# Patient Record
Sex: MALE | Race: WHITE | Employment: OTHER | URBAN - METROPOLITAN AREA
[De-identification: names, ages, dates, MRNs, and addresses within clinical notes are randomized per-mention and may not be internally consistent; named-entity substitution may affect disease eponyms.]

---

## 2017-10-26 ENCOUNTER — APPOINTMENT (OUTPATIENT)
Dept: CT IMAGING | Age: 72
DRG: 682 | End: 2017-10-26
Attending: FAMILY MEDICINE
Payer: MEDICARE

## 2017-10-26 ENCOUNTER — HOSPITAL ENCOUNTER (INPATIENT)
Age: 72
LOS: 4 days | Discharge: NURSING, REHAB OR LONG-TERM CARE | DRG: 682 | End: 2017-10-31
Attending: EMERGENCY MEDICINE | Admitting: HOSPITALIST
Payer: MEDICARE

## 2017-10-26 ENCOUNTER — APPOINTMENT (OUTPATIENT)
Dept: GENERAL RADIOLOGY | Age: 72
DRG: 682 | End: 2017-10-26
Attending: FAMILY MEDICINE
Payer: MEDICARE

## 2017-10-26 DIAGNOSIS — R55 SYNCOPE, UNSPECIFIED SYNCOPE TYPE: Primary | ICD-10-CM

## 2017-10-26 DIAGNOSIS — A41.9 SEPSIS, DUE TO UNSPECIFIED ORGANISM: ICD-10-CM

## 2017-10-26 DIAGNOSIS — N17.9 ACUTE RENAL INJURY (HCC): ICD-10-CM

## 2017-10-26 LAB
ALBUMIN SERPL-MCNC: 3 G/DL (ref 3.4–5)
ALBUMIN/GLOB SERPL: 0.8 {RATIO} (ref 0.8–1.7)
ALP SERPL-CCNC: 52 U/L (ref 45–117)
ALT SERPL-CCNC: 22 U/L (ref 16–61)
ANION GAP SERPL CALC-SCNC: 15 MMOL/L (ref 3–18)
APTT PPP: 23.4 SEC (ref 23–36.4)
AST SERPL-CCNC: 18 U/L (ref 15–37)
ATRIAL RATE: 126 BPM
BASOPHILS # BLD: 0 K/UL (ref 0–0.06)
BASOPHILS NFR BLD: 0 % (ref 0–2)
BILIRUB SERPL-MCNC: 0.4 MG/DL (ref 0.2–1)
BNP SERPL-MCNC: 173 PG/ML (ref 0–900)
BUN SERPL-MCNC: 34 MG/DL (ref 7–18)
BUN/CREAT SERPL: 15 (ref 12–20)
CALCIUM SERPL-MCNC: 8.7 MG/DL (ref 8.5–10.1)
CALCULATED P AXIS, ECG09: 77 DEGREES
CALCULATED R AXIS, ECG10: 73 DEGREES
CALCULATED T AXIS, ECG11: -92 DEGREES
CHLORIDE SERPL-SCNC: 94 MMOL/L (ref 100–108)
CK MB CFR SERPL CALC: 1.4 % (ref 0–4)
CK MB SERPL-MCNC: 1.1 NG/ML (ref 5–25)
CK SERPL-CCNC: 78 U/L (ref 39–308)
CO2 SERPL-SCNC: 23 MMOL/L (ref 21–32)
CREAT SERPL-MCNC: 2.21 MG/DL (ref 0.6–1.3)
DIAGNOSIS, 93000: NORMAL
DIFFERENTIAL METHOD BLD: ABNORMAL
EOSINOPHIL # BLD: 0 K/UL (ref 0–0.4)
EOSINOPHIL NFR BLD: 0 % (ref 0–5)
ERYTHROCYTE [DISTWIDTH] IN BLOOD BY AUTOMATED COUNT: 13.1 % (ref 11.6–14.5)
GLOBULIN SER CALC-MCNC: 3.9 G/DL (ref 2–4)
GLUCOSE SERPL-MCNC: 194 MG/DL (ref 74–99)
HCT VFR BLD AUTO: 47.8 % (ref 36–48)
HGB BLD-MCNC: 16.8 G/DL (ref 13–16)
INR PPP: 1.1 (ref 0.8–1.2)
LYMPHOCYTES # BLD: 1 K/UL (ref 0.9–3.6)
LYMPHOCYTES NFR BLD: 7 % (ref 21–52)
MAGNESIUM SERPL-MCNC: 1.7 MG/DL (ref 1.6–2.6)
MCH RBC QN AUTO: 30.8 PG (ref 24–34)
MCHC RBC AUTO-ENTMCNC: 35.1 G/DL (ref 31–37)
MCV RBC AUTO: 87.7 FL (ref 74–97)
MONOCYTES # BLD: 1.3 K/UL (ref 0.05–1.2)
MONOCYTES NFR BLD: 10 % (ref 3–10)
NEUTS SEG # BLD: 11.2 K/UL (ref 1.8–8)
NEUTS SEG NFR BLD: 83 % (ref 40–73)
P-R INTERVAL, ECG05: 128 MS
PLATELET # BLD AUTO: 302 K/UL (ref 135–420)
PMV BLD AUTO: 10.8 FL (ref 9.2–11.8)
POTASSIUM SERPL-SCNC: 4.2 MMOL/L (ref 3.5–5.5)
PROT SERPL-MCNC: 6.9 G/DL (ref 6.4–8.2)
PROTHROMBIN TIME: 13.5 SEC (ref 11.5–15.2)
Q-T INTERVAL, ECG07: 300 MS
QRS DURATION, ECG06: 72 MS
QTC CALCULATION (BEZET), ECG08: 434 MS
RBC # BLD AUTO: 5.45 M/UL (ref 4.7–5.5)
SODIUM SERPL-SCNC: 132 MMOL/L (ref 136–145)
TROPONIN I SERPL-MCNC: <0.02 NG/ML (ref 0–0.06)
VENTRICULAR RATE, ECG03: 126 BPM
WBC # BLD AUTO: 13.5 K/UL (ref 4.6–13.2)

## 2017-10-26 PROCEDURE — 71010 XR CHEST PORT: CPT

## 2017-10-26 PROCEDURE — 99285 EMERGENCY DEPT VISIT HI MDM: CPT

## 2017-10-26 PROCEDURE — 85025 COMPLETE CBC W/AUTO DIFF WBC: CPT | Performed by: FAMILY MEDICINE

## 2017-10-26 PROCEDURE — 93005 ELECTROCARDIOGRAM TRACING: CPT

## 2017-10-26 PROCEDURE — 80053 COMPREHEN METABOLIC PANEL: CPT | Performed by: FAMILY MEDICINE

## 2017-10-26 PROCEDURE — 82550 ASSAY OF CK (CPK): CPT | Performed by: FAMILY MEDICINE

## 2017-10-26 PROCEDURE — 83880 ASSAY OF NATRIURETIC PEPTIDE: CPT | Performed by: FAMILY MEDICINE

## 2017-10-26 PROCEDURE — 85610 PROTHROMBIN TIME: CPT | Performed by: FAMILY MEDICINE

## 2017-10-26 PROCEDURE — 85730 THROMBOPLASTIN TIME PARTIAL: CPT | Performed by: FAMILY MEDICINE

## 2017-10-26 PROCEDURE — 83735 ASSAY OF MAGNESIUM: CPT | Performed by: FAMILY MEDICINE

## 2017-10-26 PROCEDURE — 85379 FIBRIN DEGRADATION QUANT: CPT | Performed by: EMERGENCY MEDICINE

## 2017-10-26 PROCEDURE — 70450 CT HEAD/BRAIN W/O DYE: CPT

## 2017-10-26 RX ORDER — ONDANSETRON 2 MG/ML
4 INJECTION INTRAMUSCULAR; INTRAVENOUS
Status: COMPLETED | OUTPATIENT
Start: 2017-10-26 | End: 2017-10-27

## 2017-10-26 NOTE — Clinical Note
Status[de-identified] Inpatient [101] Type of Bed: Telemetry [19] Inpatient Hospitalization Certified Necessary for the Following Reasons: 3. Patient receiving treatment that can only be provided in an inpatient setting (further clarification in H&P documentation) Admitting Diagnosis: Syncope [[id number]] Admitting Diagnosis: Sepsis ([facility] [[id number]] Admitting Physician: Jacqueline Zheng [[id number]] Attending Physician: Jacqueline Zheng [[id number]] Estimated Length of Stay: 3-4 Midnights Discharge Plan[de-identified] Home with Office Follow-up

## 2017-10-27 ENCOUNTER — APPOINTMENT (OUTPATIENT)
Dept: NUCLEAR MEDICINE | Age: 72
DRG: 682 | End: 2017-10-27
Attending: EMERGENCY MEDICINE
Payer: MEDICARE

## 2017-10-27 ENCOUNTER — APPOINTMENT (OUTPATIENT)
Dept: ULTRASOUND IMAGING | Age: 72
DRG: 682 | End: 2017-10-27
Attending: HOSPITALIST
Payer: MEDICARE

## 2017-10-27 PROBLEM — R55 SYNCOPE: Status: ACTIVE | Noted: 2017-10-27

## 2017-10-27 PROBLEM — N17.9 ACUTE RENAL INJURY (HCC): Status: ACTIVE | Noted: 2017-10-27

## 2017-10-27 PROBLEM — E86.0 DEHYDRATION: Status: ACTIVE | Noted: 2017-10-27

## 2017-10-27 PROBLEM — A41.9 SEPSIS (HCC): Status: ACTIVE | Noted: 2017-10-27

## 2017-10-27 LAB
APPEARANCE UR: CLEAR
BACTERIA URNS QL MICRO: NEGATIVE /HPF
BILIRUB UR QL: NEGATIVE
COLOR UR: ABNORMAL
D DIMER PPP FEU-MCNC: 1.31 UG/ML(FEU)
EPITH CASTS URNS QL MICRO: NEGATIVE /LPF (ref 0–5)
GLUCOSE UR STRIP.AUTO-MCNC: NEGATIVE MG/DL
HGB UR QL STRIP: NEGATIVE
KETONES UR QL STRIP.AUTO: ABNORMAL MG/DL
LACTATE SERPL-SCNC: 2.1 MMOL/L (ref 0.4–2)
LACTATE SERPL-SCNC: 2.3 MMOL/L (ref 0.4–2)
LACTATE SERPL-SCNC: 2.6 MMOL/L (ref 0.4–2)
LACTATE SERPL-SCNC: 3.9 MMOL/L (ref 0.4–2)
LEUKOCYTE ESTERASE UR QL STRIP.AUTO: NEGATIVE
MUCOUS THREADS URNS QL MICRO: NEGATIVE /LPF
NITRITE UR QL STRIP.AUTO: NEGATIVE
PH UR STRIP: 5 [PH] (ref 5–8)
PROT UR STRIP-MCNC: ABNORMAL MG/DL
RBC #/AREA URNS HPF: NEGATIVE /HPF (ref 0–5)
SP GR UR REFRACTOMETRY: 1.02 (ref 1–1.03)
UROBILINOGEN UR QL STRIP.AUTO: 0.2 EU/DL (ref 0.2–1)
WBC URNS QL MICRO: NEGATIVE /HPF (ref 0–5)

## 2017-10-27 PROCEDURE — A9540 TC99M MAA: HCPCS

## 2017-10-27 PROCEDURE — 36415 COLL VENOUS BLD VENIPUNCTURE: CPT | Performed by: HOSPITALIST

## 2017-10-27 PROCEDURE — 96361 HYDRATE IV INFUSION ADD-ON: CPT

## 2017-10-27 PROCEDURE — 87040 BLOOD CULTURE FOR BACTERIA: CPT | Performed by: EMERGENCY MEDICINE

## 2017-10-27 PROCEDURE — 65660000000 HC RM CCU STEPDOWN

## 2017-10-27 PROCEDURE — 74011000258 HC RX REV CODE- 258: Performed by: EMERGENCY MEDICINE

## 2017-10-27 PROCEDURE — 96365 THER/PROPH/DIAG IV INF INIT: CPT

## 2017-10-27 PROCEDURE — 74011250637 HC RX REV CODE- 250/637: Performed by: HOSPITALIST

## 2017-10-27 PROCEDURE — 74011250636 HC RX REV CODE- 250/636: Performed by: EMERGENCY MEDICINE

## 2017-10-27 PROCEDURE — 83605 ASSAY OF LACTIC ACID: CPT | Performed by: EMERGENCY MEDICINE

## 2017-10-27 PROCEDURE — 74011000250 HC RX REV CODE- 250: Performed by: HOSPITALIST

## 2017-10-27 PROCEDURE — 97162 PT EVAL MOD COMPLEX 30 MIN: CPT

## 2017-10-27 PROCEDURE — 76770 US EXAM ABDO BACK WALL COMP: CPT

## 2017-10-27 PROCEDURE — 94640 AIRWAY INHALATION TREATMENT: CPT

## 2017-10-27 PROCEDURE — 83605 ASSAY OF LACTIC ACID: CPT | Performed by: HOSPITALIST

## 2017-10-27 PROCEDURE — 97165 OT EVAL LOW COMPLEX 30 MIN: CPT

## 2017-10-27 PROCEDURE — 74011250636 HC RX REV CODE- 250/636: Performed by: HOSPITALIST

## 2017-10-27 PROCEDURE — 97530 THERAPEUTIC ACTIVITIES: CPT

## 2017-10-27 PROCEDURE — 97116 GAIT TRAINING THERAPY: CPT

## 2017-10-27 PROCEDURE — 94760 N-INVAS EAR/PLS OXIMETRY 1: CPT

## 2017-10-27 PROCEDURE — 96375 TX/PRO/DX INJ NEW DRUG ADDON: CPT

## 2017-10-27 PROCEDURE — 77030019952 HC CANSTR VAC ASST KCON -B

## 2017-10-27 PROCEDURE — 81001 URINALYSIS AUTO W/SCOPE: CPT | Performed by: FAMILY MEDICINE

## 2017-10-27 RX ORDER — GUAIFENESIN 100 MG/5ML
81 LIQUID (ML) ORAL DAILY
Status: DISCONTINUED | OUTPATIENT
Start: 2017-10-28 | End: 2017-10-31 | Stop reason: HOSPADM

## 2017-10-27 RX ORDER — LORAZEPAM 2 MG/ML
0.5 INJECTION INTRAMUSCULAR
Status: COMPLETED | OUTPATIENT
Start: 2017-10-27 | End: 2017-10-27

## 2017-10-27 RX ORDER — PRAVASTATIN SODIUM 20 MG/1
40 TABLET ORAL
Status: DISCONTINUED | OUTPATIENT
Start: 2017-10-27 | End: 2017-10-31 | Stop reason: HOSPADM

## 2017-10-27 RX ORDER — SODIUM CHLORIDE 9 MG/ML
50 INJECTION, SOLUTION INTRAVENOUS CONTINUOUS
Status: DISCONTINUED | OUTPATIENT
Start: 2017-10-27 | End: 2017-10-31 | Stop reason: HOSPADM

## 2017-10-27 RX ORDER — IPRATROPIUM BROMIDE AND ALBUTEROL SULFATE 2.5; .5 MG/3ML; MG/3ML
3 SOLUTION RESPIRATORY (INHALATION)
Status: DISCONTINUED | OUTPATIENT
Start: 2017-10-27 | End: 2017-10-28

## 2017-10-27 RX ORDER — SERTRALINE HYDROCHLORIDE 50 MG/1
100 TABLET, FILM COATED ORAL DAILY
Status: DISCONTINUED | OUTPATIENT
Start: 2017-10-28 | End: 2017-10-31 | Stop reason: HOSPADM

## 2017-10-27 RX ORDER — OMEPRAZOLE 20 MG/1
20 CAPSULE, DELAYED RELEASE ORAL DAILY
Status: DISCONTINUED | OUTPATIENT
Start: 2017-10-28 | End: 2017-10-31 | Stop reason: HOSPADM

## 2017-10-27 RX ORDER — ENOXAPARIN SODIUM 100 MG/ML
1 INJECTION SUBCUTANEOUS
Status: COMPLETED | OUTPATIENT
Start: 2017-10-27 | End: 2017-10-27

## 2017-10-27 RX ORDER — HEPARIN SODIUM 5000 [USP'U]/ML
5000 INJECTION, SOLUTION INTRAVENOUS; SUBCUTANEOUS EVERY 8 HOURS
Status: DISCONTINUED | OUTPATIENT
Start: 2017-10-28 | End: 2017-10-31 | Stop reason: HOSPADM

## 2017-10-27 RX ADMIN — SODIUM CHLORIDE 1000 ML: 900 INJECTION, SOLUTION INTRAVENOUS at 00:40

## 2017-10-27 RX ADMIN — LORAZEPAM 0.5 MG: 2 INJECTION INTRAMUSCULAR; INTRAVENOUS at 00:40

## 2017-10-27 RX ADMIN — SODIUM CHLORIDE 125 ML/HR: 900 INJECTION, SOLUTION INTRAVENOUS at 18:35

## 2017-10-27 RX ADMIN — PRAVASTATIN SODIUM 40 MG: 20 TABLET ORAL at 21:17

## 2017-10-27 RX ADMIN — CEFTRIAXONE 1 G: 1 INJECTION, POWDER, FOR SOLUTION INTRAMUSCULAR; INTRAVENOUS at 00:40

## 2017-10-27 RX ADMIN — IPRATROPIUM BROMIDE AND ALBUTEROL SULFATE 3 ML: .5; 3 SOLUTION RESPIRATORY (INHALATION) at 20:18

## 2017-10-27 RX ADMIN — ONDANSETRON 4 MG: 2 INJECTION INTRAMUSCULAR; INTRAVENOUS at 00:40

## 2017-10-27 RX ADMIN — IPRATROPIUM BROMIDE AND ALBUTEROL SULFATE 3 ML: .5; 3 SOLUTION RESPIRATORY (INHALATION) at 23:43

## 2017-10-27 RX ADMIN — IPRATROPIUM BROMIDE AND ALBUTEROL SULFATE 3 ML: .5; 3 SOLUTION RESPIRATORY (INHALATION) at 15:14

## 2017-10-27 RX ADMIN — ENOXAPARIN SODIUM 80 MG: 80 INJECTION SUBCUTANEOUS at 03:02

## 2017-10-27 RX ADMIN — SODIUM CHLORIDE 1000 ML: 900 INJECTION, SOLUTION INTRAVENOUS at 01:00

## 2017-10-27 NOTE — H&P
History & Physical    Patient: Leona Thao MRN: [id number]  CSN: [id number]    YOB: 1945  Age: 81 y.o. Sex: male      DOA: 10/26/2017  Primary Care Provider:  Evert Aragon MD      Assessment/Plan     Patient Active Problem List   Diagnosis Code    Syncope R55    Acute renal injury ([facility] N17.9    Hypertension I10    Chronic obstructive pulmonary disease ([facility] J44.9    Dehydration E86.0       Admit to telemetry    Syncopal episode - likely secondary to orthostatic hypotension from dehydration. Monitor in telemetry. YA - pre renal, intravascular volume depletion. As evidence by hemoconcentration. Start no IVF,   Follow renal function  Will evaluate ARF by getting following labs   -Urine indices including urine analysis, urine sodium,urine creatinine,urine osmolality, renal usg. Unlikely sepsis - his lactic acid elevation is secondary to hypo perfusion. No evidence or source of infection. COPD - no active wheezing, duo nebs as needed. HTN - hold lisinopril and hctz. Elevated D-dimer - VQ scan ordered by ER, he was given therapeutic dose of lovenox in ER. His oxygen saturations % on room air. DVT prophylaxis            Estimated length of stay : 1-2    CC: syncope       HPI:     Leona Thao is a 81 y.o. male who has past history of COPD, HTN, stroke presents to ER with concerns of syncopal episode. Patient is poor historian. He reports that last night he got out of shower and he passed out. He was not able get up. EMS was called and patient brought to ER. Patient reports that he has been feeling weak, fatigue and not being well for about a month. Over the past few days he has been feeling shaking/chills. He denies any chest pain, SOB, N/V. He lives alone. Denies smoking or alcohol use. In ER his temp at 96.1, his BUN/Cr 34/2.21, cardiac enzymes negative.  D-Dimer at 1.31, lactic acid at 3. UA with no evidence of UTI, CXR with no acute findings. Past Medical History:   Diagnosis Date    Chronic obstructive pulmonary disease ([facility]     Hypertension     Stroke [facility])        Past Surgical History:   Procedure Laterality Date    HX ORTHOPAEDIC      left leg surgery       History reviewed. No pertinent family history. Social History     Social History    Marital status:      Spouse name: N/A    Number of children: N/A    Years of education: N/A     Social History Main Topics    Smoking status: Former Smoker    Smokeless tobacco: Never Used    Alcohol use None    Drug use: None    Sexual activity: Not Asked     Other Topics Concern    None     Social History Narrative       Prior to Admission medications    Medication Sig Start Date End Date Taking? Authorizing Provider   aspirin 81 mg chewable tablet Take 81 mg by mouth daily. Yes Malinda Corbin MD   lisinopril-hydrochlorothiazide (PRINZIDE, ZESTORETIC) 10-12.5 mg per tablet Take  by mouth daily. Yes Malinda Corbin MD   pravastatin (PRAVACHOL) 40 mg tablet Take 40 mg by mouth nightly. Yes Malinda Corbin MD   albuterol (PROVENTIL HFA, VENTOLIN HFA, PROAIR HFA) 90 mcg/actuation inhaler Take  by inhalation. Yes Malinda Corbin MD   omeprazole (PRILOSEC) 20 mg capsule Take 1 capsule by mouth daily. 10/5/14  Yes Caleen Meigs, MD   ondansetron (ZOFRAN ODT) 8 mg disintegrating tablet Take 1 tablet by mouth every eight (8) hours as needed for Nausea. 10/5/14  Yes Caleen Meigs, MD   sertraline (ZOLOFT) 100 mg tablet Take 100 mg by mouth daily. Malinda Corbin MD   tiotropium (SPIRIVA WITH HANDIHALER) 18 mcg inhalation capsule Take 1 capsule by inhalation daily. Malinda Corbin MD   hydrocodone-acetaminophen (NORCO) 7.5-325 mg per tablet Take  by mouth. Malinda Corbin MD       No Known Allergies    Review of Systems  Gen: see above   Heent: No headache, rhinorrhea, epistaxis, ear pain, hearing loss, sinus pain, neck pain/stiffness, sore throat.    Heart: No chest pain, palpitations, MCMILLAN, pnd, or orthopnea. Resp: No cough, hemoptysis, wheezing and shortness of breath. GI: No nausea, vomiting, diarrhea, constipation, melena or hematochezia. : No urinary obstruction, dysuria or hematuria. Derm: No rash, new skin lesion or pruritis. Musc/skeletal: no bone or joint complains. Vasc: No edema, cyanosis or claudication. Endo: No, no polyuria,polydipsia or polyphagia. Neuro: No unilateral weakness, numbness, tingling. No seizures. Heme: No easy bruising or bleeding. Physical Exam:     Physical Exam:  Visit Vitals    /66 (BP 1 Location: Left arm, BP Patient Position: At rest)    Pulse 98    Temp 98 °F (36.7 °C)    Resp 14    Ht 5' 7\" (1.702 m)    Wt 84.3 kg (185 lb 12.8 oz)    SpO2 99%    BMI 29.1 kg/m2      O2 Device: Room air    Temp (24hrs), Av.4 °F (36.3 °C), Min:96.1 °F (35.6 °C), Max:98.2 °F (36.8 °C)             General:  Awake, cooperative, no distress. Head:  Normocephalic, without obvious abnormality, atraumatic. Eyes:  Conjunctivae/corneas clear, sclera anicteric, PERRL, EOMs intact. Nose: Nares normal. No drainage or sinus tenderness. Throat: Lips, mucosa, and tongue normal.    Neck: Supple, symmetrical, trachea midline, no adenopathy. Lungs:   Clear to auscultation bilaterally. Heart:  Regular rate and rhythm, S1, S2 normal, no murmur, click, rub or gallop. Abdomen: Soft, non-tender. Bowel sounds normal. No masses,  No organomegaly. Extremities: Extremities normal, atraumatic, no cyanosis or edema. Capillary refill normal.   Pulses: 2+ and symmetric all extremities. Skin: Skin color pink, turgor normal. No rashes or lesions   Neurologic: CNII-XII intact. No focal motor or sensory deficit.        Labs Reviewed:    CMP:   Lab Results   Component Value Date/Time     (L) 10/26/2017 11:07 PM    K 4.2 10/26/2017 11:07 PM    CL 94 (L) 10/26/2017 11:07 PM    CO2 23 10/26/2017 11:07 PM    AGAP 15 10/26/2017 11:07 PM    GLU 194 (H) 10/26/2017 11:07 PM    BUN 34 (H) 10/26/2017 11:07 PM    CREA 2.21 (H) 10/26/2017 11:07 PM    GFRAA 36 (L) 10/26/2017 11:07 PM    GFRNA 30 (L) 10/26/2017 11:07 PM    CA 8.7 10/26/2017 11:07 PM    MG 1.7 10/26/2017 11:07 PM    ALB 3.0 (L) 10/26/2017 11:07 PM    TP 6.9 10/26/2017 11:07 PM    GLOB 3.9 10/26/2017 11:07 PM    AGRAT 0.8 10/26/2017 11:07 PM    SGOT 18 10/26/2017 11:07 PM    ALT 22 10/26/2017 11:07 PM     CBC:   Lab Results   Component Value Date/Time    WBC 13.5 (H) 10/26/2017 11:07 PM    HGB 16.8 (H) 10/26/2017 11:07 PM    HCT 47.8 10/26/2017 11:07 PM     10/26/2017 11:07 PM     All Cardiac Markers in the last 24 hours:   Lab Results   Component Value Date/Time    CPK 78 10/26/2017 11:07 PM    CKMB 1.1 10/26/2017 11:07 PM    CKND1 1.4 10/26/2017 11:07 PM    TROIQ <0.02 10/26/2017 11:07 PM         Procedures/imaging: see electronic medical records for all procedures/Xrays and details which were not copied into this note but were reviewed prior to creation of Plan        CC: Evert Aragon MD

## 2017-10-27 NOTE — PROGRESS NOTES
Readmission Risk Assessment: Low Risk and MSSP/[facility] patients    RRAT Score: 1 - 12    Initial Assessment: presents to the emergency department via EMS C/O syncopal episode lasting 15 minutes onset just PTA patient admitted to telemetry unit for syncope and sepsis    Emergency Contact: see face sheet    Pertinent Medical Hx: COPD, HTN, stroke    PCP/Specialists: Cat Palacios    [facility] Services:     DME:     Low Risk Care Transition Plan:  1. Evaluate for [facility] facility or [address] coordination of resources  2. Involve patient/caregiver in assessment, planning, education and implement of intervention. 3. CM daily patient care huddles/interdisciplinary rounds. 4. PCP/Specialist appointment within 7 - 10 days made prior to discharge. 5. Facilitate transportation and logistics for follow-up appointments. 6. Handoff to [address] Nurse Navigator or PCP practice  Chart Reviewed. Noted patient admitted to the hospital for further medical management. Care Management to follow up with patient and/or family for transition of care needs prn. Met with patient at bedside. He informed cm that he would need rehab when discharged. [facility] provided and he would like to submit to [address] [place] [address] [facility], and [facility]. CMS will submit referrals.  Patient will need 3 midnights to submit for rehab

## 2017-10-27 NOTE — PROGRESS NOTES
attempted visit with patient but he was out of room and  unavailable.  provided brochure and [address] as well and [employer] booklet for patient. Chaplains remain available to provide pastoral support to patient and family as needed and requested. Rev.  Nuvia Wynn  [phone]

## 2017-10-27 NOTE — PROGRESS NOTES
Problem: Self Care Deficits Care Plan (Adult)  Goal: *Acute Goals and Plan of Care (Insert Text)  Occupational Therapy Goals  Initiated 10/27/2017 within 7 day(s). 1.  Patient will perform lower body dressing with  modified independence while sitting on EOB  2. Patient will perform grooming with modified independence while standing at sink. 3.  Patient will perform toilet transfers with  modified independence. 4.  Patient will perform all aspects of toileting with  modified independence. 5.  Patient will participate in upper extremity therapeutic exercise/activities with  modified independence for 15 minutes. 6.  Patient will utilize energy conservation techniques during functional activities with verbal cues. Occupational Therapy EVALUATION    Patient: Kamla Avila (75 y.o. male)  Date: 10/27/2017  Primary Diagnosis: Syncope  Sepsis ([facility]  Syncope  Sepsis ([facility]  Acute renal injury [facility])  Precautions:  Fall    ASSESSMENT :  Based on the objective data described below, the patient presents with decreased ability to perform functional mobility, bed mobility, LE dressing and functional transfers. Pt limited by pain, decreased strength/endurance which is affecting  activity tolerance to complete ADLs/IADLs and functional mobility/transfers. Pt was seen with PT to maximize safety and performance in evaluation. Pt did complete bed mobility with SBA. Pt did report of light-headedness upon sitting on EOB. Pt performed sit/stand transfer with CGA and RW. Pt performed in-room and hallway moblity with RW but reported of 10/10 fatigue at end after sitting on EOB. Pt's HR was 141 upon sitting EOB and went down to 129 and remained. Once transferred back to supine, pt's HR was 79. Pt was left with all needs met. Pt would benefit from skilled OT services to work toward PLOF in being as independent as possible. Pt does live alone with no support system.  Pt is highly motivated and agreeable to participating in OT.    Patient will benefit from skilled intervention to address the above impairments. Patients rehabilitation potential is considered to be Excellent  Factors which may influence rehabilitation potential include:   [x]             None noted  []             Mental ability/status  []             Medical condition  []             Home/family situation and support systems  []             Safety awareness  []             Pain tolerance/management  []             Other:      PLAN :  Recommendations and Planned Interventions:  [x]               Self Care Training                  [x]        Therapeutic Activities  [x]               Functional Mobility Training    []        Cognitive Retraining  []               Therapeutic Exercises           []        Endurance Activities  []               Balance Training                   []        Neuromuscular Re-Education  []               Visual/Perceptual Training     [x]   Home Safety Training  [x]               Patient Education                 [x]        Family Training/Education  []               Other (comment):    Frequency/Duration: Patient will be followed by occupational therapy 3-5x/wk for 1week   to address goals. Discharge Recommendations: Skilled Nursing Facility  Further Equipment Recommendations for Discharge: TBD     SUBJECTIVE:   Patient stated I want to eat. . But I'm NPO.    OBJECTIVE DATA SUMMARY:     Past Medical History:   Diagnosis Date    Chronic obstructive pulmonary disease ([facility]     Hypertension     [facility]      Past Surgical History:   Procedure Laterality Date    HX ORTHOPAEDIC      left leg surgery     Barriers to Learning/Limitations: None  Compensate with: visual, verbal, tactile, kinesthetic cues/model  Prior Level of Function/Home Situation: Ind with ADLs/IADLs.  Driving prior  Home Situation  Home Environment: Private residence  # Steps to Enter: 0  Wheelchair Ramp: Yes  One/Two Story Residence: One story  Living Alone: Yes  Support Systems: Friends \ neighbors  Patient Expects to be Discharged to[de-identified] Private residence  Current DME Used/Available at Home: Hospital bed, Walker, rolling, Adaptive bathing aides, Shower chair, Verlyn [place], straight, Wheelchair (amb with [facility]; reports no falls)  Tub or Shower Type: Shower  Cognitive/Behavioral Status:  Neurologic State: Alert; Appropriate for age  Orientation Level: Appropriate for age;Oriented X4  Cognition: Appropriate decision making; Appropriate for age attention/concentration; Appropriate safety awareness; Follows commands  Safety/Judgement: Awareness of environment  Skin: BUE skin intact  Edema: none noted  Vision/Perceptual:    Corrective Lenses: Reading glasses  Coordination:  Coordination: Generally decreased, functional  Fine Motor Skills-Upper: Left Intact; Right Intact    Gross Motor Skills-Upper: Left Intact; Right Intact  Balance:  Sitting: Intact  Standing: Intact; With support  Strength:(B) UE   Strength: Generally decreased, functional   Tone & Sensation:(B) UE   Sensation: Intact  Range of Motion:(B) UE  AROM: Within functional limits   Functional Mobility and Transfers for ADLs:  Bed Mobility:  Rolling: Setup  Supine to Sit: Stand-by asssistance  Sit to Supine: Stand-by asssistance   Transfers:  Sit to Stand: Contact guard assistance   ADL Assessment/Intervention:   Upper Body Dressing: Minimum assistance (donning of gown like a jacket)  Lower Body Dressing: Setup (pt donned neda socks while seated on EOB)    Cognitive Retraining  Safety/Judgement: Awareness of environment   Pain:  Pain Scale 1: Numeric (0 - 10)  Pain Intensity 1: 0  Pain Location 1: Back  Activity Tolerance:   Pt tolerated tx/eval well with no signs of fatigue or increase in pain after treatment. Please refer to the flowsheet for vital signs taken during this treatment.   After treatment:   [] Patient left in no apparent distress sitting up in chair  [] Patient left sitting on EOB  [x] Patient left in no apparent distress in bed  [] Patient declined to be OOB at this time due to   [x] Call bell left within reach  [x] Nursing notified()  [] Caregiver present  [] Bed alarm activated  [] CPM placed on  knee  COMMUNICATION/EDUCATION:   [] Home safety education was provided and the patient/caregiver indicated understanding. [x] Patient/family have participated as able in goal setting and plan of care. [x] Patient/family agree to work toward stated goals and plan of care. [] Patient understands intent and goals of therapy, but is neutral about his/her participation. [] Patient is unable to participate in goal setting and plan of care.     Thank you for this referral.  Kaelyn Decker, OT  Time Calculation: 26 mins

## 2017-10-27 NOTE — PROGRESS NOTES
Problem: Mobility Impaired (Adult and Pediatric)  Goal: *Acute Goals and Plan of Care (Insert Text)  Physical Therapy Goals LT/ST  Initiated 10/27/2017 and to be accomplished within 5-7 day(s)  1. Patient will move from supine <> sit with S/mod I in prep for out of bed activity and change of position. 2.  Patient will perform sit<> stand with S/mod I with LRAD in prep for transfers/ambulation. 3.  Patient will transfer from bed <> chair with S with LRAD for time up in chair for completion of ADL activity. 4.  Patient will ambulate 150 feet with S/mod I with LRAD for improved functional mobility/safe discharge. 5.  Patient will ascend/descend 3-5 stairs with handrail(s) with minimal assistance/contact guard assist for home re-entry as needed. Outcome: Progressing Towards Goal  physical Therapy EVALUATION    Patient: Dianne Bar (85 y.o. male)  Date: 10/27/2017  Primary Diagnosis: Syncope  Sepsis ([facility]  Syncope  Sepsis ([facility]  Acute renal injury ([facility]  Precautions:Fall    ASSESSMENT :  Based on the objective data described below, the patient presents with decreased independence in functional mobility with regard to bed mobility, transfers, gait, balance and activity tolerance. Patient reports pain 3/10 back (likely due to bed positioning). Pt able to participate in GT/RW/75ft with CGA using slow davon, vc for posture correction. HR increased to 140's upon return to room, decreased to 120's with resting 2 min and to 70's upon return to supine. Pt lives alone, fell in shower which led to current admission. Pt reports not feeling well for 1 month since receiving flu booster shot. May benefit from continued PT in rehab/SNF prior to return home to independent living in order to reach maximal level of independence/safety with functional mobility.      Pt Education: pt educated in safety/technique during functional mobility tasks     Patient will benefit from skilled intervention to address the above impairments. Patients rehabilitation potential is considered to be Good  Factors which may influence rehabilitation potential include:   []         None noted  []         Mental ability/status  [x]         Medical condition  []         Home/family situation and support systems  []         Safety awareness  []         Pain tolerance/management  []         Other:      PLAN :  Recommendations and Planned Interventions:  [x]           Bed Mobility Training             []    Neuromuscular Re-Education  [x]           Transfer Training                   []    Orthotic/Prosthetic Training  [x]           Gait Training                          []    Modalities  [x]           Therapeutic Exercises          []    Edema Management/Control  [x]           Therapeutic Activities            [x]    Patient and Family Training/Education  []           Other (comment):    Frequency/Duration: Patient will be followed by physical therapy 1-2 times per day to address goals. Discharge Recommendations: [facility]  Further Equipment Recommendations for Discharge: rolling walker     SUBJECTIVE:   Patient stated My legs feel weak.     OBJECTIVE DATA SUMMARY:     Past Medical History:   Diagnosis Date    [facility] pulmonary disease ([facility]     Hypertension     Stroke [facility])      Past Surgical History:   Procedure Laterality Date    HX ORTHOPAEDIC      left leg surgery     Barriers to Learning/Limitations: None  Compensate with: N/A  Prior Level of Function/Home Situation: amb with SPC PTA independently  Home Situation  Home Environment: Private residence  # Steps to Enter: 0  Wheelchair Ramp: Yes  One/Two Story Residence: One story  Living Alone: Yes  Support Systems: Friends \ neighbors  Patient Expects to be Discharged to[de-identified] Private residence  Current DME Used/Available at Home: Hospital bed, Walker, rolling, Adaptive bathing aides, Shower chair,  [place], straight, Wheelchair (amb with [facility]; reports no falls)  Tub or Shower Type: Shower  Critical Behavior:  Neurologic State: Alert; Appropriate for age  Orientation Level: Appropriate for age;Oriented X4  Cognition: Appropriate decision making; Appropriate for age attention/concentration; Appropriate safety awareness; Follows commands  Safety/Judgement: Awareness of environment  Psychosocial  Patient Behaviors: Calm; Cooperative; Anxious (anxious toward end of session)  Strength:    Strength: Generally decreased, functional  Tone & Sensation:   Sensation: Intact  Range Of Motion:  AROM: Within functional limits  Functional Mobility:  Bed Mobility:  Rolling: Setup  Supine to Sit: Stand-by asssistance  Sit to Supine: Stand-by asssistance  Transfers:  Sit to Stand: Contact guard assistance  Stand to Sit: Contact guard assistance  Balance:   Sitting: Intact  Standing: Intact; With support  Ambulation/Gait Training:  Distance (ft): 75 Feet (ft)  Assistive Device: Gait belt;Walker, rolling  Ambulation - Level of Assistance: Contact guard assistance  Gait Description (WDL): Exceptions to WDL  Gait Abnormalities: Decreased step clearance; Other (forward trunk slightly )  Speed/Irri: Pace decreased (<100 feet/min)  Step Length: Left shortened;Right shortened  Interventions: Safety awareness training;Verbal cues  Pain:  Pain Scale 1: Numeric (0 - 10)  Pain Intensity 1: 3  Pain Location 1: Back  Activity Tolerance:   fair  Please refer to the flowsheet for vital signs taken during this treatment. After treatment:   []         Patient left in no apparent distress sitting up in chair  [x]         Patient left in no apparent distress in bed  [x]         Call bell left within reach  [x]         Nursing notified  []         Caregiver present  []         Bed alarm activated    COMMUNICATION/EDUCATION:   [x]         Fall prevention education was provided and the patient/caregiver indicated understanding. [x]         Patient/family have participated as able in goal setting and plan of care.   [x] Patient/family agree to work toward stated goals and plan of care. []         Patient understands intent and goals of therapy, but is neutral about his/her participation. []         Patient is unable to participate in goal setting and plan of care. Eval Complexity: History: HIGH Complexity :3+ comorbidities / personal factors will impact the outcome/ POC Exam:LOW Complexity : 1-2 Standardized tests and measures addressing body structure, function, activity limitation and / or participation in recreation  Presentation: LOW Complexity : Stable, uncomplicated  Clinical Decision Making:Low Complexity amb >30ft with CGA/RW Overall Complexity:LOW     G-Codes (GP)  Mobility  [id number] Current  CI= 1-19%   Goal  CI= 1-19%  The severity rating is based on the functional mobility assessment.     Thank you for this referral.  Laurent Antonio, PT   Time Calculation: 26 mins

## 2017-10-27 NOTE — ED PROVIDER NOTES
[address]  EMERGENCY DEPARTMENT HISTORY AND PHYSICAL EXAM       Date: 10/26/2017   Patient Name: Belkis Cerna   YOB: 1945  Medical Record Number: [id number]    History of Presenting Illness       Chief Complaint   Patient presents with    Syncope        History Provided By:  Patient, family, and POA    Additional History: 11:08 PM   Belkis Cerna is a 81 y.o. male with pmhx of stroke, COPD, and HTN, who presents to the emergency department via EMS C/O syncopal episode lasting 15 minutes onset just PTA. Family reports pt has been \"feeling unwell\" onset 3 days ago with associated symptoms of increased fatigue, generalized weakness, shakiness, intermittent fever/chills (96.1 F in ED), and N/V/D. Per pt's POA, pt had just walked out of the shower when syncopal episode occured. Pt was not able to pick himself off the ground and was found by family. EMS reports on arrival pt was cold to touch and shaking with low O2 sats. Pt denies CP, abdominal pain, and any other Sx or complaints. Primary Care Provider: Glory Allen MD   Specialist:      Past History     Past Medical History:   Past Medical History:   Diagnosis Date    Chronic obstructive pulmonary disease ([facility] [address]     Hypertension     Stroke [facility])         Past Surgical History:   Past Surgical History:   Procedure Laterality Date    HX ORTHOPAEDIC      left leg surgery        Family History:   History reviewed. No pertinent family history. Social History:   Social History   Substance Use Topics    Smoking status: Former Smoker    Smokeless tobacco: Never Used    Alcohol use None        Allergies:   No Known Allergies     Review of Systems   Review of Systems   Constitutional: Positive for chills, fatigue and fever. Cardiovascular: Negative for chest pain. Gastrointestinal: Positive for diarrhea, nausea and vomiting. Negative for abdominal pain.    Musculoskeletal: Positive for back pain and myalgias. Neurological: Positive for tremors, syncope and weakness. All other systems reviewed and are negative. Physical Exam  Vitals:    10/26/17 2233 10/26/17 2327   BP: 128/78    Pulse: (!) 132    Resp: 22    Temp: 96.1 °F (35.6 °C) 98.2 °F (36.8 °C)   SpO2: 100%    Weight: 84.3 kg (185 lb 12.8 oz)    Height: 5' 7\" (1.702 m)        Physical Exam   Constitutional: He is oriented to person, place, and time. He appears well-developed and well-nourished. No distress. Elderly male. NAD. Under the bear hugger. HENT:   Head: Normocephalic and atraumatic. No signs of trauma to scalp. Slight right mouth droop probably chronic for pt. Eyes: Pupils are equal, round, and reactive to light. Neck: Neck supple. Cardiovascular: Regular rhythm, S1 normal, S2 normal and normal heart sounds. Tachycardia present. Pulmonary/Chest: Breath sounds normal. No respiratory distress. He has no decreased breath sounds. He has no wheezes. He has no rhonchi. He has no rales. He exhibits no tenderness. Abdominal: Soft. He exhibits no distension and no mass. There is no tenderness. There is no guarding. Musculoskeletal: Normal range of motion. He exhibits no edema. Lumbar back: He exhibits tenderness (chronic back pain). Right upper arm: He exhibits no swelling and no edema. Left upper arm: He exhibits no swelling and no edema. Right forearm: He exhibits no swelling and no edema. Left forearm: He exhibits no swelling and no edema. Right lower leg: He exhibits no swelling and no edema. Left lower leg: He exhibits no swelling and no edema. Straight leg raise normal.   Neurological: He is alert and oriented to person, place, and time. No cranial nerve deficit. Skin: No rash noted. Psychiatric: He has a normal mood and affect. His behavior is normal. Thought content normal.   Nursing note and vitals reviewed.         Diagnostic Study Results     Labs -      Recent Results (from the past 12 hour(s))   CBC WITH AUTOMATED DIFF    Collection Time: 10/26/17 11:07 PM   Result Value Ref Range    WBC 13.5 (H) 4.6 - 13.2 K/uL    RBC 5.45 4.70 - 5.50 M/uL    HGB 16.8 (H) 13.0 - 16.0 g/dL    HCT 47.8 36.0 - 48.0 %    MCV 87.7 74.0 - 97.0 FL    MCH 30.8 24.0 - 34.0 PG    MCHC 35.1 31.0 - 37.0 g/dL    RDW 13.1 11.6 - 14.5 %    PLATELET 092 139 - 090 K/uL    MPV 10.8 9.2 - 11.8 FL    NEUTROPHILS 83 (H) 40 - 73 %    LYMPHOCYTES 7 (L) 21 - 52 %    MONOCYTES 10 3 - 10 %    EOSINOPHILS 0 0 - 5 %    BASOPHILS 0 0 - 2 %    ABS. NEUTROPHILS 11.2 (H) 1.8 - 8.0 K/UL    ABS. LYMPHOCYTES 1.0 0.9 - 3.6 K/UL    ABS. MONOCYTES 1.3 (H) 0.05 - 1.2 K/UL    ABS. EOSINOPHILS 0.0 0.0 - 0.4 K/UL    ABS. BASOPHILS 0.0 0.0 - 0.06 K/UL    DF AUTOMATED     METABOLIC PANEL, COMPREHENSIVE    Collection Time: 10/26/17 11:07 PM   Result Value Ref Range    Sodium 132 (L) 136 - 145 mmol/L    Potassium 4.2 3.5 - 5.5 mmol/L    Chloride 94 (L) 100 - 108 mmol/L    CO2 23 21 - 32 mmol/L    Anion gap 15 3.0 - 18 mmol/L    Glucose 194 (H) 74 - 99 mg/dL    BUN 34 (H) 7.0 - 18 MG/DL    Creatinine 2.21 (H) 0.6 - 1.3 MG/DL    BUN/Creatinine ratio 15 12 - 20      GFR est AA 36 (L) >60 ml/min/1.73m2    GFR est non-AA 30 (L) >60 ml/min/1.73m2    Calcium 8.7 8.5 - 10.1 MG/DL    Bilirubin, total 0.4 0.2 - 1.0 MG/DL    ALT (SGPT) 22 16 - 61 U/L    AST (SGOT) 18 15 - 37 U/L    Alk.  phosphatase 52 45 - 117 U/L    Protein, total 6.9 6.4 - 8.2 g/dL    Albumin 3.0 (L) 3.4 - 5.0 g/dL    Globulin 3.9 2.0 - 4.0 g/dL    A-G Ratio 0.8 0.8 - 1.7     MAGNESIUM    Collection Time: 10/26/17 11:07 PM   Result Value Ref Range    Magnesium 1.7 1.6 - 2.6 mg/dL   PROTHROMBIN TIME + INR    Collection Time: 10/26/17 11:07 PM   Result Value Ref Range    Prothrombin time 13.5 11.5 - 15.2 sec    INR 1.1 0.8 - 1.2     PTT    Collection Time: 10/26/17 11:07 PM   Result Value Ref Range    aPTT 23.4 23.0 - 36.4 SEC   NT-PRO BNP    Collection Time: 10/26/17 11:07 PM   Result Value Ref Range    NT pro- 0 - 900 PG/ML   CARDIAC PANEL,(CK, CKMB & TROPONIN)    Collection Time: 10/26/17 11:07 PM   Result Value Ref Range    CK 78 39 - 308 U/L    CK - MB 1.1 <3.6 ng/ml    CK-MB Index 1.4 0.0 - 4.0 %    Troponin-I, Qt. <0.02 0.00 - 0.06 NG/ML   D DIMER    Collection Time: 10/26/17 11:07 PM   Result Value Ref Range    D DIMER 1.31 (H) <0.46 ug/ml(FEU)   EKG, 12 LEAD, INITIAL    Collection Time: 10/26/17 11:10 PM   Result Value Ref Range    Ventricular Rate 126 BPM    Atrial Rate 126 BPM    P-R Interval 128 ms    QRS Duration 72 ms    Q-T Interval 300 ms    QTC Calculation (Bezet) 434 ms    Calculated P Axis 77 degrees    Calculated R Axis 73 degrees    Calculated T Axis -92 degrees    Diagnosis       Sinus tachycardia with premature ventricular complexes or fusion complexes  T wave abnormality, consider inferolateral ischemia  Abnormal ECG  When compared with ECG of 05-OCT-2014 14:30,  fusion complexes are now present  premature ventricular complexes are now present  Vent.  rate has increased BY  52 BPM  T wave inversion now evident in Inferior leads  T wave inversion now evident in Lateral leads  Confirmed by Maksim Kim MD. ([id number]) on 10/26/2017 11:35:44 PM     LACTIC ACID    Collection Time: 10/27/17 12:35 AM   Result Value Ref Range    Lactic acid 3.9 (HH) 0.4 - 2.0 MMOL/L   URINALYSIS W/ RFLX MICROSCOPIC    Collection Time: 10/27/17  2:00 AM   Result Value Ref Range    Color DARK YELLOW      Appearance CLEAR      Specific gravity 1.023 1.005 - 1.030      pH (UA) 5.0 5.0 - 8.0      Protein TRACE (A) NEG mg/dL    Glucose NEGATIVE  NEG mg/dL    Ketone TRACE (A) NEG mg/dL    Bilirubin NEGATIVE  NEG      Blood NEGATIVE  NEG      Urobilinogen 0.2 0.2 - 1.0 EU/dL    Nitrites NEGATIVE  NEG      Leukocyte Esterase NEGATIVE  NEG     URINE MICROSCOPIC ONLY    Collection Time: 10/27/17  2:00 AM   Result Value Ref Range    WBC NEGATIVE  0 - 5 /hpf    RBC NEGATIVE  0 - 5 /hpf Epithelial cells NEGATIVE  0 - 5 /lpf    Bacteria NEGATIVE  NEG /hpf    Mucus NEGATIVE  NEG /lpf       Radiologic Studies -  The following have been ordered and reviewed:   CT HEAD WO CONT   Final Result  IMPRESSION:  No acute intracranial abnormalities. As read by the radiologist.       XR CHEST PORT    (Results Pending)   NM LUNG PERFUSION W VENT    (Results Pending)     RADIOLOGY FINDINGS  Chest X-ray shows COPD features  Pending review by Radiologist  Recorded by Amos Nagy ED Scribe, as dictated by [employer], MD       Medical Decision Making   I am the first provider for this patient. I reviewed the vital signs, available nursing notes, past medical history, past surgical history, family history and social history. Vital Signs-Reviewed the patient's vital signs. Patient Vitals for the past 12 hrs:   Temp Pulse Resp BP SpO2   10/26/17 2327 98.2 °F (36.8 °C) - - - -   10/26/17 2233 96.1 °F (35.6 °C) (!) 132 22 128/78 100 %       Pulse Oximetry Analysis - Normal 100% on room air     Cardiac Monitor:   Rate: 126 bpm  Rhythm: Sinus Tachycardia with premature ventricular complexes or fusion complexes    EKG interpretation: (Preliminary)  Rhythm: Sinus Tachycardia with premature ventricular complexes or fusion complexes. Rate (approx.): 126 bpm. T wave abnormality. New inferior-lateral changes since 2014. EKG read by Eliud Elizalde MD at 23:10     Provider Notes:   INITIAL CLINICAL IMPRESSION and PLANS:  The patient presents with the primary complaint(s) of: syncope. The presentation, to include historical aspects and clinical findings are consistent with the DX of syncope. However, other possible DX's to consider as primary, associated with, or exacerbated by include:    1. Stroke  2. PNA  3. Flu  4. UTI  5. Sepsis  6. Dehydration  7.   Medication reaction     Considering the above, my initial management plan to evaluate and therapeutic interventions include the following and as noted in the orders:    1. Blood cultures, lactic acid, CBC, CMP, magnesium, PT + INR, PTT, UA, NT-PRO BNP, cardiac panel  2. CXR, CT head  3. EKG    Procedures:  Sepsis Assessment Note:   1:00 AM  Rafael Shannon MD has seen and assessed the patient as follows:    Capillary Refill:normal/brisk  Cardiopulmonary Evaluation:   Lung Sounds: clear   Cardiac Sounds: tachycardic  Peripheral Pulses: present  Skin Exam: pink and warm    Visit Vitals    /62    Pulse 98    Temp 98.2 °F (36.8 °C)    Resp 11    Ht 5' 7\" (1.702 m)    Wt 84.3 kg (185 lb 12.8 oz)    SpO2 99%    BMI 29.1 kg/m2       ED Course:  11:08 PM  Initial assessment performed. The patients presenting problems have been discussed, and they are in agreement with the care plan formulated and outlined with them. I have encouraged them to ask questions as they arise throughout their visit. 1:16 AM Discussed patient's history, exam, and available diagnostics results with Gifty Beltran MD, internal medicine, who agrees to admit pt to Telemetry for further inpatient management. VQ scan will be done tomorrow. Medications Given in the ED:  Medications   sodium chloride 0.9 % bolus infusion 1,000 mL (not administered)   enoxaparin (LOVENOX) injection 80 mg (not administered)   cefTRIAXone (ROCEPHIN) 1 g in 0.9% sodium chloride (MBP/ADV) 50 mL MBP (0 g IntraVENous IV Completed 10/27/17 0110)   ondansetron (ZOFRAN) injection 4 mg (4 mg IntraVENous Given 10/27/17 0040)   sodium chloride 0.9 % bolus infusion 1,000 mL (0 mL IntraVENous IV Completed 10/27/17 0204)   LORazepam (ATIVAN) injection 0.5 mg (0.5 mg IntraVENous Given 10/27/17 0040)       Admission Note:  1:18 AM Patient is being admitted to the hospital by Gifty Beltran MD. The results of their tests and reasons for their admission have been discussed with them and/or available family.  They convey agreement and understanding for the need to be admitted and for their admission diagnosis. Discussion:  81 y.o. male presented for syncope while at home. Pt was hypothermic and tachycardic per EMS. Was still hypothermic on ED arrival. Required bear hugger. HR was ~130 bpm, but improved with aggressive rehydration. He was worked up for possible sepsis. No obvious source found in ED. Did have breif episode of PC and SOB, but pulse ox was 100%. . EKG shows new ischemic changes since 2014, but troponin was normal. He reported brief episodes of N/V/D, but no abdomen pain. His creatinine is also elevated, most likely from dehydration. Was given 1 g of Rocephin for his sepsis and his lactic acid was 3.9. HR was ~100 after 2 L, pt significantly improved. Conditions on Admission:  Deep Vein Thrombosis is not present at the time of admission. Thrombosis is not present at the time of admission. Urinary Tract Infection is not present at the time of admission. Pneumonia is not present at the time of admission. MRSA is not present at the time of admission. Wound infection is not present at the time of admission. Pressure Ulcer is not present at the time of admission. Critical Care Time:  I have spent 60 minutes of critical care time involved in lab review, consultations with specialist, family decision-making, and documentation. During this entire length of time I was immediately available to the patient. Critical Care: The reason for providing this level of medical care for this critically ill patient was due a critical illness that impaired one or more vital organ systems such that there was a high probability of imminent or life threatening deterioration in the patients condition. This care involved high complexity decision making to assess, manipulate, and support vital system functions, to treat this degreee vital organ system failure and to prevent further life threatening deterioration of the patients condition. Diagnosis   Clinical Impression:   1.  Syncope, unspecified syncope type    2. Sepsis, due to unspecified organism ([address]    3. Acute renal injury ([address]           _______________________________   Attestations: This note is prepared by Bronwyn Quick, acting as a Scribe for Vania Altamirano MD.    Vania Altamirano MD: The scribe's documentation has been prepared under my direction and personally reviewed by me in its entirety.  I confirm that the note above accurately reflects all work, treatment, procedures, and medical decision making performed by me.    _______________________________

## 2017-10-27 NOTE — ED TRIAGE NOTES
Pt was called to Pt's house for Syncope episode. Pt fell when he got out of the shower and he was on the floor for about 15 minutes. Pt arrived cold and shaking with low oxygen saturations.

## 2017-10-27 NOTE — PROGRESS NOTES
Ad[address]. care at this time. 2002 - Hospitalist paged regarding critical lab results. 2004 - Dr. Dat Lao notified of lactic acid lab.    2112 - Pt in bed resting quietly. A&Ox4, 1L NC. Denies chest pain/ SOB. Pain rated 0/10. Shaking in right hand. Discoloration to LLE. Pt stated he has not voided all day. Questioned off-going nurse, nurse stated he has had two occurrences that she is aware of on her shift. No concerns voiced. Bed in lowest position, call bell within reach. Ron Landers - Dr. Dat Lao paged at this time regarding critical Lactic Acid. 12 - Spoke with Dr. Dat Lao at this time regarding lab results. Telephone orders with read back for 500 bolus of NS.    7791 - Hospitalist paged regarding chloraseptic spray.      6238 - Message left for nurse of attempt to contact hospitalist and need for chloraseptic spray request.

## 2017-10-27 NOTE — PROGRESS NOTES
TRANSFER - IN REPORT:    Verbal report received from Genoveva HUERTA on [employer]  being received from Emergency Dept. for routine progression of care      Report consisted of patients Situation, Background, Assessment and   Recommendations(SBAR). Information from the following report(s) SBAR, Kardex, ED Summary, Procedure Summary, Intake/Output, MAR and Recent Results was reviewed with the receiving nurse. Opportunity for questions and clarification was provided. Assessment completed upon patients arrival to unit and care assumed. 0530: Assumed patient care, he was alert and oriented to person, place, time and situation. Respiratory status was stable on room air . Vital signs were stable. MEWS score was a zero. Patient denied any nausea vomiting dizziness or anxiety. White board was updated and explained. Bed was locked and in lowest position. Call bell, water and personal belongings were within reach. Patient had no questions, comments or concerns after bedside shift report. 0700: Patient had an uneventful shift. Respiratory status, vital signs and MEWS score remained stable. Patient was resting quietly with no signs of distress noted. Bed locked and in lowest position. Call bell water and personal belongings were within reach. Patient had no questions, comments or concerns after bedside shift report. Bedside report given to DANI Joseph R.N.

## 2017-10-28 LAB
ANION GAP SERPL CALC-SCNC: 7 MMOL/L (ref 3–18)
BUN SERPL-MCNC: 26 MG/DL (ref 7–18)
BUN/CREAT SERPL: 19 (ref 12–20)
CALCIUM SERPL-MCNC: 7.3 MG/DL (ref 8.5–10.1)
CHLORIDE SERPL-SCNC: 100 MMOL/L (ref 100–108)
CO2 SERPL-SCNC: 25 MMOL/L (ref 21–32)
CREAT SERPL-MCNC: 1.39 MG/DL (ref 0.6–1.3)
ERYTHROCYTE [DISTWIDTH] IN BLOOD BY AUTOMATED COUNT: 13.5 % (ref 11.6–14.5)
GLUCOSE SERPL-MCNC: 110 MG/DL (ref 74–99)
HCT VFR BLD AUTO: 36.2 % (ref 36–48)
HGB BLD-MCNC: 12.4 G/DL (ref 13–16)
LACTATE SERPL-SCNC: 2 MMOL/L (ref 0.4–2)
LACTATE SERPL-SCNC: 2.2 MMOL/L (ref 0.4–2)
LACTATE SERPL-SCNC: 2.6 MMOL/L (ref 0.4–2)
LACTATE SERPL-SCNC: 3.2 MMOL/L (ref 0.4–2)
LACTATE SERPL-SCNC: 3.4 MMOL/L (ref 0.4–2)
MAGNESIUM SERPL-MCNC: 1.5 MG/DL (ref 1.6–2.6)
MCH RBC QN AUTO: 30.5 PG (ref 24–34)
MCHC RBC AUTO-ENTMCNC: 34.3 G/DL (ref 31–37)
MCV RBC AUTO: 89.2 FL (ref 74–97)
PHOSPHATE SERPL-MCNC: 3.2 MG/DL (ref 2.5–4.9)
PLATELET # BLD AUTO: 229 K/UL (ref 135–420)
PMV BLD AUTO: 10.6 FL (ref 9.2–11.8)
POTASSIUM SERPL-SCNC: 3.4 MMOL/L (ref 3.5–5.5)
RBC # BLD AUTO: 4.06 M/UL (ref 4.7–5.5)
SODIUM SERPL-SCNC: 132 MMOL/L (ref 136–145)
WBC # BLD AUTO: 8.3 K/UL (ref 4.6–13.2)

## 2017-10-28 PROCEDURE — 87081 CULTURE SCREEN ONLY: CPT | Performed by: INTERNAL MEDICINE

## 2017-10-28 PROCEDURE — 83605 ASSAY OF LACTIC ACID: CPT | Performed by: HOSPITALIST

## 2017-10-28 PROCEDURE — 94760 N-INVAS EAR/PLS OXIMETRY 1: CPT

## 2017-10-28 PROCEDURE — 84100 ASSAY OF PHOSPHORUS: CPT | Performed by: HOSPITALIST

## 2017-10-28 PROCEDURE — 74011250637 HC RX REV CODE- 250/637: Performed by: HOSPITALIST

## 2017-10-28 PROCEDURE — 36415 COLL VENOUS BLD VENIPUNCTURE: CPT | Performed by: HOSPITALIST

## 2017-10-28 PROCEDURE — 80048 BASIC METABOLIC PNL TOTAL CA: CPT | Performed by: HOSPITALIST

## 2017-10-28 PROCEDURE — 85027 COMPLETE CBC AUTOMATED: CPT | Performed by: HOSPITALIST

## 2017-10-28 PROCEDURE — 77010033678 HC OXYGEN DAILY

## 2017-10-28 PROCEDURE — 74011250636 HC RX REV CODE- 250/636: Performed by: HOSPITALIST

## 2017-10-28 PROCEDURE — 74011000250 HC RX REV CODE- 250: Performed by: INTERNAL MEDICINE

## 2017-10-28 PROCEDURE — 74011250637 HC RX REV CODE- 250/637: Performed by: INTERNAL MEDICINE

## 2017-10-28 PROCEDURE — 83735 ASSAY OF MAGNESIUM: CPT | Performed by: HOSPITALIST

## 2017-10-28 PROCEDURE — 94640 AIRWAY INHALATION TREATMENT: CPT

## 2017-10-28 PROCEDURE — 65660000000 HC RM CCU STEPDOWN

## 2017-10-28 PROCEDURE — 74011000250 HC RX REV CODE- 250: Performed by: HOSPITALIST

## 2017-10-28 RX ORDER — LORATADINE 10 MG/1
10 TABLET ORAL DAILY
Status: DISCONTINUED | OUTPATIENT
Start: 2017-10-28 | End: 2017-10-31 | Stop reason: HOSPADM

## 2017-10-28 RX ORDER — IPRATROPIUM BROMIDE AND ALBUTEROL SULFATE 2.5; .5 MG/3ML; MG/3ML
3 SOLUTION RESPIRATORY (INHALATION)
Status: DISCONTINUED | OUTPATIENT
Start: 2017-10-28 | End: 2017-10-31 | Stop reason: HOSPADM

## 2017-10-28 RX ADMIN — HEPARIN SODIUM 5000 UNITS: 5000 INJECTION, SOLUTION INTRAVENOUS; SUBCUTANEOUS at 09:41

## 2017-10-28 RX ADMIN — SERTRALINE HYDROCHLORIDE 100 MG: 50 TABLET ORAL at 09:41

## 2017-10-28 RX ADMIN — HEPARIN SODIUM 5000 UNITS: 5000 INJECTION, SOLUTION INTRAVENOUS; SUBCUTANEOUS at 00:37

## 2017-10-28 RX ADMIN — PRAVASTATIN SODIUM 40 MG: 20 TABLET ORAL at 21:21

## 2017-10-28 RX ADMIN — SODIUM CHLORIDE 125 ML/HR: 900 INJECTION, SOLUTION INTRAVENOUS at 20:25

## 2017-10-28 RX ADMIN — IPRATROPIUM BROMIDE AND ALBUTEROL SULFATE 3 ML: .5; 3 SOLUTION RESPIRATORY (INHALATION) at 04:00

## 2017-10-28 RX ADMIN — CHLORASEPTIC 1 SPRAY: 1.5 LIQUID ORAL at 11:32

## 2017-10-28 RX ADMIN — UMECLIDINIUM 1 PUFF: 62.5 AEROSOL, POWDER ORAL at 09:44

## 2017-10-28 RX ADMIN — ASPIRIN 81 MG 81 MG: 81 TABLET ORAL at 09:41

## 2017-10-28 RX ADMIN — HEPARIN SODIUM 5000 UNITS: 5000 INJECTION, SOLUTION INTRAVENOUS; SUBCUTANEOUS at 16:40

## 2017-10-28 RX ADMIN — LORATADINE 10 MG: 10 TABLET ORAL at 12:30

## 2017-10-28 RX ADMIN — SODIUM CHLORIDE 500 ML: 900 INJECTION, SOLUTION INTRAVENOUS at 00:37

## 2017-10-28 RX ADMIN — SODIUM CHLORIDE 125 ML/HR: 900 INJECTION, SOLUTION INTRAVENOUS at 12:30

## 2017-10-28 RX ADMIN — IPRATROPIUM BROMIDE AND ALBUTEROL SULFATE 3 ML: .5; 3 SOLUTION RESPIRATORY (INHALATION) at 20:24

## 2017-10-28 RX ADMIN — OMEPRAZOLE 20 MG: 20 CAPSULE, DELAYED RELEASE ORAL at 09:41

## 2017-10-28 RX ADMIN — IPRATROPIUM BROMIDE AND ALBUTEROL SULFATE 3 ML: .5; 3 SOLUTION RESPIRATORY (INHALATION) at 07:07

## 2017-10-28 NOTE — ROUTINE PROCESS
1740  Bedside and Verbal shift change report given to Erik Foley RN (oncoming nurse) by Asa Palma RN (offgoing nurse). Report included the following information SBAR, Kardex and MAR.

## 2017-10-28 NOTE — PROGRESS NOTES
PATIENT C/O SORE THROAT FROM [facility]. SPO2 98% ON RA. . BS CTA. Kolby Daigle PENDING ASSESSMENT BY DR. Payton Dugan.

## 2017-10-28 NOTE — PROGRESS NOTES
Problem: Falls - Risk of  Goal: *Absence of Falls  Document Christiano Fall Risk and appropriate interventions in the flowsheet. Outcome: Progressing Towards Goal  Fall Risk Interventions:  Mobility Interventions: Communicate number of staff needed for ambulation/transfer         Medication Interventions: Patient to call before getting OOB    Elimination Interventions: Call light in reach    History of Falls Interventions:  Investigate reason for fall, Room close to nurse's station

## 2017-10-28 NOTE — PROGRESS NOTES
Patient declining to participate in PT at this time, stating that his throat hurts and he is trying to finish his breakfast. Agrees to work with PT a little later in am.  Will return for 2nd attempt.

## 2017-10-28 NOTE — PROGRESS NOTES
Hospitalist Progress Note    Patient: Leeroy Leyden MRN: [id number]  CSN: [id number]    YOB: 1945  Age: 81 y.o. Sex: male    DOA: 10/26/2017 LOS:  LOS: 1 day          Chief Complaint: Acute kidney injury          Assessment/Plan     Syncopal episode - likely secondary to orthostatic hypotension from dehydration. Monitor in telemetry. Sore throat - get rapid strep. Erythema w/o exudate on exam.  Possibly 2/2 scheduled neb which will be converted to as needed scheduling     YA - pre renal, intravascular volume depletion. As evidence by hemoconcentration improving on IVF,   Following renal function     -Urine indices and ultrasound negative.      Unlikely sepsis - his lactic acid elevation is secondary to hypoperfusion. No evidence or source of infection. Continue to monitor.      COPD - no active wheezing, duo nebs as needed.      HTN - hold lisinopril and hctz.     Elevated D-dimer - VQ scan low prob for PE.     DVT prophylaxis     Estimated length of stay : 1-2    Disposition :  Patient Active Problem List   Diagnosis Code    Syncope R55    Acute renal injury ([facility] [address] N17.9    Hypertension I10    Chronic obstructive pulmonary disease (HCC) J44.9    Dehydration E86.0       Subjective: \"The PIV on the left is painful and I don't think it works\"  No events over night. Patient doing well this AM.  No new complaints.         Review of systems:    Constitutional: denies fevers, chills, myalgias  Respiratory: denies SOB, cough  Cardiovascular: denies chest pain, palpitations  Gastrointestinal: denies nausea, vomiting, diarrhea      Vital signs/Intake and Output:  Visit Vitals    /57    Pulse (!) 120    Temp 98.6 °F (37 °C)    Resp 16    Ht 5' 7\" (1.702 m)    Wt 84.2 kg (185 lb 10 oz)    SpO2 100%    BMI 29.07 kg/m2     Current Shift:  10/28 0701 - 10/28 1900  In: 0   Out: 350 [Urine:350]  Last three shifts:  10/26 1901 - 10/28 0700  In: 591 [P.O.:591]  Out: 400 [Urine:400]    Exam:    General: Well developed, alert, NAD, OX3  Head/Neck: NCAT, supple, No masses, No lymphadenopathy  CVS:Regular rate and rhythm, no M/R/G, S1/S2 heard, no thrill  Lungs:Clear to auscultation bilaterally, no wheezes, rhonchi, or rales  Abdomen: Soft, Nontender, No distention, Normal Bowel sounds, No hepatomegaly  Extremities: No C/C/E, pulses palpable 2+  Skin:normal texture and turgor, no rashes, no lesions  Neuro:grossly normal , follows commands  Psych:appropriate                Labs: Results:       Chemistry Recent Labs      10/28/17   0311  10/26/17   2307   GLU  110*  194*   NA  132*  132*   K  3.4*  4.2   CL  100  94*   CO2  25  23   BUN  26*  34*   CREA  1.39*  2.21*   CA  7.3*  8.7   AGAP  7  15   BUCR  19  15   AP   --   52   TP   --   6.9   ALB   --   3.0*   GLOB   --   3.9   AGRAT   --   0.8      CBC w/Diff Recent Labs      10/28/17   0311  10/26/17   2307   WBC  8.3  13.5*   RBC  4.06*  5.45   HGB  12.4*  16.8*   HCT  36.2  47.8   PLT  229  302   GRANS   --   83*   LYMPH   --   7*   EOS   --   0      Cardiac Enzymes Recent Labs      10/26/17   2307   CPK  78   CKND1  1.4      Coagulation Recent Labs      10/26/17   2307   PTP  13.5   INR  1.1   APTT  23.4       Lipid Panel No results found for: CHOL, CHOLPOCT, CHOLX, CHLST, CHOLV, 280626, HDL, LDL, LDLC, DLDLP, 157563, VLDLC, VLDL, TGLX, TRIGL, TRIGP, TGLPOCT, CHHD, CHHDX   BNP No results for input(s): BNPP in the last 72 hours.    Liver Enzymes Recent Labs      10/26/17   2307   TP  6.9   ALB  3.0*   AP  52   SGOT  18      Thyroid Studies No results found for: T4, T3U, TSH, TSHEXT     Procedures/imaging: see electronic medical records for all procedures/Xrays and details which were not copied into this note but were reviewed prior to creation of Jenn Rudy, MD

## 2017-10-28 NOTE — PROGRESS NOTES
0758  Pt is awake, alert, oriented x 3. Lying in bed. Pt c/o sorethroat. Pt has shaking or right hand from parkinsons. 9:00 . Critical result  for lactic Acid received from ANN Anne. Lactic acid is 3.2.    0910  Dr Trav Jin was paged . 9:20 AM   RN spoke to Dr Trav Jin. Dr Trav Jin aware of lactic acid result, Sorethroat, and elevated HR of 120 but went up to 135. As per MD, continue IV NSS at 125 ml/ hr.  Will order Chloraseptic spray  And rapid Strep throat. 9:48 AM  Throat swab for Rapid strep throat sent to lab. Pt with occasional cough. On o2 at 1 L nc. Lungs with expiratory wheezes. Abdomen obese with hypoactive BS.   11:32 AM   Pt was seen by Dr Trav Jin earlier. Pt medicated with Chloraseptic spray for sorethroqat. Pt also complaining of nasal congestion. 12:33 PM   Pt medicated with Claritin 1 tab po for nasal congestion. 2:37 PM   Lactic Acid was 3.4 at 1100 and 2.0 at this time as per Lab.   1500   Sorethroat and nasal congestion less  As per pt. Pt is more comfortable.

## 2017-10-28 NOTE — ROUTINE PROCESS
Bedside and Verbal shift change report given to Criss Montoya RN by Adolph Shepard RN. Report included the following information SBAR, Kardex, OR Summary, Intake/Output and MAR.

## 2017-10-29 ENCOUNTER — APPOINTMENT (OUTPATIENT)
Dept: GENERAL RADIOLOGY | Age: 72
DRG: 682 | End: 2017-10-29
Attending: HOSPITALIST
Payer: MEDICARE

## 2017-10-29 LAB
ANION GAP SERPL CALC-SCNC: 8 MMOL/L (ref 3–18)
BUN SERPL-MCNC: 14 MG/DL (ref 7–18)
BUN/CREAT SERPL: 13 (ref 12–20)
CALCIUM SERPL-MCNC: 6.8 MG/DL (ref 8.5–10.1)
CHLORIDE SERPL-SCNC: 103 MMOL/L (ref 100–108)
CO2 SERPL-SCNC: 23 MMOL/L (ref 21–32)
CREAT SERPL-MCNC: 1.04 MG/DL (ref 0.6–1.3)
ERYTHROCYTE [DISTWIDTH] IN BLOOD BY AUTOMATED COUNT: 13.6 % (ref 11.6–14.5)
GLUCOSE SERPL-MCNC: 87 MG/DL (ref 74–99)
HCT VFR BLD AUTO: 32.5 % (ref 36–48)
HGB BLD-MCNC: 11.3 G/DL (ref 13–16)
MCH RBC QN AUTO: 30.8 PG (ref 24–34)
MCHC RBC AUTO-ENTMCNC: 34.8 G/DL (ref 31–37)
MCV RBC AUTO: 88.6 FL (ref 74–97)
PLATELET # BLD AUTO: 198 K/UL (ref 135–420)
PMV BLD AUTO: 10.3 FL (ref 9.2–11.8)
POTASSIUM SERPL-SCNC: 3.5 MMOL/L (ref 3.5–5.5)
RBC # BLD AUTO: 3.67 M/UL (ref 4.7–5.5)
SODIUM SERPL-SCNC: 134 MMOL/L (ref 136–145)
WBC # BLD AUTO: 4.9 K/UL (ref 4.6–13.2)

## 2017-10-29 PROCEDURE — 71010 XR CHEST PORT: CPT

## 2017-10-29 PROCEDURE — 74011250636 HC RX REV CODE- 250/636: Performed by: FAMILY MEDICINE

## 2017-10-29 PROCEDURE — 65660000000 HC RM CCU STEPDOWN

## 2017-10-29 PROCEDURE — 74011000250 HC RX REV CODE- 250: Performed by: INTERNAL MEDICINE

## 2017-10-29 PROCEDURE — 97116 GAIT TRAINING THERAPY: CPT

## 2017-10-29 PROCEDURE — 94760 N-INVAS EAR/PLS OXIMETRY 1: CPT

## 2017-10-29 PROCEDURE — 80048 BASIC METABOLIC PNL TOTAL CA: CPT | Performed by: HOSPITALIST

## 2017-10-29 PROCEDURE — 74011250637 HC RX REV CODE- 250/637: Performed by: INTERNAL MEDICINE

## 2017-10-29 PROCEDURE — 77010033678 HC OXYGEN DAILY

## 2017-10-29 PROCEDURE — 36415 COLL VENOUS BLD VENIPUNCTURE: CPT | Performed by: HOSPITALIST

## 2017-10-29 PROCEDURE — 97530 THERAPEUTIC ACTIVITIES: CPT

## 2017-10-29 PROCEDURE — 74011250636 HC RX REV CODE- 250/636: Performed by: HOSPITALIST

## 2017-10-29 PROCEDURE — 74011250637 HC RX REV CODE- 250/637: Performed by: HOSPITALIST

## 2017-10-29 PROCEDURE — 85027 COMPLETE CBC AUTOMATED: CPT | Performed by: HOSPITALIST

## 2017-10-29 PROCEDURE — 94640 AIRWAY INHALATION TREATMENT: CPT

## 2017-10-29 RX ORDER — FLUTICASONE FUROATE AND VILANTEROL 100; 25 UG/1; UG/1
1 POWDER RESPIRATORY (INHALATION) DAILY
Status: DISCONTINUED | OUTPATIENT
Start: 2017-10-30 | End: 2017-10-31 | Stop reason: HOSPADM

## 2017-10-29 RX ADMIN — SODIUM CHLORIDE 125 ML/HR: 900 INJECTION, SOLUTION INTRAVENOUS at 10:46

## 2017-10-29 RX ADMIN — IPRATROPIUM BROMIDE AND ALBUTEROL SULFATE 3 ML: .5; 3 SOLUTION RESPIRATORY (INHALATION) at 16:21

## 2017-10-29 RX ADMIN — HEPARIN SODIUM 5000 UNITS: 5000 INJECTION, SOLUTION INTRAVENOUS; SUBCUTANEOUS at 10:49

## 2017-10-29 RX ADMIN — HEPARIN SODIUM 5000 UNITS: 5000 INJECTION, SOLUTION INTRAVENOUS; SUBCUTANEOUS at 02:37

## 2017-10-29 RX ADMIN — LORATADINE 10 MG: 10 TABLET ORAL at 10:49

## 2017-10-29 RX ADMIN — SODIUM CHLORIDE 125 ML/HR: 900 INJECTION, SOLUTION INTRAVENOUS at 03:53

## 2017-10-29 RX ADMIN — SERTRALINE HYDROCHLORIDE 100 MG: 50 TABLET ORAL at 10:49

## 2017-10-29 RX ADMIN — OMEPRAZOLE 20 MG: 20 CAPSULE, DELAYED RELEASE ORAL at 10:49

## 2017-10-29 RX ADMIN — SODIUM CHLORIDE 50 ML/HR: 900 INJECTION, SOLUTION INTRAVENOUS at 20:32

## 2017-10-29 RX ADMIN — ASPIRIN 81 MG 81 MG: 81 TABLET ORAL at 10:49

## 2017-10-29 RX ADMIN — IPRATROPIUM BROMIDE AND ALBUTEROL SULFATE 3 ML: .5; 3 SOLUTION RESPIRATORY (INHALATION) at 20:39

## 2017-10-29 RX ADMIN — IPRATROPIUM BROMIDE AND ALBUTEROL SULFATE 3 ML: .5; 3 SOLUTION RESPIRATORY (INHALATION) at 11:10

## 2017-10-29 RX ADMIN — HEPARIN SODIUM 5000 UNITS: 5000 INJECTION, SOLUTION INTRAVENOUS; SUBCUTANEOUS at 17:04

## 2017-10-29 RX ADMIN — UMECLIDINIUM 1 PUFF: 62.5 AEROSOL, POWDER ORAL at 11:45

## 2017-10-29 NOTE — PROGRESS NOTES
Hospitalist Progress Note    Patient: Dayna Laughlin MRN: [id number]  CSN: [id number]    YOB: 1945  Age: 81 y.o. Sex: male    DOA: 10/26/2017 LOS:  LOS: 2 days          Chief Complaint: Acute kidney injury          Assessment/Plan     Syncopal episode - likely secondary to orthostatic hypotension from dehydration. Monitor in telemetry. Sore throat; improving - rapid strep; negative. Erythema w/o exudate on exam.      YA - pre renal, intravascular volume depletion. As evidence by hemoconcentration improving on IVF,   Following renal function     -Urine indices and ultrasound negative.      Unlikely sepsis - lactic acid elevation secondary to hypoperfusion; normalized. No evidence or source of infection. Continue to monitor.      COPD - no active wheezing, duo nebs as needed.      HTN - hold lisinopril and hctz.     Elevated D-dimer - VQ scan low prob for PE.     DVT prophylaxis     Estimated length of stay : 1-2    Disposition :  Patient Active Problem List   Diagnosis Code    Syncope R55    Acute renal injury ([address] N17.9    Hypertension I10    Chronic obstructive pulmonary disease (HCC) J44.9    Dehydration E86.0       Subjective:    \"I'm doing much better! \"  No events over night. Patient doing well this AM.  No new complaints.         Review of systems:    Constitutional: denies fevers, chills, myalgias  Respiratory: denies SOB, cough  Cardiovascular: denies chest pain, palpitations  Gastrointestinal: denies nausea, vomiting, diarrhea      Vital signs/Intake and Output:  Visit Vitals    /58    Pulse (!) 110    Temp 98.5 °F (36.9 °C)    Resp 18    Ht 5' 7\" (1.702 m)    Wt 84.2 kg (185 lb 10 oz)    SpO2 98%    BMI 29.07 kg/m2     Current Shift:     Last three shifts:  10/27 1901 - 10/29 0700  In: 1449.2 [P.O.:591; I.V.:858.2]  Out: 1050 [Urine:1050]    Exam:    General: Well developed, alert, NAD, OX3  Head/Neck: NCAT, supple, No masses, No lymphadenopathy  CVS:Regular rate and rhythm, no M/R/G, S1/S2 heard, no thrill  Lungs:Clear to auscultation bilaterally, no wheezes, rhonchi, or rales  Abdomen: Soft, Nontender, No distention, Normal Bowel sounds, No hepatomegaly  Extremities: No C/C/E, pulses palpable 2+  Skin:normal texture and turgor, no rashes, no lesions  Neuro:grossly normal , follows commands  Psych:appropriate                Labs: Results:       Chemistry Recent Labs      10/29/17   0110  10/28/17   0311  10/26/17   2307   GLU  87  110*  194*   NA  134*  132*  132*   K  3.5  3.4*  4.2   CL  103  100  94*   CO2  23  25  23   BUN  14  26*  34*   CREA  1.04  1.39*  2.21*   CA  6.8*  7.3*  8.7   AGAP  8  7  15   BUCR  13  19  15   AP   --    --   52   TP   --    --   6.9   ALB   --    --   3.0*   GLOB   --    --   3.9   AGRAT   --    --   0.8      CBC w/Diff Recent Labs      10/29/17   0110  10/28/17   0311  10/26/17   2307   WBC  4.9  8.3  13.5*   RBC  3.67*  4.06*  5.45   HGB  11.3*  12.4*  16.8*   HCT  32.5*  36.2  47.8   PLT  198  229  302   GRANS   --    --   83*   LYMPH   --    --   7*   EOS   --    --   0      Cardiac Enzymes Recent Labs      10/26/17   2307   CPK  78   CKND1  1.4      Coagulation Recent Labs      10/26/17   2307   PTP  13.5   INR  1.1   APTT  23.4       Lipid Panel No results found for: CHOL, CHOLPOCT, CHOLX, CHLST, CHOLV, 450497, HDL, LDL, LDLC, DLDLP, 248691, VLDLC, VLDL, TGLX, TRIGL, TRIGP, TGLPOCT, CHHD, CHHDX   BNP No results for input(s): BNPP in the last 72 hours.    Liver Enzymes Recent Labs      10/26/17   2307   TP  6.9   ALB  3.0*   AP  52   SGOT  18      Thyroid Studies No results found for: T4, T3U, TSH, TSHEXT, TSHEXT     Procedures/imaging: see electronic medical records for all procedures/Xrays and details which were not copied into this note but were reviewed prior to creation of Kendra Rodriguez MD

## 2017-10-29 NOTE — PROGRESS NOTES
RESPIRATORY NOTE:  Pt experiencing more wheezes and coarseness and decreasing SPO2, PRN neb tx given and increased nasal cannula RN to call MD as condition has changed somewhat.

## 2017-10-29 NOTE — ROUTINE PROCESS
Bedside shift change report given to [address] (oncoming nurse) by Marcello Colon RN (offgoing nurse). Report included the following information SBAR, Kardex and MAR.

## 2017-10-29 NOTE — PROGRESS NOTES
1120 Assessment completed and documented. Patient alert and oriented x 4,Pain 0/10on a 0-10/10 numeric scale. lung sounds course expiratory wheezing. Respiratory therapy paged and came up to give patient duoneb treatment. patient NSR at 91 bpm.  NAD. Patient encouraged to order tray. 1300 Patient and emergency contact both request that the patient go to Rehab at a SNF at time of discharge. Patient not sure he is strong enough to be by himself at this time and patient lives alone. 1320 patient unable to void bladderscan showed >960 ml  in bladder. Page placed to Dr. Jon Brooks   Received orders to 3001 [facility] patient and then if patient does not void in 4 hours after that to consult urology and place diaz Cath. 1400 Physical therapy reported that patient had voided in toilet but that there had not been a hat in the room and no urine collected for measurement. No BM. 1640 called to room. Respiratory at bedside. Patient having difficulty breathing. O2 Sat 85% on 2 L via NC patient has course wheezing bilaterally. Fluid rate decreased to 50 ml/h  Page placed to Dr. Subha Carter on call. orders received to keep fluids at 50 ML/H  D/C spiriva and start patient on Breo. 1733 Patient sating 100% on 5 L respiratory cut down O2 to 3 L via NC.  1750 Patient tolerating 5 l well RT decreased O2 to 3 L O2 SAT continues at 100 %  1910 Bedside shift change report given to Stuart Malave (oncoming nurse) by Yris Kerns (offgoing nurse). Report included the following information SBAR, Kardex, Intake/Output and MAR   1920 Spoke to Dr. Nadeen Rust about continuing ccontinuous O2 sat monitoring.   MD stated that was fine

## 2017-10-29 NOTE — PROGRESS NOTES
1930 - Assumed care at this time. 2021 - Pt resting in bed. C/o sob, coarse wheezing noted on auscultation. Respiratory paged for prn treatment. Pt A&Ox4, 3L NC. 20G right ACE intact and patent. Discoloration to LLE. Pain rated 0/10. No concerns voiced. Bed in lowest position, call bell within reach.

## 2017-10-29 NOTE — PROGRESS NOTES
Problem: Mobility Impaired (Adult and Pediatric)  Goal: *Acute Goals and Plan of Care (Insert Text)  Physical Therapy Goals LT/ST  Initiated 10/27/2017 and to be accomplished within 5-7 day(s)  1. Patient will move from supine <> sit with S/mod I in prep for out of bed activity and change of position. 2.  Patient will perform sit<> stand with S/mod I with LRAD in prep for transfers/ambulation. 3.  Patient will transfer from bed <> chair with S with LRAD for time up in chair for completion of ADL activity. 4.  Patient will ambulate 150 feet with S/mod I with LRAD for improved functional mobility/safe discharge. 5.  Patient will ascend/descend 3-5 stairs with handrail(s) with minimal assistance/contact guard assist for home re-entry as needed. Outcome: Progressing Towards Goal  physical Therapy TREATMENT    Patient: Elise Art (89 y.o. male)  Date: 10/29/2017  Diagnosis: Syncope  Sepsis ([facility] [address]  Syncope  Sepsis ([facility] [address]  Acute renal injury ([facility] [address] Acute renal injury [facility])  Precautions: Fall   Chart, physical therapy assessment, plan of care and goals were reviewed. ASSESSMENT:  Assisted pt to restroom with RW. Pt able to void unmeasured amount of urine. Nursing aware. Pt then, able to increase ambulation distance with RW. Slow step to gait noted. No LOB. Moderate fatigue noted due to above. Pt reporting mild chest tightness. Pt willing to remain sitting in chair post tx. Cont POC. Progression toward goals:  []      Improving appropriately and progressing toward goals  [x]      Improving slowly and progressing toward goals  []      Not making progress toward goals and plan of care will be adjusted     PLAN:  Patient continues to benefit from skilled intervention to address the above impairments. Continue treatment per established plan of care.   Discharge Recommendations:  Rehab  Further Equipment Recommendations for Discharge:  rolling walker     SUBJECTIVE:   Patient stated  I need to try to get to the bathroom    OBJECTIVE DATA SUMMARY:   Critical Behavior:  Neurologic State: Alert  Orientation Level: Oriented X4  Cognition: Appropriate decision making  Safety/Judgement: Awareness of environment  Functional Mobility Training:  Bed Mobility:  Supine to Sit: Supervision  Scooting: Supervision  Transfers:  Sit to Stand: Contact guard assistance  Stand to Sit: Contact guard assistance  Balance:  Sitting: Intact  Standing: Intact; With support  Ambulation/Gait Training:  Distance (ft): 80 Feet (ft)  Assistive Device: Walker, rolling;Gait belt  Ambulation - Level of Assistance: Contact guard assistance  Gait Abnormalities: Decreased step clearance; Step to gait  Speed/Riri: Slow  Step Length: Right shortened;Left shortened  Interventions: Verbal cues; Safety awareness training  Activity Tolerance:   Fair     After treatment:   [x] Patient left in no apparent distress sitting up in chair  [] Patient left in no apparent distress in bed  [x] Call bell left within reach  [x] Nursing notified  [x] Caregiver present  [] Bed alarm activated      Cezar Murray PTA   Time Calculation: 24 mins

## 2017-10-29 NOTE — PROGRESS NOTES
Problem: Falls - Risk of  Goal: *Absence of Falls  Document Christiano Fall Risk and appropriate interventions in the flowsheet.    Fall Risk Interventions:  Mobility Interventions: Assess mobility with egress test, Patient to call before getting OOB            Medication Interventions: Patient to call before getting OOB    Elimination Interventions: Patient to call for help with toileting needs    History of Falls Interventions: Room close to nurse's station, Door open when patient unattended

## 2017-10-30 LAB
ANION GAP SERPL CALC-SCNC: 9 MMOL/L (ref 3–18)
B-HEM STREP THROAT QL CULT: NEGATIVE
B-HEM STREP THROAT QL CULT: NORMAL
BACTERIA SPEC CULT: NORMAL
BUN SERPL-MCNC: 11 MG/DL (ref 7–18)
BUN/CREAT SERPL: 10 (ref 12–20)
CALCIUM SERPL-MCNC: 7 MG/DL (ref 8.5–10.1)
CHLORIDE SERPL-SCNC: 101 MMOL/L (ref 100–108)
CO2 SERPL-SCNC: 24 MMOL/L (ref 21–32)
CREAT SERPL-MCNC: 1.13 MG/DL (ref 0.6–1.3)
ERYTHROCYTE [DISTWIDTH] IN BLOOD BY AUTOMATED COUNT: 13.9 % (ref 11.6–14.5)
GLUCOSE SERPL-MCNC: 95 MG/DL (ref 74–99)
HCT VFR BLD AUTO: 31.4 % (ref 36–48)
HGB BLD-MCNC: 10.9 G/DL (ref 13–16)
MCH RBC QN AUTO: 31 PG (ref 24–34)
MCHC RBC AUTO-ENTMCNC: 34.7 G/DL (ref 31–37)
MCV RBC AUTO: 89.2 FL (ref 74–97)
PLATELET # BLD AUTO: 179 K/UL (ref 135–420)
PMV BLD AUTO: 10.2 FL (ref 9.2–11.8)
POTASSIUM SERPL-SCNC: 3.4 MMOL/L (ref 3.5–5.5)
RBC # BLD AUTO: 3.52 M/UL (ref 4.7–5.5)
SERVICE CMNT-IMP: NORMAL
SODIUM SERPL-SCNC: 134 MMOL/L (ref 136–145)
TSH SERPL DL<=0.05 MIU/L-ACNC: 0.92 UIU/ML (ref 0.36–3.74)
WBC # BLD AUTO: 4.5 K/UL (ref 4.6–13.2)

## 2017-10-30 PROCEDURE — 74011250636 HC RX REV CODE- 250/636: Performed by: HOSPITALIST

## 2017-10-30 PROCEDURE — 85027 COMPLETE CBC AUTOMATED: CPT | Performed by: HOSPITALIST

## 2017-10-30 PROCEDURE — 74011250637 HC RX REV CODE- 250/637: Performed by: INTERNAL MEDICINE

## 2017-10-30 PROCEDURE — 65660000000 HC RM CCU STEPDOWN

## 2017-10-30 PROCEDURE — 97116 GAIT TRAINING THERAPY: CPT

## 2017-10-30 PROCEDURE — 74011000250 HC RX REV CODE- 250: Performed by: INTERNAL MEDICINE

## 2017-10-30 PROCEDURE — 74011250637 HC RX REV CODE- 250/637: Performed by: HOSPITALIST

## 2017-10-30 PROCEDURE — 84443 ASSAY THYROID STIM HORMONE: CPT | Performed by: HOSPITALIST

## 2017-10-30 PROCEDURE — 94640 AIRWAY INHALATION TREATMENT: CPT

## 2017-10-30 PROCEDURE — 36415 COLL VENOUS BLD VENIPUNCTURE: CPT | Performed by: HOSPITALIST

## 2017-10-30 PROCEDURE — 74011250637 HC RX REV CODE- 250/637: Performed by: FAMILY MEDICINE

## 2017-10-30 PROCEDURE — 77010033678 HC OXYGEN DAILY

## 2017-10-30 PROCEDURE — 80048 BASIC METABOLIC PNL TOTAL CA: CPT | Performed by: HOSPITALIST

## 2017-10-30 RX ORDER — POTASSIUM CHLORIDE 20 MEQ/1
40 TABLET, EXTENDED RELEASE ORAL
Status: COMPLETED | OUTPATIENT
Start: 2017-10-30 | End: 2017-10-30

## 2017-10-30 RX ORDER — POLYETHYLENE GLYCOL 3350 17 G/17G
17 POWDER, FOR SOLUTION ORAL DAILY
Status: DISCONTINUED | OUTPATIENT
Start: 2017-10-30 | End: 2017-10-31 | Stop reason: HOSPADM

## 2017-10-30 RX ADMIN — HEPARIN SODIUM 5000 UNITS: 5000 INJECTION, SOLUTION INTRAVENOUS; SUBCUTANEOUS at 10:00

## 2017-10-30 RX ADMIN — HEPARIN SODIUM 5000 UNITS: 5000 INJECTION, SOLUTION INTRAVENOUS; SUBCUTANEOUS at 16:45

## 2017-10-30 RX ADMIN — SERTRALINE HYDROCHLORIDE 100 MG: 50 TABLET ORAL at 10:01

## 2017-10-30 RX ADMIN — POTASSIUM CHLORIDE 40 MEQ: 20 TABLET, EXTENDED RELEASE ORAL at 11:03

## 2017-10-30 RX ADMIN — IPRATROPIUM BROMIDE AND ALBUTEROL SULFATE 3 ML: .5; 3 SOLUTION RESPIRATORY (INHALATION) at 10:25

## 2017-10-30 RX ADMIN — LORATADINE 10 MG: 10 TABLET ORAL at 10:01

## 2017-10-30 RX ADMIN — PRAVASTATIN SODIUM 40 MG: 20 TABLET ORAL at 21:45

## 2017-10-30 RX ADMIN — OMEPRAZOLE 20 MG: 20 CAPSULE, DELAYED RELEASE ORAL at 10:00

## 2017-10-30 RX ADMIN — HEPARIN SODIUM 5000 UNITS: 5000 INJECTION, SOLUTION INTRAVENOUS; SUBCUTANEOUS at 00:04

## 2017-10-30 RX ADMIN — METHYLPREDNISOLONE SODIUM SUCCINATE 40 MG: 40 INJECTION, POWDER, FOR SOLUTION INTRAMUSCULAR; INTRAVENOUS at 20:24

## 2017-10-30 RX ADMIN — PRAVASTATIN SODIUM 40 MG: 20 TABLET ORAL at 00:04

## 2017-10-30 RX ADMIN — ASPIRIN 81 MG 81 MG: 81 TABLET ORAL at 10:01

## 2017-10-30 RX ADMIN — FLUTICASONE FUROATE AND VILANTEROL TRIFENATATE 1 PUFF: 100; 25 POWDER RESPIRATORY (INHALATION) at 09:59

## 2017-10-30 RX ADMIN — IPRATROPIUM BROMIDE AND ALBUTEROL SULFATE 3 ML: .5; 3 SOLUTION RESPIRATORY (INHALATION) at 20:23

## 2017-10-30 NOTE — PROGRESS NOTES
RESPIRATORY NOTE:  Pt says he is feeling much better this a.m. He is still experiencing coarse breath sounds and expiratory wheezes. Neb tx given, no respiratory distress at this time, prefers not to wear his O2 and his SPO2 is acceptable at this time, will continue to monitor.

## 2017-10-30 NOTE — PROGRESS NOTES
Hospitalist Progress Note    Patient: Justin Bahena MRN: [id number]  CSN: [id number]    YOB: 1945  Age: 81 y.o. Sex: male    DOA: 10/26/2017 LOS:  LOS: 3 days                Assessment/Plan     Patient Active Problem List   Diagnosis Code    Syncope R55    Acute renal injury ([facility] [address] N17.9    Hypertension I10    Chronic obstructive pulmonary disease ([facility] J44.9    Dehydration E86.0        82 yo male admitted for syncopal episode    Feels much better. Syncopal episode - likely secondary to orthostatic hypotension from dehydration. No further episodes.      YA - pre renal, intravascular volume depletion. Improved with IVF,     -Urine indices including urine analysis, urine sodium,urine creatinine,urine osmolality, renal usg reviewed.       COPD -  duo nebs as needed. Add solumedrol     HTN - hold lisinopril and hctz. TSH in normal range     Elevated D-dimer - VQ scan low prob.   DVT prophylaxis  Discharge planning to rehab  PT/OT    Disposition : 1-2 days    Review of systems  General: No fevers or chills. Cardiovascular: No chest pain or pressure. No palpitations. Pulmonary: No shortness of breath. Gastrointestinal: No nausea, vomiting. Physical Exam:  General: Awake, cooperative, no acute distress    HEENT: NC, Atraumatic. PERRLA, anicteric sclerae. Lungs: Exp wheezes bilaterally  Heart:  Regular  rhythm,  No murmur, No Rubs, No Gallops  Abdomen: Soft, Non distended, Non tender.  +Bowel sounds,   Extremities: No c/c/e  Psych:   Not anxious or agitated. Neurologic:  No acute neurological deficit.            Vital signs/Intake and Output:  Visit Vitals    /83    Pulse (!) 115    Temp 97.7 °F (36.5 °C)    Resp 18    Ht 5' 7\" (1.702 m)    Wt 84.2 kg (185 lb 10 oz)    SpO2 96%    BMI 29.07 kg/m2     Current Shift:  10/30 0701 - 10/30 1900  In: 360 [P.O.:360]  Out: 150 [Urine:150]  Last three shifts:  10/28 1901 - 10/30 0700  In: 300 [P.O.:300]  Out: 1525 [JCXRL:2092]            Labs: Results:       Chemistry Recent Labs      10/30/17   0216  10/29/17   0110  10/28/17   0311   GLU  95  87  110*   NA  134*  134*  132*   K  3.4*  3.5  3.4*   CL  101  103  100   CO2  24  23  25   BUN  11  14  26*   CREA  1.13  1.04  1.39*   CA  7.0*  6.8*  7.3*   AGAP  9  8  7   BUCR  10*  13  19      CBC w/Diff Recent Labs      10/30/17   0216  10/29/17   0110  10/28/17   0311   WBC  4.5*  4.9  8.3   RBC  3.52*  3.67*  4.06*   HGB  10.9*  11.3*  12.4*   HCT  31.4*  32.5*  36.2   PLT  179  198  229      Cardiac Enzymes No results for input(s): CPK, CKND1, RITA in the last 72 hours. No lab exists for component: CKRMB, TROIP   Coagulation No results for input(s): PTP, INR, APTT in the last 72 hours. No lab exists for component: INREXT    Lipid Panel No results found for: CHOL, CHOLPOCT, CHOLX, CHLST, CHOLV, 720605, HDL, LDL, LDLC, DLDLP, 695577, VLDLC, VLDL, TGLX, TRIGL, TRIGP, TGLPOCT, CHHD, CHHDX   BNP No results for input(s): BNPP in the last 72 hours. Liver Enzymes No results for input(s): TP, ALB, TBIL, AP, SGOT, GPT in the last 72 hours.     No lab exists for component: DBIL   Thyroid Studies Lab Results   Component Value Date/Time    TSH 0.92 10/30/2017 02:16 AM        Procedures/imaging: see electronic medical records for all procedures/Xrays and details which were not copied into this note but were reviewed prior to creation of Plan

## 2017-10-30 NOTE — PROGRESS NOTES
Problem: Mobility Impaired (Adult and Pediatric)  Goal: *Acute Goals and Plan of Care (Insert Text)  Physical Therapy Goals LT/ST  Initiated 10/27/2017 and to be accomplished within 5-7 day(s)  1. Patient will move from supine <> sit with S/mod I in prep for out of bed activity and change of position. 2.  Patient will perform sit<> stand with S/mod I with LRAD in prep for transfers/ambulation. 3.  Patient will transfer from bed <> chair with S with LRAD for time up in chair for completion of ADL activity. 4.  Patient will ambulate 150 feet with S/mod I with LRAD for improved functional mobility/safe discharge. 5.  Patient will ascend/descend 3-5 stairs with handrail(s) with minimal assistance/contact guard assist for home re-entry as needed. Outcome: Progressing Towards Goal  physical Therapy TREATMENT    Patient: Richa Vega (59 y.o. male)  Date: 10/30/2017  Diagnosis: Syncope  Sepsis ([facility]  Syncope  Sepsis ([facility] [address] [facility]  Acute renal injury ([facility] Acute renal injury [facility])  Precautions: Fall   Chart, physical therapy assessment, plan of care and goals were reviewed. ASSESSMENT:  Pt slightly increasing ambulation distance with RW, CGA. Pt reporting B LE weakness, tightness and increase pain when ambulating. Pt required seated rest to recover. Pt continue to fatigue quickly. Pt unable to perform stair training which is needed for safe home mobility. Therefore rehab recommended. Cont POC. Progression toward goals:  []      Improving appropriately and progressing toward goals  [x]      Improving slowly and progressing toward goals  []      Not making progress toward goals and plan of care will be adjusted     PLAN:  Patient continues to benefit from skilled intervention to address the above impairments. Continue treatment per established plan of care.   Discharge Recommendations:  Rehab  Further Equipment Recommendations for Discharge:  rolling walker     SUBJECTIVE:   Patient stated  I got up earlier to the rest room     OBJECTIVE DATA SUMMARY:   Critical Behavior:  Neurologic State: Alert, Appropriate for age  Orientation Level: Oriented X4  Cognition: Appropriate decision making, Appropriate for age attention/concentration, Appropriate safety awareness, Follows commands  Safety/Judgement: Awareness of environment  Functional Mobility Training:  Bed Mobility:  Supine to Sit: Supervision  Scooting: Supervision  Transfers:  Sit to Stand: Contact guard assistance  Stand to Sit: Stand-by asssistance;Contact guard assistance  Balance:  Sitting: Intact  Standing: Intact; With support  Ambulation/Gait Training:  Distance (ft): 100 Feet (ft) (100' x2)  Assistive Device: Walker, rolling;Gait belt  Ambulation - Level of Assistance: Contact guard assistance  Gait Abnormalities: Decreased step clearance  Speed/Riri: Slow  Step Length: Right shortened;Left shortened  Interventions: Verbal cues; Safety awareness training    Pain:  Pain Scale 1: Numeric (0 - 10)  Pain Intensity 1: 0  Activity Tolerance:   Fair     After treatment:   [] Patient left in no apparent distress sitting up in chair  [x] Patient left in no apparent distress in bed(EOB)   [x] Call bell left within reach  [] Nursing notified  [x] Caregiver present  [] Bed alarm activated      Mayra Landeros PTA   Time Calculation: 17 mins

## 2017-10-30 NOTE — ROUTINE PROCESS
Bedside and Verbal shift change report given to LEIF Lutz by Author Side, RN. Report included the following information SBAR, Kardex, OR Summary, Intake/Output and MAR.

## 2017-10-30 NOTE — PROGRESS NOTES
conducted a Follow up consultation and Spiritual Assessment for Elise Art, who is a 81 y.o.,male. The  provided the following Interventions:  Continued the relationship of care and support. Discussed the Advance Medical Directive with patient, he stated he has one at home and on file in his medical record. Listened empathically. Offered prayer and assurance of continued prayer on patients behalf. Chart reviewed. The following outcomes were achieved:  Patient expressed gratitude for pastoral care visit. Assessment:  Patient stated he has supportive family and he plans on going to rehab from the Hospital.  There are no further spiritual or Confucianism issues which require Spiritual Care Services interventions at this time. Plan:  Chaplains will continue to follow and will provide pastoral care on an as needed/requested basis.  recommends bedside caregivers page  on duty if patient shows signs of acute spiritual or emotional distress.

## 2017-10-30 NOTE — PROGRESS NOTES
1005- Patient in bed this time, AM medications tolerated well. A/O x 4. Wheezing heard upon ascultation of lungs, encouraged deep breathing and coughing, respiratory called for treatment. abdomen soft and non-distended. Bowel sounds active, 20G IV to right AC, infusing without difficulty. No signs of phlebitis or infiltration noted. Skin warm ,dry and intact. Patient denies pain or discomfort. Bed placed in lowest position, call bell within reach.

## 2017-10-30 NOTE — PROGRESS NOTES
Chart reviewed, noted MD indicating pt ready for discharge \"1-2 day\". Pt has referrals out to [facility], the [facility], [facility] has messaged to follow up if able to accept pt for services. Noted pt lives alone, has DME at home. CM following to finalize discharge plan. Plan: pt has been accepted to the [address] [place] [address] for admission Tuesday 10/31 if medically stable. RN please call report to [phone] and Please include all hard scripts for controlled substances, med rec and dc summary in packet. Please medicate for pain prior to dc if possible and needed to help offset delay when patient first arrives to facility. Pt requesting medical transport, is aware he may incur cost for transport. Care Management Interventions  PCP Verified by CM:  Yes  Transition of Care Consult (CM Consult): SNF  Partner SNF: Yes  Physical Therapy Consult: Yes  Occupational Therapy Consult: Yes  Current Support Network: Lives Alone  Confirm Follow Up Transport: Family  Plan discussed with Pt/Family/Caregiver: Yes  Freedom of Choice Offered: Yes  Discharge Location  Discharge Placement: Skilled nursing facility

## 2017-10-30 NOTE — ROUTINE PROCESS
Bedside and Verbal shift change report given to Keyshawn Lynch RN by Joanette Opitz, RN.  Report included the following information SBAR, Kardex, OR Summary, Intake/Output and MAR

## 2017-10-30 NOTE — PROGRESS NOTES
0800 Assumed patient care from off going nurse Michelle Salomon RN. Patient is alert x 4 and appears to be in no sign of distress. Bed left in lowest position with call bell left within reach.

## 2017-10-31 VITALS
OXYGEN SATURATION: 97 % | DIASTOLIC BLOOD PRESSURE: 71 MMHG | TEMPERATURE: 98.7 F | SYSTOLIC BLOOD PRESSURE: 128 MMHG | WEIGHT: 185.63 LBS | RESPIRATION RATE: 16 BRPM | HEIGHT: 67 IN | HEART RATE: 99 BPM | BODY MASS INDEX: 29.13 KG/M2

## 2017-10-31 PROBLEM — N17.9 ACUTE RENAL INJURY (HCC): Status: RESOLVED | Noted: 2017-10-27 | Resolved: 2017-10-31

## 2017-10-31 PROBLEM — E86.0 DEHYDRATION: Status: RESOLVED | Noted: 2017-10-27 | Resolved: 2017-10-31

## 2017-10-31 LAB — GLUCOSE BLD STRIP.AUTO-MCNC: 144 MG/DL (ref 70–110)

## 2017-10-31 PROCEDURE — 74011250637 HC RX REV CODE- 250/637: Performed by: HOSPITALIST

## 2017-10-31 PROCEDURE — 74011250636 HC RX REV CODE- 250/636: Performed by: HOSPITALIST

## 2017-10-31 PROCEDURE — 82962 GLUCOSE BLOOD TEST: CPT

## 2017-10-31 PROCEDURE — 74011250637 HC RX REV CODE- 250/637: Performed by: INTERNAL MEDICINE

## 2017-10-31 PROCEDURE — 94640 AIRWAY INHALATION TREATMENT: CPT

## 2017-10-31 PROCEDURE — 94760 N-INVAS EAR/PLS OXIMETRY 1: CPT

## 2017-10-31 PROCEDURE — 74011000250 HC RX REV CODE- 250: Performed by: INTERNAL MEDICINE

## 2017-10-31 RX ORDER — POTASSIUM CHLORIDE 20 MEQ/1
40 TABLET, EXTENDED RELEASE ORAL
Status: COMPLETED | OUTPATIENT
Start: 2017-10-31 | End: 2017-10-31

## 2017-10-31 RX ORDER — LEVOFLOXACIN 750 MG/1
750 TABLET ORAL EVERY 24 HOURS
Qty: 5 TAB | Refills: 0 | Status: SHIPPED | OUTPATIENT
Start: 2017-10-31 | End: 2017-11-05

## 2017-10-31 RX ORDER — PRIMIDONE 50 MG/1
50 TABLET ORAL 2 TIMES DAILY
COMMUNITY
End: 2021-06-27

## 2017-10-31 RX ORDER — LEVOFLOXACIN 5 MG/ML
500 INJECTION, SOLUTION INTRAVENOUS EVERY 24 HOURS
Status: DISCONTINUED | OUTPATIENT
Start: 2017-10-31 | End: 2017-10-31

## 2017-10-31 RX ORDER — LEVOFLOXACIN 750 MG/1
750 TABLET ORAL EVERY 24 HOURS
Status: DISCONTINUED | OUTPATIENT
Start: 2017-10-31 | End: 2017-10-31 | Stop reason: HOSPADM

## 2017-10-31 RX ORDER — IPRATROPIUM BROMIDE AND ALBUTEROL SULFATE 2.5; .5 MG/3ML; MG/3ML
3 SOLUTION RESPIRATORY (INHALATION)
Qty: 30 NEBULE | Refills: 0 | Status: SHIPPED | OUTPATIENT
Start: 2017-10-31

## 2017-10-31 RX ORDER — PRIMIDONE 50 MG/1
50 TABLET ORAL 2 TIMES DAILY
Status: DISCONTINUED | OUTPATIENT
Start: 2017-10-31 | End: 2017-10-31 | Stop reason: HOSPADM

## 2017-10-31 RX ORDER — PREDNISONE 10 MG/1
TABLET ORAL
Qty: 30 TAB | Refills: 0 | Status: SHIPPED | OUTPATIENT
Start: 2017-10-31 | End: 2021-06-27

## 2017-10-31 RX ADMIN — IPRATROPIUM BROMIDE AND ALBUTEROL SULFATE 3 ML: .5; 3 SOLUTION RESPIRATORY (INHALATION) at 07:21

## 2017-10-31 RX ADMIN — LEVOFLOXACIN 750 MG: 750 TABLET, FILM COATED ORAL at 14:16

## 2017-10-31 RX ADMIN — PRIMIDONE 50 MG: 50 TABLET ORAL at 14:16

## 2017-10-31 RX ADMIN — POLYETHYLENE GLYCOL 3350 17 G: 17 POWDER, FOR SOLUTION ORAL at 09:47

## 2017-10-31 RX ADMIN — OMEPRAZOLE 20 MG: 20 CAPSULE, DELAYED RELEASE ORAL at 09:46

## 2017-10-31 RX ADMIN — ASPIRIN 81 MG 81 MG: 81 TABLET ORAL at 09:46

## 2017-10-31 RX ADMIN — POTASSIUM CHLORIDE 40 MEQ: 20 TABLET, EXTENDED RELEASE ORAL at 12:48

## 2017-10-31 RX ADMIN — SERTRALINE HYDROCHLORIDE 100 MG: 50 TABLET ORAL at 09:46

## 2017-10-31 RX ADMIN — FLUTICASONE FUROATE AND VILANTEROL TRIFENATATE 1 PUFF: 100; 25 POWDER RESPIRATORY (INHALATION) at 09:46

## 2017-10-31 RX ADMIN — HEPARIN SODIUM 5000 UNITS: 5000 INJECTION, SOLUTION INTRAVENOUS; SUBCUTANEOUS at 00:52

## 2017-10-31 RX ADMIN — IPRATROPIUM BROMIDE AND ALBUTEROL SULFATE 3 ML: .5; 3 SOLUTION RESPIRATORY (INHALATION) at 12:26

## 2017-10-31 RX ADMIN — LORATADINE 10 MG: 10 TABLET ORAL at 09:46

## 2017-10-31 RX ADMIN — METHYLPREDNISOLONE SODIUM SUCCINATE 40 MG: 40 INJECTION, POWDER, FOR SOLUTION INTRAMUSCULAR; INTRAVENOUS at 09:46

## 2017-10-31 RX ADMIN — HEPARIN SODIUM 5000 UNITS: 5000 INJECTION, SOLUTION INTRAVENOUS; SUBCUTANEOUS at 09:46

## 2017-10-31 NOTE — PROGRESS NOTES
Problem: Falls - Risk of  Goal: *Absence of Falls  Document Christiano Fall Risk and appropriate interventions in the flowsheet.    Outcome: Progressing Towards Goal  Fall Risk Interventions:  Mobility Interventions: Patient to call before getting OOB, PT Consult for mobility concerns, PT Consult for assist device competence, Utilize walker, cane, or other assitive device         Medication Interventions: Patient to call before getting OOB, Teach patient to arise slowly    Elimination Interventions: Call light in reach, Patient to call for help with toileting needs, Toilet paper/wipes in reach, Toileting schedule/hourly rounds, Urinal in reach    History of Falls Interventions: Consult care management for discharge planning, Door open when patient unattended, Investigate reason for fall, Room close to nurse's station

## 2017-10-31 NOTE — PROGRESS NOTES
0770 Assumed care of pt from [facility]. Pt resting quietly in bed , no signs of distress, call bell within reach. 36 Spoke with  regarding pt CXR, and if it would be beneficial to have pt be empirically treated with abx for poss pna. MD will look at xray, no orders received     [phone] with 's office and verified that pt takes Primidone 50 mg 1 tablet by mouth twice daily.    -pt states that he has been taking this per Franky Han, but was unsure of what the medication was

## 2017-10-31 NOTE — PROGRESS NOTES
8978 Assumed care of pt from [facility]. Pt resting quietly in bed , no signs of distress, call bell within reach. 36 Spoke with  regarding pt CXR, and if it would be beneficial to have pt be empirically treated with abx for poss pna. MD will look at xray, no orders received     [phone] with Trent Tucker LPN at [facility], telephone report given. 916 Lisa Stovall with 's office and verified that pt takes Primidone 50 mg 1 tablet by mouth twice daily. -pt states that he has been taking this per Joel Garcia, but was unsure of what the medication was    Shift Summary- Shift uneventful. Pt rested throughout shift. Denied chest pain or shortness of breath.

## 2017-10-31 NOTE — PROGRESS NOTES
D/c Rehab today  Met with patient and   at bedside. Patient is ready for discharge to Rehab. Telephone call with Darryle Beach she informed cm she could accommodate. patient today Informed Patient that [facility] will accommodate him today patient is in agreement to this. States he will need transportation to [facility]. Informed him that he may incur cost for this. Verbally understands    RN please call report to [facility] [phone]. Nettie Robin  made transportation as requested for 1400 today as to above address    RN please send patient to [address] [facility]. Please include all hard scripts for controlled substances, med rec and dc summary in packet. Please medicate for pain prior to dc if possible and needed to help offset delay when patient first arrives to facility. Care Management Interventions  PCP Verified by CM:  Yes  Mode of Transport at Discharge: [address]  [address] Time of Discharge: 1400  Transition of Care Consult (CM Consult): SNF  Partner SNF: Yes  Physical Therapy Consult: Yes  Occupational Therapy Consult: Yes  Current Support Network: Lives Alone  Confirm Follow Up Transport: Other (see comment) (patient informed cm he will need transportation)  Plan discussed with Pt/Family/Caregiver: Yes  Freedom of Choice Offered: Yes  Discharge Location  Discharge Placement: Skilled nursing facility

## 2017-10-31 NOTE — DISCHARGE SUMMARY
Discharge Summary    Patient: Haig Hammans MRN: [id number]  CSN: [id number]    YOB: 1945  Age: 81 y.o. Sex: male    DOA: 10/26/2017 LOS:  LOS: 4 days   Discharge Date:      Primary Care Provider:  Beth Rodas MD    Admission Diagnoses: Syncope  Sepsis [facility])  Syncope  Sepsis ([facility]  Acute renal injury [facility])    Discharge Diagnoses:    Problem List as of 10/31/2017  Never Reviewed          Codes Class Noted - Resolved    Parkinson's disease ([facility] ICD-10-CM: G20  ICD-9-CM: 332.0  Unknown - Present        Syncope ICD-10-CM: R55  ICD-9-CM: 780.2  10/27/2017 - Present        Hypertension ICD-10-CM: I10  ICD-9-CM: 401.9  Unknown - Present        Chronic obstructive pulmonary disease ([facility] ICD-10-CM: J44.9  ICD-9-CM: 827  Unknown - Present        * (Principal)RESOLVED: Acute renal injury ([facility] ICD-10-CM: N17.9  ICD-9-CM: 584.9  10/27/2017 - 10/31/2017        RESOLVED: Dehydration ICD-10-CM: E86.0  ICD-9-CM: 276.51  10/27/2017 - 10/31/2017              Discharge Medications:     Current Discharge Medication List      START taking these medications    Details   albuterol-ipratropium (DUO-NEB) 2.5 mg-0.5 mg/3 ml nebu 3 mL by Nebulization route every four (4) hours as needed. Qty: 30 Nebule, Refills: 0      levoFLOXacin (LEVAQUIN) 750 mg tablet Take 1 Tab by mouth every twenty-four (24) hours for 5 days. Qty: 5 Tab, Refills: 0      predniSONE (DELTASONE) 10 mg tablet Prednisone 10mg tabs: p.o.  4 tabs daily for 3 days then drop to   3 tabs daily for 3 days then drop to   2 tabs daily for 3 days then drop to   1 tab daily for 3 days then stop. Dispense 30 tabs  Qty: 30 Tab, Refills: 0         CONTINUE these medications which have NOT CHANGED    Details   primidone (MYSOLINE) 50 mg tablet Take 50 mg by mouth two (2) times a day. Indications: ESSENTIAL TREMOR      aspirin 81 mg chewable tablet Take 81 mg by mouth daily. pravastatin (PRAVACHOL) 40 mg tablet Take 40 mg by mouth nightly. albuterol (PROVENTIL HFA, VENTOLIN HFA, PROAIR HFA) 90 mcg/actuation inhaler Take  by inhalation. omeprazole (PRILOSEC) 20 mg capsule Take 1 capsule by mouth daily. Qty: 20 capsule, Refills: 0      sertraline (ZOLOFT) 100 mg tablet Take 100 mg by mouth daily. tiotropium (SPIRIVA WITH HANDIHALER) 18 mcg inhalation capsule Take 1 capsule by inhalation daily. STOP taking these medications       lisinopril-hydrochlorothiazide (PRINZIDE, ZESTORETIC) 10-12.5 mg per tablet Comments:   Reason for Stopping:         ondansetron (ZOFRAN ODT) 8 mg disintegrating tablet Comments:   Reason for Stopping:         hydrocodone-acetaminophen (NORCO) 7.5-325 mg per tablet Comments:   Reason for Stopping:               Discharge Condition: Good              PHYSICAL EXAM   Visit Vitals    /71 (BP 1 Location: Left arm, BP Patient Position: At rest;Supine; Head of bed elevated (Comment degrees))    Pulse 99    Temp 98.7 °F (37.1 °C)    Resp 16    Ht 5' 7\" (1.702 m)    Wt 84.2 kg (185 lb 10 oz)    SpO2 97%    BMI 29.07 kg/m2     General: Awake, cooperative, no acute distress    HEENT: NC, Atraumatic. PERRLA, EOMI. Anicteric sclerae. Lungs:  CTA Bilaterally. No Wheezing/Rhonchi/Rales. Heart:  Regular  rhythm,  No murmur, No Rubs, No Gallops  Abdomen: Soft, Non distended, Non tender. +Bowel sounds,   Extremities: No c/c/e  Psych:   Not anxious or agitated. Neurologic:  No acute neurological deficits. Admission HPI : Victoria Hickey is a 81 y.o. male who has past history of COPD, HTN, stroke presents to ER with concerns of syncopal episode. Patient is poor historian. He reports that last night he got out of shower and he passed out. He was not able get up. EMS was called and patient brought to ER. Patient reports that he has been feeling weak, fatigue and not being well for about a month. Over the past few days he has been feeling shaking/chills.  He denies any chest pain, SOB, N/V. He lives alone. Denies smoking or alcohol use. In ER his temp at 96.1, his BUN/Cr 34/2.21, cardiac enzymes negative. D-Dimer at 1.31, lactic acid at 3. UA with no evidence of UTI, CXR with no acute findings. Hospital Course :   Syncopal episode - likely secondary to orthostatic hypotension from dehydration. No further episodes.       YA - pre renal, intravascular volume depletion. Improved with IVF,  BUN/Cr today at 11/1.13  UA with no evidence of UTI.  renal US with no obstruction. COPD - started on solumedrol, neb treatment as needed. Will discharge on prednisone taper dose. CXR with possible pneumonia, wbc in normal range, no fever. Will give levaquin for 5 day course. There was also concern for PE secondary to elevated D-dimer, however is VQ scan with low probability of PE.        HTN - held lisinopril and hctz. His BP in normal range. Will stop these medications. He worked with PT/Ot and rehab recommended         Activity: Activity as tolerated    Diet: Regular Diet    Follow-up: PCP    Disposition: Rehab    Minutes spent on discharge: 45       Labs: Results:       Chemistry Recent Labs      10/30/17   0216  10/29/17   0110   GLU  95  87   NA  134*  134*   K  3.4*  3.5   CL  101  103   CO2  24  23   BUN  11  14   CREA  1.13  1.04   CA  7.0*  6.8*   AGAP  9  8   BUCR  10*  13      CBC w/Diff Recent Labs      10/30/17   0216  10/29/17   0110   WBC  4.5*  4.9   RBC  3.52*  3.67*   HGB  10.9*  11.3*   HCT  31.4*  32.5*   PLT  179  198      Cardiac Enzymes No results for input(s): CPK, CKND1, RITA in the last 72 hours. No lab exists for component: CKRMB, TROIP   Coagulation No results for input(s): PTP, INR, APTT in the last 72 hours.     No lab exists for component: INREXT, INREXT    Lipid Panel No results found for: CHOL, CHOLPOCT, CHOLX, CHLST, CHOLV, 088877, HDL, LDL, LDLC, DLDLP, 811008, VLDLC, VLDL, TGLX, TRIGL, TRIGP, TGLPOCT, CHHD, CHHDX   BNP No results for input(s): BNPP in the last 72 hours. Liver Enzymes No results for input(s): TP, ALB, TBIL, AP, SGOT, GPT in the last 72 hours. No lab exists for component: DBIL   Thyroid Studies Lab Results   Component Value Date/Time    TSH 0.92 10/30/2017 02:16 AM            Significant Diagnostic Studies: Ct Head Wo Cont    Result Date: 10/27/2017  EXAM: CT head INDICATION: Syncope. COMPARISON: None. TECHNIQUE: Axial CT imaging of the head was performed without intravenous contrast. Dose reduction techniques used: automated exposure control, adjustment of the mAs and/or kVp according to patient size, and iterative reconstruction techniques. _______________ FINDINGS: BRAIN AND POSTERIOR FOSSA: The sulci, folia, ventricles and basal cisterns are within normal limits for the patient's age. There is no intracranial hemorrhage, mass effect, or midline shift. There are no areas of abnormal parenchymal attenuation. EXTRA-AXIAL SPACES AND MENINGES: There are no abnormal extra-axial fluid collections. CALVARIUM: Intact. SINUSES: Clear. OTHER: None. _______________     IMPRESSION: No acute intracranial abnormalities. Us Retroperitoneum Comp    Result Date: 10/27/2017  Retroperitoneal Ultrasound History: 68-year-old patient with acute kidney injury. Comparison: No prior study available for comparison. Technique: Multiple gray scale sonographic images of the kidneys and bladder were obtained. Additional color Doppler and the Doppler waveform images were obtained . Findings: The right kidney measures 8.2 cm in length and is normal in echotexture. No focal lesions are seen. There is no evidence of hydronephrosis. The left kidney measures 9.2 cm in length and is normal in echotexture. No focal lesions are seen. There is no evidence of hydronephrosis. The bladder is unremarkable. The estimated volume of the bladder pre-void is 428 mL. The patient was able to void. Ureteric jets not visualized.      IMPRESSION: No hydronephrosis involving either kidney. Patient was unable to void for the purposes of calculating postvoid residual.    Xr Chest Port    Result Date: 10/30/2017  EXAM:Chest X-Ray  History: Shortness of breath Technique:  Portable Frontal View Comparison: 10/26/2017, 10/05/2014 _______________ FINDINGS: The trachea is midline. The cardiomediastinal silhouette is midline and, within normal limits. Pulmonary hyperinflation with minimal patchy lateral left basilar parenchymal opacity. Overall preserved bilateral diaphragmatic margins and bilateral costophrenic angles. Intact osseous structures. _______________     IMPRESSION: 1. Minimal patchy lateral left basilar parenchymal opacity with underlying hyperinflation, representing atelectasis/potential pneumonia. Xr Chest Port    Result Date: 10/27/2017  PORTABLE CHEST AT 2255 HOURS: Indication: Syncope. Technique:  Portable, erect AP view. Comparison:  PA view 10/05/2014 Findings:  Lungs are well expanded. No focal consolidation, pulmonary edema or pneumothorax. Cardiac silhouette is within normal limits. Atherosclerotic thoracic aorta. Degenerative change around the visualized shoulders. IMPRESSION: 1. No acute cardiopulmonary disease. Stable exam.    Nm Lung Perfusion W Vent    Result Date: 10/27/2017  EXAM:  Nuclear medicine pulmonary ventilation perfusion study. COMPARISON: No prior exam of the same type is available for direct comparison. PROVIDED REASON FOR EXAM: \"syncope\". RADIOPHARMACEUTICAL:  1 mCi of technetium 99m DTPA inhaled and 7.1 mCi of technetium 99m MAA intravenous. _______________ FINDINGS: Ventilation and perfusion images are mildly heterogeneous without peripheral mismatched defects. Previous day chest radiography is negative for consolidative abnormality. _______________     IMPRESSION: Low probability for pulmonary embolism. _______________         No results found for this or any previous visit.         CC: Day Kim MD

## 2017-10-31 NOTE — ROUTINE PROCESS
Bedside and Verbal shift change report given to Jade Musa RN (oncoming nurse) by Aayush Dickson RN (offgoing nurse). Report included the following information SBAR, Kardex and MAR. Patient had no acute events overnight. Currently resting in NAD. No express needs reported overnight.

## 2017-11-02 LAB
BACTERIA SPEC CULT: NORMAL
SERVICE CMNT-IMP: NORMAL

## 2017-11-11 ENCOUNTER — HOSPITAL ENCOUNTER (EMERGENCY)
Age: 72
Discharge: HOME | End: 2017-11-12
Attending: EMERGENCY MEDICINE
Payer: MEDICARE

## 2017-11-11 DIAGNOSIS — R60.9 DEPENDENT EDEMA: ICD-10-CM

## 2017-11-11 DIAGNOSIS — L30.9 DERMATITIS: Primary | ICD-10-CM

## 2017-11-11 LAB
ANION GAP SERPL CALC-SCNC: 6 MMOL/L (ref 3–18)
APTT PPP: 23.4 SEC (ref 23–36.4)
BASOPHILS # BLD: 0 K/UL (ref 0–0.06)
BASOPHILS NFR BLD: 0 % (ref 0–2)
BUN SERPL-MCNC: 18 MG/DL (ref 7–18)
BUN/CREAT SERPL: 14 (ref 12–20)
CALCIUM SERPL-MCNC: 8.6 MG/DL (ref 8.5–10.1)
CHLORIDE SERPL-SCNC: 100 MMOL/L (ref 100–108)
CO2 SERPL-SCNC: 30 MMOL/L (ref 21–32)
CREAT SERPL-MCNC: 1.33 MG/DL (ref 0.6–1.3)
DIFFERENTIAL METHOD BLD: ABNORMAL
EOSINOPHIL # BLD: 0.2 K/UL (ref 0–0.4)
EOSINOPHIL NFR BLD: 2 % (ref 0–5)
ERYTHROCYTE [DISTWIDTH] IN BLOOD BY AUTOMATED COUNT: 14.9 % (ref 11.6–14.5)
GLUCOSE SERPL-MCNC: 89 MG/DL (ref 74–99)
HCT VFR BLD AUTO: 34.9 % (ref 36–48)
HGB BLD-MCNC: 11.6 G/DL (ref 13–16)
INR PPP: 1 (ref 0.8–1.2)
LYMPHOCYTES # BLD: 2.5 K/UL (ref 0.9–3.6)
LYMPHOCYTES NFR BLD: 23 % (ref 21–52)
MCH RBC QN AUTO: 30.2 PG (ref 24–34)
MCHC RBC AUTO-ENTMCNC: 33.2 G/DL (ref 31–37)
MCV RBC AUTO: 90.9 FL (ref 74–97)
MONOCYTES # BLD: 1.1 K/UL (ref 0.05–1.2)
MONOCYTES NFR BLD: 10 % (ref 3–10)
NEUTS SEG # BLD: 7.1 K/UL (ref 1.8–8)
NEUTS SEG NFR BLD: 65 % (ref 40–73)
PLATELET # BLD AUTO: 329 K/UL (ref 135–420)
PMV BLD AUTO: 10.3 FL (ref 9.2–11.8)
POTASSIUM SERPL-SCNC: 4 MMOL/L (ref 3.5–5.5)
PROTHROMBIN TIME: 12.7 SEC (ref 11.5–15.2)
RBC # BLD AUTO: 3.84 M/UL (ref 4.7–5.5)
SODIUM SERPL-SCNC: 136 MMOL/L (ref 136–145)
WBC # BLD AUTO: 11 K/UL (ref 4.6–13.2)

## 2017-11-11 PROCEDURE — 80048 BASIC METABOLIC PNL TOTAL CA: CPT | Performed by: PHYSICIAN ASSISTANT

## 2017-11-11 PROCEDURE — 85730 THROMBOPLASTIN TIME PARTIAL: CPT | Performed by: PHYSICIAN ASSISTANT

## 2017-11-11 PROCEDURE — 85025 COMPLETE CBC W/AUTO DIFF WBC: CPT | Performed by: PHYSICIAN ASSISTANT

## 2017-11-11 PROCEDURE — 85610 PROTHROMBIN TIME: CPT | Performed by: PHYSICIAN ASSISTANT

## 2017-11-11 PROCEDURE — 99283 EMERGENCY DEPT VISIT LOW MDM: CPT

## 2017-11-12 VITALS
WEIGHT: 190 LBS | HEART RATE: 82 BPM | RESPIRATION RATE: 16 BRPM | BODY MASS INDEX: 29.82 KG/M2 | OXYGEN SATURATION: 100 % | DIASTOLIC BLOOD PRESSURE: 71 MMHG | SYSTOLIC BLOOD PRESSURE: 142 MMHG | HEIGHT: 67 IN | TEMPERATURE: 97.7 F

## 2017-11-12 PROCEDURE — 93970 EXTREMITY STUDY: CPT

## 2017-11-12 RX ORDER — CEPHALEXIN 500 MG/1
500 CAPSULE ORAL 3 TIMES DAILY
Qty: 21 CAP | Refills: 0 | Status: SHIPPED | OUTPATIENT
Start: 2017-11-12 | End: 2017-11-19

## 2017-11-12 NOTE — ED NOTES
Pt discharged home stable and ambulatory. Pain level at discharge 0. Pt discharged with family. Reviewed discharged instructions with patient who verbalized understanding.   Patient armband removed and shredded

## 2017-11-12 NOTE — ED TRIAGE NOTES
States both legs swollen, red, itchy and hot x 2 hours. Was admitted here recently for Pneumonia  Sepsis Screening completed    (  )Patient meets SIRS criteria. (  xx)Patient does not meet SIRS criteria.       SIRS Criteria is achieved when two or more of the following are present   Temperature < 96.8°F (36°C) or > 100.9°F (38.3°C)   Heart Rate > 90 beats per minute   Respiratory Rate > 20 breaths per minute   WBC count > 12,000 or <4,000 or > 10% bands

## 2017-11-12 NOTE — PROCEDURES
[facility]  *** FINAL REPORT ***    Name: Svetlana Granado  MRN: [id number]    Outpatient  : 1945  HIS Order #: [id number]  [address] [facility] Visit #: [id number]  Date: 2017    TYPE OF TEST: Peripheral Venous Testing    REASON FOR TEST  Limb swelling    Right Leg:-  Deep venous thrombosis:           No  Superficial venous thrombosis:    No  Deep venous insufficiency:        Yes  Superficial venous insufficiency: Not examined    Left Leg:-  Deep venous thrombosis:           No  Superficial venous thrombosis:    Not examined  Deep venous insufficiency:        Yes  Superficial venous insufficiency: Not examined      INTERPRETATION/FINDINGS  Duplex images were obtained using 2-D gray scale, color flow, and  spectral Doppler analysis. Right leg :  1. Deep vein(s) visualized include the common femoral, deep femoral,  proximal femoral, mid femoral, distal femoral, popliteal(above knee),  popliteal(fossa), popliteal(below knee), posterior tibial and peroneal   veins. 2. No evidence of any acute deep venous thrombosis detected in the  veins visualized. 3. Superficial vein(s) visualized include the great saphenous vein. 4. No evidence of superficial thrombosis detected. 5. Deep venous reflux noted in the femoral, popliteal and calf veins. Left leg :  1. Deep vein(s) visualized include the common femoral, deep femoral,  proximal femoral, mid femoral, distal femoral, popliteal(above knee),  popliteal(fossa), popliteal(below knee), posterior tibial and peroneal   veins. 2. No evidence of any acute deep venous thrombosis detected in the  veins visualized. 3. Deep venous reflux noted in the femoral, popliteal and calf veins    ADDITIONAL COMMENTS  Bilateral proximal femoral and popliteal vein shows evidence of  chronic scarring (remnants of DVT)    I have personally reviewed the data relevant to the interpretation of  this  study.     TECHNOLOGIST: Evert Espinosa  Signed: 2017 12:39 AM    PHYSICIAN: Tam Díaz.  Amilcar Galvan MD  Signed: 11/13/2017 03:23 PM

## 2017-11-12 NOTE — DISCHARGE INSTRUCTIONS
Dermatitis: Care Instructions  Your Care Instructions  Dermatitis is the general name used for any rash or inflammation of the skin. Different kinds of dermatitis cause different kinds of rashes. Common causes of a rash include new medicines, plants (such as poison oak or poison ivy), heat, and stress. Certain illnesses can also cause a rash. An allergic reaction to something that touches your skin, such as latex, nickel, or poison ivy, is called contact dermatitis. Contact dermatitis may also be caused by something that irritates the skin, such as bleach, a chemical, or soap. These types of rashes cannot be spread from person to person. How long your rash will last depends on what caused it. Rashes may last a few days or months. Follow-up care is a key part of your treatment and safety. Be sure to make and go to all appointments, and call your doctor if you are having problems. It's also a good idea to know your test results and keep a list of the medicines you take. How can you care for yourself at home? · Do not scratch the rash. Cut your nails short, and file them smooth. Or wear gloves if this helps keep you from scratching. · Wash the area with water only. Pat dry. · Put cold, wet cloths on the rash to reduce itching. · Keep cool, and stay out of the sun. · Leave the rash open to the air as much as possible. · If the rash itches, use hydrocortisone cream. Follow the directions on the label. Calamine lotion may help for plant rashes. · Take an over-the-counter antihistamine, such as diphenhydramine (Benadryl) or loratadine (Claritin), to help calm the itching. Read and follow all instructions on the label. · If your doctor prescribed a cream, use it as directed. If your doctor prescribed medicine, take it exactly as directed. When should you call for help?   Call your doctor now or seek immediate medical care if:  ? · You have symptoms of infection, such as:  ¨ Increased pain, swelling, warmth, or redness. ¨ Red streaks leading from the area. ¨ Pus draining from the area. ¨ A fever. ? · You have joint pain along with the rash. ? Watch closely for changes in your health, and be sure to contact your doctor if:  ? · Your rash is changing or getting worse. ? · You are not getting better as expected. Where can you learn more? Go to http://latonya-cesar.info/. Enter ([phone] in the search box to learn more about \"Dermatitis: Care Instructions. \"  Current as of: October 13, 2016  Content Version: 11.4  © 1453-7984 [employer]. Care instructions adapted under license by [employer] (which disclaims liability or warranty for this information). If you have questions about a medical condition or this instruction, always ask your healthcare professional. [address] any warranty or liability for your use of this information.

## 2017-11-12 NOTE — ED PROVIDER NOTES
[address]  EMERGENCY DEPARTMENT HISTORY AND PHYSICAL EXAM       Date: 11/11/2017   Patient Name: Landon Valiente   YOB: 1945  Medical Record Number: [id number]    History of Presenting Illness     Chief Complaint   Patient presents with    Leg Pain        History Provided By:  Patient and grandson     Additional History:11:10 PM   Landon Valiente is a 81 y.o. male Hx COPD, CVA, HTN, presenting to the ED C/O constant, gradually worsening erythematous itchy but not painful skin rash located to the left ankle onset 3 hours ago. The skin rash is associated with subjective swelling of the ankle/ calf. Pt. States that he has bathed multiple times today, without relief to the rash. The patient has a hx of swollen legs, but not with the noted rash. The patient does not endorse any new lotions, soaps, socks, or anything else new that may be causing an allergic reaction. The patient had eaten 2 eggs, finney, potatos, and coffee this morning for breakfast; has not eaten since. The patient says he has walked more than normal today. Pt denies fever, chills, shortness of breath, chest pain, and any other symptoms or complaints. Written by ELLEN Anna, as dictated by [employer], PA-C       Primary Care Provider: Leon Sher MD   Specialist:    Past History     Past Medical History:   Past Medical History:   Diagnosis Date    Chronic obstructive pulmonary disease ([facility]     Hypertension     Parkinson's disease ([facility]     Stroke [facility])         Past Surgical History:   Past Surgical History:   Procedure Laterality Date    HX ORTHOPAEDIC      left leg surgery        Family History:   History reviewed. No pertinent family history.      Social History:   Social History   Substance Use Topics    Smoking status: Former Smoker    Smokeless tobacco: Never Used    Alcohol use None        Allergies:   No Known Allergies     Review of Systems   Review of Systems Constitutional: Negative for chills and fever. Respiratory: Negative for shortness of breath. Cardiovascular: Negative for chest pain. Musculoskeletal: Positive for joint swelling (subjective swelling of the left ankle/ calf). Skin: Positive for rash (left ankle/ calf). All other systems reviewed and are negative. Physical Exam  Vitals:    11/11/17 2302   BP: 148/65   Pulse: 96   Resp: 18   Temp: 97.7 °F (36.5 °C)   SpO2: 100%   Weight: 86.2 kg (190 lb)   Height: 5' 6.5\" (1.689 m)       Physical Exam   Nursing note and vitals reviewed. Vital signs and nursing notes reviewed. CONSTITUTIONAL: Alert. Well-appearing; well-nourished; in no apparent distress. CV: Normal S1, S2; no murmurs, rubs, or gallops. No chest wall tenderness. RESPIRATORY: Mild diffuse expiratory wheezes, no rhonchi, and no rales. GI: Normal bowel sounds; distended; non-tender; no guarding or rigidity; no palpable organomegaly. No CVA tenderness. BACK:  No evidence of trauma or deformity. Non-tender to palpation. FROM without difficulty. Negative straight leg raise bilaterally. EXT: Trace pitting edema bilateral ankles and feet. R calf non-tender without rash or erythema. LL leg well healed surgical scars with a chronic brawny discoloration to distal lower leg. Dry erythematous skin to lateral aspect lower leg, ankle, and foot + pruritic and mild tenderness left calf. Sensation intact 2+ DP and PT pulses. Knees non-tender. SKIN: Normal for age and race; warm; dry; good turgor; no apparent lesions or exudate. NEURO: A & O x3. PSYCH:  Mood and affect appropriate.       Diagnostic Study Results     Labs -      Recent Results (from the past 12 hour(s))   CBC WITH AUTOMATED DIFF    Collection Time: 11/11/17 11:30 PM   Result Value Ref Range    WBC 11.0 4.6 - 13.2 K/uL    RBC 3.84 (L) 4.70 - 5.50 M/uL    HGB 11.6 (L) 13.0 - 16.0 g/dL    HCT 34.9 (L) 36.0 - 48.0 %    MCV 90.9 74.0 - 97.0 FL    MCH 30.2 24.0 - 34.0 PG    MCHC 33.2 31.0 - 37.0 g/dL    RDW 14.9 (H) 11.6 - 14.5 %    PLATELET 360 742 - 135 K/uL    MPV 10.3 9.2 - 11.8 FL    NEUTROPHILS 65 40 - 73 %    LYMPHOCYTES 23 21 - 52 %    MONOCYTES 10 3 - 10 %    EOSINOPHILS 2 0 - 5 %    BASOPHILS 0 0 - 2 %    ABS. NEUTROPHILS 7.1 1.8 - 8.0 K/UL    ABS. LYMPHOCYTES 2.5 0.9 - 3.6 K/UL    ABS. MONOCYTES 1.1 0.05 - 1.2 K/UL    ABS. EOSINOPHILS 0.2 0.0 - 0.4 K/UL    ABS. BASOPHILS 0.0 0.0 - 0.06 K/UL    DF AUTOMATED     METABOLIC PANEL, BASIC    Collection Time: 11/11/17 11:30 PM   Result Value Ref Range    Sodium 136 136 - 145 mmol/L    Potassium 4.0 3.5 - 5.5 mmol/L    Chloride 100 100 - 108 mmol/L    CO2 30 21 - 32 mmol/L    Anion gap 6 3.0 - 18 mmol/L    Glucose 89 74 - 99 mg/dL    BUN 18 7.0 - 18 MG/DL    Creatinine 1.33 (H) 0.6 - 1.3 MG/DL    BUN/Creatinine ratio 14 12 - 20      GFR est AA >60 >60 ml/min/1.73m2    GFR est non-AA 53 (L) >60 ml/min/1.73m2    Calcium 8.6 8.5 - 10.1 MG/DL   PROTHROMBIN TIME + INR    Collection Time: 11/11/17 11:30 PM   Result Value Ref Range    Prothrombin time 12.7 11.5 - 15.2 sec    INR 1.0 0.8 - 1.2     PTT    Collection Time: 11/11/17 11:30 PM   Result Value Ref Range    aPTT 23.4 23.0 - 36.4 SEC       Radiologic Studies -  The following have been ordered and reviewed:  DUPLEX LOWER EXT VENOUS BILAT   INTERPRETATION/FINDINGS  Duplex images were obtained using 2-D gray scale, color flow, and  spectral Doppler analysis. Right leg :  1. Deep vein(s) visualized include the common femoral, deep femoral,  proximal femoral, mid femoral, distal femoral, popliteal(above knee),  popliteal(fossa), popliteal(below knee), posterior tibial and peroneal   veins. 2. No evidence of any acute deep venous thrombosis detected in the  veins visualized. 3. Superficial vein(s) visualized include the great saphenous vein. 4. No evidence of superficial thrombosis detected.   5. Deep venous reflux noted in the femoral, popliteal and calf veins.     Left leg :  1. Deep vein(s) visualized include the common femoral, deep femoral,  proximal femoral, mid femoral, distal femoral, popliteal(above knee),  popliteal(fossa), popliteal(below knee), posterior tibial and peroneal   veins. 2. No evidence of any acute deep venous thrombosis detected in the  veins visualized. 3. Deep venous reflux noted in the femoral, popliteal and calf veins      As read by the radiologist.            Medical Decision Making   I am the first provider for this patient. I reviewed the vital signs, available nursing notes, past medical history, past surgical history, family history and social history. Vital Signs-Reviewed the patient's vital signs. Patient Vitals for the past 12 hrs:   Temp Pulse Resp BP SpO2   11/11/17 2302 97.7 °F (36.5 °C) 96 18 148/65 100 %       Pulse Oximetry Analysis - Normal 100% on room air     Cardiac Monitor:   Rate: 96  Rhythm: Normal Sinus Rhythm      Old Medical Records: Old medical records: He was admitted for a syncopal episode, noted to possible orthostatic from dehydration. Acute kidney injury pre renal. COPD nebs as needed. Pt had a CXR that showed possible pneumonia. Treated with 5 days of Levaquin. Had a VQ scan that showed a low probability of PE.    Nursing notes. Procedures:   Procedures    ED Course:  11:10 PM   Initial assessment performed. The patients presenting problems have been discussed, and they are in agreement with the care plan formulated and outlined with them. I have encouraged them to ask questions as they arise throughout their visit. Medications Given in the ED:  Medications - No data to display    Discharge Note:  12:53 AM   Pt has been reexamined. Patient has no new complaints, changes, or physical findings. Care plan outlined and precautions discussed. Results were reviewed with the patient. All medications were reviewed with the patient; will d/c home with instructions.  All of pt's questions and concerns were addressed. Patient was instructed and agrees to follow up with PCP, as well as to return to the ED upon further deterioration. Patient is ready to go home. Critical Care Time: 0        Diagnosis   Clinical Impression:   1. Dermatitis    2. Dependent edema           Follow-up Information     Follow up With Details Comments Contact Info    Jacqueline Perez MD Schedule an appointment as soon as possible for a visit in 2 days ED Follow-up [facility] [place] [address]      [facility] EMERGENCY DEPT Go to As needed, If symptoms worsen [address] [address]  [phone]          Current Discharge Medication List      START taking these medications    Details   cephALEXin (KEFLEX) 500 mg capsule Take 1 Cap by mouth three (3) times daily for 7 days. Qty: 21 Cap, Refills: 0             _______________________________   Attestations: This note is prepared by Dane Chavira, acting as a Scribe for Navjot Perera PA-C on 11:06 PM on 11/11/2017 . Navjot Perera PA-C : The scribe's documentation has been prepared under my direction and personally reviewed by me in its entirety.   _______________________________

## 2018-10-12 ENCOUNTER — APPOINTMENT (OUTPATIENT)
Dept: GENERAL RADIOLOGY | Age: 73
End: 2018-10-12
Attending: PHYSICIAN ASSISTANT
Payer: MEDICARE

## 2018-10-12 ENCOUNTER — HOSPITAL ENCOUNTER (EMERGENCY)
Age: 73
Discharge: HOME | End: 2018-10-12
Attending: EMERGENCY MEDICINE | Admitting: EMERGENCY MEDICINE
Payer: MEDICARE

## 2018-10-12 VITALS
TEMPERATURE: 97.9 F | RESPIRATION RATE: 16 BRPM | DIASTOLIC BLOOD PRESSURE: 86 MMHG | SYSTOLIC BLOOD PRESSURE: 156 MMHG | HEIGHT: 66 IN | WEIGHT: 174 LBS | BODY MASS INDEX: 27.97 KG/M2 | HEART RATE: 75 BPM | OXYGEN SATURATION: 99 %

## 2018-10-12 DIAGNOSIS — M17.11 PRIMARY OSTEOARTHRITIS OF RIGHT KNEE: ICD-10-CM

## 2018-10-12 DIAGNOSIS — S83.91XA SPRAIN OF RIGHT KNEE, UNSPECIFIED LIGAMENT, INITIAL ENCOUNTER: Primary | ICD-10-CM

## 2018-10-12 PROCEDURE — 73564 X-RAY EXAM KNEE 4 OR MORE: CPT

## 2018-10-12 PROCEDURE — 99283 EMERGENCY DEPT VISIT LOW MDM: CPT

## 2018-10-12 PROCEDURE — L1830 KO IMMOB CANVAS LONG PRE OTS: HCPCS

## 2018-10-12 NOTE — ED PROVIDER NOTES
EMERGENCY DEPARTMENT HISTORY AND PHYSICAL EXAM 
 
Date: 10/12/2018 Patient Name: Carolina Muhammad History of Presenting Illness Chief Complaint Patient presents with  Knee Pain History Provided By: Patient Chief Complaint: right knee pain Duration: this morning Timing:  Acute Location: right knee Modifying Factors: pain with movement and weight bearing. Aleve with relief Associated Symptoms: denies any other associated signs or symptoms Additional History (Context):  
1:55 PM  
Carolina Muhammad is a 72 y.o. male with PMHX of HTN, Parkinson's diease who presents to the emergency department with son C/O right knee pain s/p stepping out of an RV this morning. The patient does not have any pain at rest, but has pain with movement and weight bearing. Pt has taken Aleve with some relief. Reports hx of left leg surgery secondary to trauma, and voices concern that he may have been compensating with his right leg. Denies hx of surgery or injury to the right knee. Other PMHx includes COPD, CVA. Pt denies numbness, weakness, swelling, and any other sxs or complaints. PCP: Blanca Watson MD 
 
Current Outpatient Prescriptions Medication Sig Dispense Refill  primidone (MYSOLINE) 50 mg tablet Take 50 mg by mouth two (2) times a day. Indications: ESSENTIAL TREMOR  albuterol-ipratropium (DUO-NEB) 2.5 mg-0.5 mg/3 ml nebu 3 mL by Nebulization route every four (4) hours as needed. 30 Nebule 0  
 predniSONE (DELTASONE) 10 mg tablet Prednisone 10mg tabs: p.o. 
4 tabs daily for 3 days then drop to  
3 tabs daily for 3 days then drop to  
2 tabs daily for 3 days then drop to  
1 tab daily for 3 days then stop. Dispense 30 tabs 30 Tab 0  
 aspirin 81 mg chewable tablet Take 81 mg by mouth daily.  pravastatin (PRAVACHOL) 40 mg tablet Take 40 mg by mouth nightly.  sertraline (ZOLOFT) 100 mg tablet Take 100 mg by mouth daily.  albuterol (PROVENTIL HFA, VENTOLIN HFA, PROAIR HFA) 90 mcg/actuation inhaler Take  by inhalation.  tiotropium (SPIRIVA WITH HANDIHALER) 18 mcg inhalation capsule Take 1 capsule by inhalation daily.  omeprazole (PRILOSEC) 20 mg capsule Take 1 capsule by mouth daily. 20 capsule 0 Past History Past Medical History: 
Past Medical History:  
Diagnosis Date  Chronic obstructive pulmonary disease ([facility]  Hypertension  Parkinson's disease ([facility]  Stroke ([facility] [address] Past Surgical History: 
Past Surgical History:  
Procedure Laterality Date  HX ORTHOPAEDIC    
 left leg surgery Family History: 
History reviewed. No pertinent family history. Social History: 
Social History Substance Use Topics  Smoking status: Former Smoker  Smokeless tobacco: Never Used  Alcohol use None Allergies: 
No Known Allergies Review of Systems Review of Systems Constitutional: Negative for chills and fever. Cardiovascular: Negative for leg swelling. Musculoskeletal: Positive for arthralgias (right knee) and gait problem. Neurological: Negative for weakness and numbness. All other systems reviewed and are negative. Physical Exam  
 
Vitals:  
 10/12/18 1401 10/12/18 1443 BP: (!) 169/102 156/86 Pulse: 98 75 Resp: 18 16 Temp: 97.9 °F (36.6 °C) SpO2: 98% 99% Weight: 78.9 kg (174 lb) Height: 5' 6\" (1.676 m) Physical Exam  
Constitutional: He is oriented to person, place, and time. He appears well-developed and well-nourished. No distress.  geriatric male in NAD. Alert. In wheelchair in fast track room. Son at bedside. HENT:  
Head: Normocephalic and atraumatic. Right Ear: External ear normal.  
Left Ear: External ear normal.  
Nose: Nose normal.  
Eyes: Conjunctivae are normal.  
Neck: Normal range of motion.   
Cardiovascular: Normal rate, regular rhythm, normal heart sounds and intact distal pulses. Exam reveals no gallop and no friction rub. No murmur heard. Pulses: 
     Dorsalis pedis pulses are 2+ on the right side, and 2+ on the left side. Posterior tibial pulses are 2+ on the left side. Pulmonary/Chest: Effort normal. No accessory muscle usage. No tachypnea. He has no decreased breath sounds. He has no rhonchi. Musculoskeletal: Normal range of motion. Right knee: He exhibits swelling (mild). He exhibits normal range of motion, no effusion, no deformity and no erythema. Tenderness found. Left knee: He exhibits normal range of motion, no swelling and no effusion. No tenderness found. Right lower leg: He exhibits no tenderness, no bony tenderness, no swelling and no edema. Left lower leg: He exhibits no tenderness, no bony tenderness, no swelling and no edema. Right foot: There is normal range of motion, no tenderness, no bony tenderness and no swelling. Left foot: There is normal range of motion, no tenderness, no bony tenderness, no swelling and normal capillary refill. Neurological: He is alert and oriented to person, place, and time. Skin: Skin is warm and dry. He is not diaphoretic. Psychiatric: He has a normal mood and affect. Judgment normal.  
Nursing note and vitals reviewed. Diagnostic Study Results Labs - No results found for this or any previous visit (from the past 12 hour(s)). Radiologic Studies -  
XR KNEE RT MIN 4 V Final Result IMPRESSION: 
 
No acute bony abnormality is identified by plain films. Chondrocalcinosis, and 
spurring which can be seen in the setting of CPPD arthropathy or osteoarthritis. As read by the radiologist.   
 
Medications given in the ED- Medications - No data to display Medical Decision Making I am the first provider for this patient.  
 
I reviewed the vital signs, available nursing notes, past medical history, past surgical history, family history and social history. Vital Signs-Reviewed the patient's vital signs. Pulse Oximetry Analysis - 98% on room air Records Reviewed: Nursing Notes Provider Notes (Medical Decision Making): sprain, strain, ligamentous, fx, OA Procedures: 
Procedures ED Course:  
1:55 PM Initial assessment performed. The patients presenting problems have been discussed, and they are in agreement with the care plan formulated and outlined with them. I have encouraged them to ask questions as they arise throughout their visit. Diagnosis and Disposition Imaging without acute process. No major ligamentous laxity. NVI. OTC pain meds. Knee immobilizer. RICE. Ortho FU. Reasons to RTED discussed with pt. All questions answered. Pt feels comfortable going home at this time. Pt expressed understanding and she agrees with plan. DISCHARGE NOTE: 
2:41 PM  
Gr[employer] Dawkins's  results have been reviewed with him. He has been counseled regarding his diagnosis, treatment, and plan. He verbally conveys understanding and agreement of the signs, symptoms, diagnosis, treatment and prognosis and additionally agrees to follow up as discussed. He also agrees with the care-plan and conveys that all of his questions have been answered. I have also provided discharge instructions for him that include: educational information regarding their diagnosis and treatment, and list of reasons why they would want to return to the ED prior to their follow-up appointment, should his condition change. He has been provided with education for proper emergency department utilization. CLINICAL IMPRESSION: 
 
1. Sprain of right knee, unspecified ligament, initial encounter 2. Primary osteoarthritis of right knee PLAN: 
1. D/C Home 2. Discharge Medication List as of 10/12/2018  2:40 PM  
  
 
3. Follow-up Information Follow up With Details Comments Contact Info Edgardo Case MD Schedule an appointment as soon as possible for a visit in 2 days For orthopedic follow up [address] [place] [address] 
[phone] [facility] EMERGENCY DEPT  As needed, If symptoms worsen [address] [place] [address] 
[phone]  
  
 
_______________________________ Attestations: This note is prepared by Annalise Hyde, acting as Scribe for [employer]. [employer]:  The scribe's documentation has been prepared under my direction and personally reviewed by me in its entirety. I confirm that the note above accurately reflects all work, treatment, procedures, and medical decision making performed by me. 
_______________________________

## 2018-10-12 NOTE — DISCHARGE INSTRUCTIONS
Knee Sprain: Care Instructions  Your Care Instructions    A knee sprain is one or more stretched, partly torn, or completely torn knee ligaments. Ligaments are bands of ropelike tissue that connect bone to bone and make the knee stable. The knee has four main ligaments. Knee sprains often happen because of a twisting or bending injury from sports such as skiing, basketball, soccer, or football. The knee turns one way while the lower or upper leg goes another way. A sprain also can happen when the knee is hit from the side or the front. If a knee ligament is slightly stretched, you will probably need only home treatment. You may need a splint or brace (immobilizer) for a partly torn ligament. A complete tear may need surgery. A minor knee sprain may take up to 6 weeks to heal, while a severe sprain may take months. Follow-up care is a key part of your treatment and safety. Be sure to make and go to all appointments, and call your doctor if you are having problems. It's also a good idea to know your test results and keep a list of the medicines you take. How can you care for yourself at home? · Follow instructions about how much weight you can put on your leg and how to walk with crutches. · Prop up your leg on a pillow when you ice it or anytime you sit or lie down for the next 3 days. Try to keep it above the level of your heart. This will help reduce swelling. · Put ice or a cold pack on your knee for 10 to 20 minutes at a time. Try to do this every 1 to 2 hours for the next 3 days (when you are awake) or until the swelling goes down. Put a thin cloth between the ice and your skin. Do not get the splint wet. · If you have an elastic bandage, make sure it is snug but not so tight that your leg is numb, tingles, or swells below the bandage. You can loosen the bandage if it is too tight. · Your doctor may recommend a brace (immobilizer) to support your knee while it heals. Wear it as directed.   · Ask your doctor if you can take an over-the-counter pain medicine, such as acetaminophen (Tylenol), ibuprofen (Advil, Motrin), or naproxen (Aleve). Be safe with medicines. Read and follow all instructions on the label. When should you call for help? Call 911 anytime you think you may need emergency care. For example, call if:    · You have sudden chest pain and shortness of breath, or you cough up blood.    Call your doctor now or seek immediate medical care if:    · You have increased or severe pain.     · You cannot move your toes or ankle.     · Your foot is cool or pale or changes color.     · You have tingling, weakness, or numbness in your foot or leg.     · Your splint or brace feels too tight.     · You are unable to straighten the knee, or the knee \"locks. \"     · You have signs of a blood clot in your leg, such as:  ¨ Pain in your calf, back of the knee, thigh, or groin. ¨ Redness and swelling in your leg.    Watch closely for changes in your health, and be sure to contact your doctor if:    · Your pain is not getting better or is getting worse. Where can you learn more? Go to http://latonya-cesar.info/. Enter N406 in the search box to learn more about \"Knee Sprain: Care Instructions. \"  Current as of: November 29, 2017  Content Version: 11.8  © 5340-6306 [employer]. Care instructions adapted under license by [employer] (which disclaims liability or warranty for this information). If you have questions about a medical condition or this instruction, always ask your healthcare professional. [address] any warranty or liability for your use of this information.

## 2021-06-27 ENCOUNTER — APPOINTMENT (OUTPATIENT)
Dept: GENERAL RADIOLOGY | Age: 76
DRG: 191 | End: 2021-06-27
Attending: EMERGENCY MEDICINE
Payer: MEDICARE

## 2021-06-27 ENCOUNTER — HOSPITAL ENCOUNTER (INPATIENT)
Age: 76
LOS: 10 days | Discharge: HOME HEALTH | DRG: 191 | End: 2021-07-07
Attending: EMERGENCY MEDICINE | Admitting: FAMILY MEDICINE
Payer: MEDICARE

## 2021-06-27 DIAGNOSIS — R06.02 SHORTNESS OF BREATH: ICD-10-CM

## 2021-06-27 DIAGNOSIS — N30.00 ACUTE CYSTITIS WITHOUT HEMATURIA: ICD-10-CM

## 2021-06-27 DIAGNOSIS — A41.9 SEPSIS WITHOUT ACUTE ORGAN DYSFUNCTION, DUE TO UNSPECIFIED ORGANISM (HCC): ICD-10-CM

## 2021-06-27 DIAGNOSIS — J44.1 CHRONIC OBSTRUCTIVE PULMONARY DISEASE WITH ACUTE EXACERBATION (HCC): Primary | ICD-10-CM

## 2021-06-27 DIAGNOSIS — J20.8 ACUTE BRONCHITIS DUE TO OTHER SPECIFIED ORGANISMS: ICD-10-CM

## 2021-06-27 PROBLEM — J44.9 COPD (CHRONIC OBSTRUCTIVE PULMONARY DISEASE) (HCC): Status: ACTIVE | Noted: 2021-06-27

## 2021-06-27 PROBLEM — R06.00 DYSPNEA: Status: ACTIVE | Noted: 2021-06-27

## 2021-06-27 PROBLEM — R33.9 URINARY RETENTION: Status: ACTIVE | Noted: 2021-06-27

## 2021-06-27 PROBLEM — R53.1 WEAKNESS GENERALIZED: Status: ACTIVE | Noted: 2021-06-27

## 2021-06-27 PROBLEM — N39.0 UTI (URINARY TRACT INFECTION): Status: ACTIVE | Noted: 2021-06-27

## 2021-06-27 LAB
ALBUMIN SERPL-MCNC: 3 G/DL (ref 3.4–5)
ALBUMIN/GLOB SERPL: 1.1 {RATIO} (ref 0.8–1.7)
ALP SERPL-CCNC: 47 U/L (ref 45–117)
ALT SERPL-CCNC: 19 U/L (ref 16–61)
ANION GAP SERPL CALC-SCNC: 8 MMOL/L (ref 3–18)
APPEARANCE UR: CLEAR
AST SERPL-CCNC: 26 U/L (ref 10–38)
ATRIAL RATE: 110 BPM
BACTERIA URNS QL MICRO: ABNORMAL /HPF
BASOPHILS # BLD: 0 K/UL (ref 0–0.1)
BASOPHILS NFR BLD: 0 % (ref 0–2)
BILIRUB SERPL-MCNC: 1.4 MG/DL (ref 0.2–1)
BILIRUB UR QL: ABNORMAL
BNP SERPL-MCNC: 173 PG/ML (ref 0–900)
BUN SERPL-MCNC: 19 MG/DL (ref 7–18)
BUN/CREAT SERPL: 17 (ref 12–20)
CALCIUM SERPL-MCNC: 7.9 MG/DL (ref 8.5–10.1)
CALCULATED P AXIS, ECG09: 81 DEGREES
CALCULATED R AXIS, ECG10: 80 DEGREES
CALCULATED T AXIS, ECG11: 75 DEGREES
CHLORIDE SERPL-SCNC: 98 MMOL/L (ref 100–111)
CK MB CFR SERPL CALC: 2.4 % (ref 0–4)
CK MB SERPL-MCNC: 2.5 NG/ML (ref 5–25)
CK SERPL-CCNC: 105 U/L (ref 39–308)
CO2 SERPL-SCNC: 27 MMOL/L (ref 21–32)
COLOR UR: ABNORMAL
CREAT SERPL-MCNC: 1.13 MG/DL (ref 0.6–1.3)
DIAGNOSIS, 93000: NORMAL
DIFFERENTIAL METHOD BLD: ABNORMAL
EOSINOPHIL # BLD: 0.1 K/UL (ref 0–0.4)
EOSINOPHIL NFR BLD: 1 % (ref 0–5)
EPITH CASTS URNS QL MICRO: ABNORMAL /LPF (ref 0–5)
ERYTHROCYTE [DISTWIDTH] IN BLOOD BY AUTOMATED COUNT: 13.2 % (ref 11.6–14.5)
GLOBULIN SER CALC-MCNC: 2.7 G/DL (ref 2–4)
GLUCOSE BLD STRIP.AUTO-MCNC: 207 MG/DL (ref 70–110)
GLUCOSE SERPL-MCNC: 112 MG/DL (ref 74–99)
GLUCOSE UR STRIP.AUTO-MCNC: NEGATIVE MG/DL
HCT VFR BLD AUTO: 48.1 % (ref 36–48)
HGB BLD-MCNC: 15.9 G/DL (ref 13–16)
HGB UR QL STRIP: ABNORMAL
KETONES UR QL STRIP.AUTO: 15 MG/DL
LACTATE BLD-SCNC: 2.13 MMOL/L (ref 0.4–2)
LACTATE BLD-SCNC: 2.37 MMOL/L (ref 0.4–2)
LACTATE BLD-SCNC: 2.99 MMOL/L (ref 0.4–2)
LACTATE SERPL-SCNC: 4.6 MMOL/L (ref 0.4–2)
LACTATE SERPL-SCNC: 5.1 MMOL/L (ref 0.4–2)
LEUKOCYTE ESTERASE UR QL STRIP.AUTO: ABNORMAL
LYMPHOCYTES # BLD: 1.1 K/UL (ref 0.9–3.6)
LYMPHOCYTES NFR BLD: 11 % (ref 21–52)
MCH RBC QN AUTO: 30.8 PG (ref 24–34)
MCHC RBC AUTO-ENTMCNC: 33.1 G/DL (ref 31–37)
MCV RBC AUTO: 93 FL (ref 74–97)
MONOCYTES # BLD: 1.3 K/UL (ref 0.05–1.2)
MONOCYTES NFR BLD: 13 % (ref 3–10)
MUCOUS THREADS URNS QL MICRO: ABNORMAL /LPF
NEUTS SEG # BLD: 7.1 K/UL (ref 1.8–8)
NEUTS SEG NFR BLD: 74 % (ref 40–73)
NITRITE UR QL STRIP.AUTO: NEGATIVE
P-R INTERVAL, ECG05: 142 MS
PH UR STRIP: 6 [PH] (ref 5–8)
PLATELET # BLD AUTO: 193 K/UL (ref 135–420)
PMV BLD AUTO: 11.8 FL (ref 9.2–11.8)
POTASSIUM SERPL-SCNC: 3.8 MMOL/L (ref 3.5–5.5)
PROT SERPL-MCNC: 5.7 G/DL (ref 6.4–8.2)
PROT UR STRIP-MCNC: 100 MG/DL
Q-T INTERVAL, ECG07: 328 MS
QRS DURATION, ECG06: 70 MS
QTC CALCULATION (BEZET), ECG08: 443 MS
RBC # BLD AUTO: 5.17 M/UL (ref 4.35–5.65)
RBC #/AREA URNS HPF: ABNORMAL /HPF (ref 0–5)
SODIUM SERPL-SCNC: 133 MMOL/L (ref 136–145)
SP GR UR REFRACTOMETRY: 1.02 (ref 1–1.03)
TROPONIN I SERPL-MCNC: 0.02 NG/ML (ref 0–0.04)
UROBILINOGEN UR QL STRIP.AUTO: 1 EU/DL (ref 0.2–1)
VENTRICULAR RATE, ECG03: 110 BPM
WBC # BLD AUTO: 9.6 K/UL (ref 4.6–13.2)
WBC URNS QL MICRO: ABNORMAL /HPF (ref 0–5)

## 2021-06-27 PROCEDURE — 36415 COLL VENOUS BLD VENIPUNCTURE: CPT

## 2021-06-27 PROCEDURE — 96365 THER/PROPH/DIAG IV INF INIT: CPT

## 2021-06-27 PROCEDURE — 87040 BLOOD CULTURE FOR BACTERIA: CPT

## 2021-06-27 PROCEDURE — 96375 TX/PRO/DX INJ NEW DRUG ADDON: CPT

## 2021-06-27 PROCEDURE — 83605 ASSAY OF LACTIC ACID: CPT

## 2021-06-27 PROCEDURE — 94640 AIRWAY INHALATION TREATMENT: CPT

## 2021-06-27 PROCEDURE — 93005 ELECTROCARDIOGRAM TRACING: CPT

## 2021-06-27 PROCEDURE — 74011250636 HC RX REV CODE- 250/636: Performed by: EMERGENCY MEDICINE

## 2021-06-27 PROCEDURE — 87086 URINE CULTURE/COLONY COUNT: CPT

## 2021-06-27 PROCEDURE — 80053 COMPREHEN METABOLIC PANEL: CPT

## 2021-06-27 PROCEDURE — 82962 GLUCOSE BLOOD TEST: CPT

## 2021-06-27 PROCEDURE — 77030013140 HC MSK NEB VYRM -A

## 2021-06-27 PROCEDURE — 81001 URINALYSIS AUTO W/SCOPE: CPT

## 2021-06-27 PROCEDURE — 71045 X-RAY EXAM CHEST 1 VIEW: CPT

## 2021-06-27 PROCEDURE — 99285 EMERGENCY DEPT VISIT HI MDM: CPT

## 2021-06-27 PROCEDURE — 74011250636 HC RX REV CODE- 250/636: Performed by: FAMILY MEDICINE

## 2021-06-27 PROCEDURE — 82553 CREATINE MB FRACTION: CPT

## 2021-06-27 PROCEDURE — 85025 COMPLETE CBC W/AUTO DIFF WBC: CPT

## 2021-06-27 PROCEDURE — 74011636637 HC RX REV CODE- 636/637: Performed by: FAMILY MEDICINE

## 2021-06-27 PROCEDURE — 65660000000 HC RM CCU STEPDOWN

## 2021-06-27 PROCEDURE — 74011000250 HC RX REV CODE- 250: Performed by: FAMILY MEDICINE

## 2021-06-27 PROCEDURE — 74011000250 HC RX REV CODE- 250: Performed by: EMERGENCY MEDICINE

## 2021-06-27 PROCEDURE — 83880 ASSAY OF NATRIURETIC PEPTIDE: CPT

## 2021-06-27 RX ORDER — SODIUM CHLORIDE 0.9 % (FLUSH) 0.9 %
5-10 SYRINGE (ML) INJECTION AS NEEDED
Status: DISCONTINUED | OUTPATIENT
Start: 2021-06-27 | End: 2021-07-02 | Stop reason: SDUPTHER

## 2021-06-27 RX ORDER — SODIUM CHLORIDE 0.9 % (FLUSH) 0.9 %
5-40 SYRINGE (ML) INJECTION EVERY 8 HOURS
Status: DISCONTINUED | OUTPATIENT
Start: 2021-06-27 | End: 2021-07-07 | Stop reason: HOSPADM

## 2021-06-27 RX ORDER — ALBUTEROL SULFATE 90 UG/1
2 AEROSOL, METERED RESPIRATORY (INHALATION)
COMMUNITY
Start: 2020-09-02

## 2021-06-27 RX ORDER — ACETAMINOPHEN 325 MG/1
650 TABLET ORAL
Status: DISCONTINUED | OUTPATIENT
Start: 2021-06-27 | End: 2021-07-07 | Stop reason: HOSPADM

## 2021-06-27 RX ORDER — POLYETHYLENE GLYCOL 3350 17 G/17G
17 POWDER, FOR SOLUTION ORAL DAILY PRN
Status: DISCONTINUED | OUTPATIENT
Start: 2021-06-27 | End: 2021-07-07 | Stop reason: HOSPADM

## 2021-06-27 RX ORDER — CEPHALEXIN 500 MG/1
500 CAPSULE ORAL 3 TIMES DAILY
COMMUNITY
Start: 2021-06-23 | End: 2021-07-07

## 2021-06-27 RX ORDER — IPRATROPIUM BROMIDE AND ALBUTEROL SULFATE 2.5; .5 MG/3ML; MG/3ML
3 SOLUTION RESPIRATORY (INHALATION)
Status: COMPLETED | OUTPATIENT
Start: 2021-06-27 | End: 2021-06-27

## 2021-06-27 RX ORDER — DEXTROSE 50 % IN WATER (D50W) INTRAVENOUS SYRINGE
25-50 AS NEEDED
Status: DISCONTINUED | OUTPATIENT
Start: 2021-06-27 | End: 2021-07-07 | Stop reason: HOSPADM

## 2021-06-27 RX ORDER — BUDESONIDE 0.5 MG/2ML
500 INHALANT ORAL
Status: DISCONTINUED | OUTPATIENT
Start: 2021-06-27 | End: 2021-07-07 | Stop reason: HOSPADM

## 2021-06-27 RX ORDER — IPRATROPIUM BROMIDE AND ALBUTEROL SULFATE 2.5; .5 MG/3ML; MG/3ML
3 SOLUTION RESPIRATORY (INHALATION)
Status: DISCONTINUED | OUTPATIENT
Start: 2021-06-27 | End: 2021-07-07 | Stop reason: HOSPADM

## 2021-06-27 RX ORDER — SODIUM CHLORIDE 9 MG/ML
125 INJECTION, SOLUTION INTRAVENOUS CONTINUOUS
Status: DISCONTINUED | OUTPATIENT
Start: 2021-06-27 | End: 2021-07-01

## 2021-06-27 RX ORDER — SODIUM CHLORIDE 0.9 % (FLUSH) 0.9 %
5-40 SYRINGE (ML) INJECTION AS NEEDED
Status: DISCONTINUED | OUTPATIENT
Start: 2021-06-27 | End: 2021-07-07 | Stop reason: HOSPADM

## 2021-06-27 RX ORDER — ENOXAPARIN SODIUM 100 MG/ML
40 INJECTION SUBCUTANEOUS DAILY
Status: DISCONTINUED | OUTPATIENT
Start: 2021-06-28 | End: 2021-07-07 | Stop reason: HOSPADM

## 2021-06-27 RX ORDER — INSULIN LISPRO 100 [IU]/ML
INJECTION, SOLUTION INTRAVENOUS; SUBCUTANEOUS
Status: DISCONTINUED | OUTPATIENT
Start: 2021-06-27 | End: 2021-07-07 | Stop reason: HOSPADM

## 2021-06-27 RX ORDER — ONDANSETRON 4 MG/1
4 TABLET, ORALLY DISINTEGRATING ORAL
Status: DISCONTINUED | OUTPATIENT
Start: 2021-06-27 | End: 2021-07-07 | Stop reason: HOSPADM

## 2021-06-27 RX ORDER — ONDANSETRON 2 MG/ML
4 INJECTION INTRAMUSCULAR; INTRAVENOUS
Status: DISCONTINUED | OUTPATIENT
Start: 2021-06-27 | End: 2021-07-07 | Stop reason: HOSPADM

## 2021-06-27 RX ORDER — MAGNESIUM SULFATE 100 %
4 CRYSTALS MISCELLANEOUS AS NEEDED
Status: DISCONTINUED | OUTPATIENT
Start: 2021-06-27 | End: 2021-07-07 | Stop reason: HOSPADM

## 2021-06-27 RX ORDER — ACETAMINOPHEN 650 MG/1
650 SUPPOSITORY RECTAL
Status: DISCONTINUED | OUTPATIENT
Start: 2021-06-27 | End: 2021-07-07 | Stop reason: HOSPADM

## 2021-06-27 RX ADMIN — INSULIN LISPRO 2 UNITS: 100 INJECTION, SOLUTION INTRAVENOUS; SUBCUTANEOUS at 22:45

## 2021-06-27 RX ADMIN — SODIUM CHLORIDE 1000 ML: 900 INJECTION, SOLUTION INTRAVENOUS at 11:55

## 2021-06-27 RX ADMIN — SODIUM CHLORIDE 313 ML: 900 INJECTION, SOLUTION INTRAVENOUS at 11:59

## 2021-06-27 RX ADMIN — METHYLPREDNISOLONE SODIUM SUCCINATE 60 MG: 40 INJECTION, POWDER, FOR SOLUTION INTRAMUSCULAR; INTRAVENOUS at 17:46

## 2021-06-27 RX ADMIN — METHYLPREDNISOLONE SODIUM SUCCINATE 125 MG: 125 INJECTION, POWDER, FOR SOLUTION INTRAMUSCULAR; INTRAVENOUS at 12:00

## 2021-06-27 RX ADMIN — BUDESONIDE 500 MCG: 0.5 INHALANT RESPIRATORY (INHALATION) at 20:36

## 2021-06-27 RX ADMIN — AZITHROMYCIN MONOHYDRATE 500 MG: 500 INJECTION, POWDER, LYOPHILIZED, FOR SOLUTION INTRAVENOUS at 12:00

## 2021-06-27 RX ADMIN — IPRATROPIUM BROMIDE AND ALBUTEROL SULFATE 3 ML: .5; 3 SOLUTION RESPIRATORY (INHALATION) at 12:01

## 2021-06-27 RX ADMIN — IPRATROPIUM BROMIDE AND ALBUTEROL SULFATE 3 ML: .5; 3 SOLUTION RESPIRATORY (INHALATION) at 14:32

## 2021-06-27 RX ADMIN — CEFTRIAXONE SODIUM 2 G: 2 INJECTION, POWDER, FOR SOLUTION INTRAMUSCULAR; INTRAVENOUS at 12:00

## 2021-06-27 RX ADMIN — IPRATROPIUM BROMIDE AND ALBUTEROL SULFATE 3 ML: .5; 3 SOLUTION RESPIRATORY (INHALATION) at 20:36

## 2021-06-27 RX ADMIN — Medication 10 ML: at 17:38

## 2021-06-27 RX ADMIN — SODIUM CHLORIDE 125 ML/HR: 900 INJECTION, SOLUTION INTRAVENOUS at 17:38

## 2021-06-27 NOTE — ED NOTES
TRANSFER - OUT REPORT:    Verbal report given to Yandy Dias RN (name) on Corona Ureña  being transferred to tele (unit) for routine progression of care       Report consisted of patients Situation, Background, Assessment and   Recommendations(SBAR). Information from the following report(s) SBAR, ED Summary, Procedure Summary, MAR, Recent Results and Cardiac Rhythm Sinus tach/NSR was reviewed with the receiving nurse. Lines:   Peripheral IV 06/27/21 Left Antecubital (Active)   Site Assessment Clean, dry, & intact 06/27/21 1113   Phlebitis Assessment 0 06/27/21 1113   Infiltration Assessment 0 06/27/21 1113   Dressing Status Clean, dry, & intact 06/27/21 1113   Dressing Type Transparent 06/27/21 1113   Hub Color/Line Status Pink;Flushed 06/27/21 1113   Action Taken Blood drawn 06/27/21 1113       Peripheral IV 06/27/21 Right Wrist (Active)   Site Assessment Clean, dry, & intact 06/27/21 1121   Phlebitis Assessment 0 06/27/21 1121   Infiltration Assessment 0 06/27/21 1121   Dressing Status Clean, dry, & intact 06/27/21 1121   Dressing Type Transparent 06/27/21 1121   Hub Color/Line Status Pink;Flushed 06/27/21 1121   Action Taken Blood drawn 06/27/21 1121        Opportunity for questions and clarification was provided.       Patient transported with:   Registered Nurse

## 2021-06-27 NOTE — H&P
History & Physical    Patient: Sintia Hensley MRN: [id number]  CSN: [id number]    YOB: 1945  Age: 78 y.o. Sex: male      DOA: 6/27/2021  Primary Care Provider:  Iris Hall MD      Assessment/Plan   72-year-old male past medical history of hypertension, COPD, Parkinson's and CVA who is being admitted for sepsis secondary to urinary source, urinary tract infection, COPD exacerbation and urinary retention. Sepsis secondary to urinary source -with associated lactic acidosis and tachycardia and generalized weakness  No leukocytosis  Very abnormal UA  Will order urine culture, but antibiotics already given so results may be skewed  Blood cultures x2 ordered by ED  IV antibiotics in place  Lactic acid elevated almost 3, trending down with IV fluid hydration  Continue IV hydration    Urinary tract infection-  Abnormal urinalysis  We will order urine culture  Continue IV antibiotics    Urinary retention -  Patient has Novak in place, will not remove now, defer to urology  Will consult urology tomorrow    COPD exacerbation -  Patient given IV Solu-Medrol and duo nebs in ED with improvement  Continue IV Solu-Medrol  Order Pulmicort every 12 hours   Order duo nebs as needed  Chest x-ray unremarkable    Code Status: DNR/DNI     Power of : Kaitlyn Gonzalez (son), [phone]    Patient Active Problem List   Diagnosis Code    Syncope R55    Hypertension I10    Chronic obstructive pulmonary disease ([address] J44.9    Parkinson's disease ([address] G20    COPD exacerbation ([address] J44.1    UTI (urinary tract infection) N39.0    Dyspnea R06.00    Sepsis ([address] A41.9    Urinary retention R33.9    Weakness generalized R53.1     Estimated length of stay: 2-3 days    CC: Shortness of breath       HPI:     Sintia Hensley is a 78 y.o. male with past medical history of Hypertension, COPD, Parkinson's, CVA who presents to the emergency department complaining of shortness of breath onset 4 days ago. Associated sxs include dysuria, inability to urinate, and worsening weakness. He was seen 4 days ago at [facility] in [place] and diagnosed with urinary tract infection, acute kidney injury and urinary retention. He was discharged home with antibiotics, namely Keflex after given been given a dose of Rocephin. He was also diagnosed home with a leg bag in place. Since then, patient has developed shortness of breath, malaise and worsening weakness to the point where he can hardly get out of bed. Denies fever, chills or night sweats. Denies nausea vomiting or diarrhea. Denies chest pain    Past Medical History:   Diagnosis Date    [facility] obstructive [facility] disease [facility]     Hypertension     Parkinson's disease ([facility]     Stroke [facility])        Past Surgical History:   Procedure Laterality Date    HX ORTHOPAEDIC      left leg surgery       History reviewed. No pertinent family history. Social History     Socioeconomic History    Marital status:      Spouse name: Not on file    Number of children: Not on file    Years of education: Not on file    Highest education level: Not on file   Tobacco Use    Smoking status: Former Smoker    Smokeless tobacco: Never Used     Social Determinants of Health     Financial Resource Strain:     Difficulty of Paying Living Expenses:    Food Insecurity:     Worried About Running Out of Food in the Last Year:     [address] in the Last Year:    Transportation Needs:     Lack of Transportation (Medical):      Lack of Transportation (Non-Medical):    Physical Activity:     Days of Exercise per Week:     Minutes of Exercise per Session:    Stress:     Feeling of Stress :    Social Connections:     Frequency of Communication with Friends and Family:     Frequency of Social Gatherings with Friends and Family:     Attends Yazidism Services:     Active Member of Clubs or Organizations:     Attends Club or Organization Meetings:     Marital Status:        Prior to Admission medications    Medication Sig Start Date End Date Taking? Authorizing Provider   cephALEXin (KEFLEX) 500 mg capsule Take 500 mg by mouth three (3) times daily. 6/23/21 7/3/21 Yes Malinda Corbin MD   albuterol (PROVENTIL HFA, VENTOLIN HFA, PROAIR HFA) 90 mcg/actuation inhaler Take 2 Puffs by inhalation every four (4) hours as needed. 20  Yes Emerald, MD Malinda   tiotropium (SPIRIVA WITH HANDIHALER) 18 mcg inhalation capsule Take 1 capsule by inhalation daily. Yes Other, MD Malinda   albuterol-ipratropium (DUO-NEB) 2.5 mg-0.5 mg/3 ml nebu 3 mL by Nebulization route every four (4) hours as needed. 10/31/17   Vena Nyhan, MD   albuterol (PROVENTIL HFA, VENTOLIN HFA, PROAIR HFA) 90 mcg/actuation inhaler Take  by inhalation. Other, MD Malinda       No Known Allergies    Review of Systems  Gen: No fever, chills, malaise, weight loss/gain. Heent: No headache, rhinorrhea, epistaxis, ear pain, hearing loss, sinus pain, neck pain/stiffness, sore throat. Heart: No chest pain, palpitations, MCMILLAN, pnd, or orthopnea. Resp: No cough, hemoptysis, wheezing and shortness of breath. GI: No nausea, vomiting, diarrhea, constipation, melena or hematochezia. : No urinary obstruction, dysuria or hematuria. Derm: No rash, new skin lesion or pruritis. Musc/skeletal: no bone or joint complains. Vasc: No edema, cyanosis or claudication. Endo: No heat/cold intolerance, no polyuria,polydipsia or polyphagia. Neuro: No unilateral weakness, numbness, tingling. No seizures. Heme: No easy bruising or bleeding.           Physical Exam:     Physical Exam:  Visit Vitals  BP (!) 142/68   Pulse (!) 103   Temp 98.6 °F (37 °C)   Resp 14   Ht 5' 7\" (1.702 m)   Wt 77.1 kg (170 lb)   SpO2 99%   BMI 26.63 kg/m²      O2 Device: None (Room air)    Temp (24hrs), Av.5 °F (36.9 °C), Min:98.3 °F (36.8 °C), Max:98.6 °F (37 °C)    701 -  1900  In: 1300 [I.V.:1300]  Out: -    No intake/output data recorded. General:  Awake, cooperative, no distress. Head:  Normocephalic, without obvious abnormality, atraumatic. Eyes:  Conjunctivae/corneas clear, sclera anicteric, PERRL, EOMs intact. Nose: Nares normal. No drainage or sinus tenderness. Throat: Lips, mucosa, and tongue normal.    Neck: Supple, symmetrical, trachea midline, no adenopathy. Lungs:    Diminished breath sounds with expiratory wheezing       Heart:  Regular rate and rhythm, S1, S2 normal, no murmur, click, rub or gallop. Abdomen: Soft, non-tender. Bowel sounds normal. No masses,  No organomegaly. Extremities: Extremities normal, atraumatic, no cyanosis or edema. Capillary refill normal. Novak leg bag in place, filled with urine   Pulses: 2+ and symmetric all extremities. Skin: Skin color pink/pale/mottled/flushed, turgor normal. No rashes or lesions   Neurologic: CNII-XII intact. No focal motor or sensory deficit.        Labs Reviewed:    Recent Results (from the past 24 hour(s))   EKG, 12 LEAD, INITIAL    Collection Time: 06/27/21 11:01 AM   Result Value Ref Range    Ventricular Rate 110 BPM    Atrial Rate 110 BPM    P-R Interval 142 ms    QRS Duration 70 ms    Q-T Interval 328 ms    QTC Calculation (Bezet) 443 ms    Calculated P Axis 81 degrees    Calculated R Axis 80 degrees    Calculated T Axis 75 degrees    Diagnosis       Sinus tachycardia  Septal infarct , age undetermined  Abnormal ECG  When compared with ECG of 26-OCT-2017 23:10,  fusion complexes are no longer present  premature ventricular complexes are no longer present  T wave inversion no longer evident in Inferior leads  T wave inversion no longer evident in Lateral leads     POC LACTIC ACID    Collection Time: 06/27/21 11:09 AM   Result Value Ref Range    Lactic Acid (POC) 2.99 (HH) 0.40 - 6.67 mmol/L   METABOLIC PANEL, COMPREHENSIVE    Collection Time: 06/27/21 11:10 AM   Result Value Ref Range    Sodium 133 (L) 136 - 145 mmol/L    Potassium 3.8 3.5 - 5.5 mmol/L    Chloride 98 (L) 100 - 111 mmol/L    CO2 27 21 - 32 mmol/L    Anion gap 8 3.0 - 18 mmol/L    Glucose 112 (H) 74 - 99 mg/dL    BUN 19 (H) 7.0 - 18 MG/DL    Creatinine 1.13 0.6 - 1.3 MG/DL    BUN/Creatinine ratio 17 12 - 20      GFR est AA >60 >60 ml/min/1.73m2    GFR est non-AA >60 >60 ml/min/1.73m2    Calcium 7.9 (L) 8.5 - 10.1 MG/DL    Bilirubin, total 1.4 (H) 0.2 - 1.0 MG/DL    ALT (SGPT) 19 16 - 61 U/L    AST (SGOT) 26 10 - 38 U/L    Alk. phosphatase 47 45 - 117 U/L    Protein, total 5.7 (L) 6.4 - 8.2 g/dL    Albumin 3.0 (L) 3.4 - 5.0 g/dL    Globulin 2.7 2.0 - 4.0 g/dL    A-G Ratio 1.1 0.8 - 1.7     CBC WITH AUTOMATED DIFF    Collection Time: 06/27/21 11:10 AM   Result Value Ref Range    WBC 9.6 4.6 - 13.2 K/uL    RBC 5.17 4.35 - 5.65 M/uL    HGB 15.9 13.0 - 16.0 g/dL    HCT 48.1 (H) 36.0 - 48.0 %    MCV 93.0 74.0 - 97.0 FL    MCH 30.8 24.0 - 34.0 PG    MCHC 33.1 31.0 - 37.0 g/dL    RDW 13.2 11.6 - 14.5 %    PLATELET 084 948 - 425 K/uL    MPV 11.8 9.2 - 11.8 FL    NEUTROPHILS 74 (H) 40 - 73 %    LYMPHOCYTES 11 (L) 21 - 52 %    MONOCYTES 13 (H) 3 - 10 %    EOSINOPHILS 1 0 - 5 %    BASOPHILS 0 0 - 2 %    ABS. NEUTROPHILS 7.1 1.8 - 8.0 K/UL    ABS. LYMPHOCYTES 1.1 0.9 - 3.6 K/UL    ABS. MONOCYTES 1.3 (H) 0.05 - 1.2 K/UL    ABS. EOSINOPHILS 0.1 0.0 - 0.4 K/UL    ABS.  BASOPHILS 0.0 0.0 - 0.1 K/UL    DF AUTOMATED     CARDIAC PANEL,(CK, CKMB & TROPONIN)    Collection Time: 06/27/21 11:10 AM   Result Value Ref Range    CK - MB 2.5 <3.6 ng/ml    CK-MB Index 2.4 0.0 - 4.0 %     39 - 308 U/L    Troponin-I, QT 0.02 0.0 - 0.045 NG/ML   NT-PRO BNP    Collection Time: 06/27/21 11:10 AM   Result Value Ref Range    NT pro- 0 - 900 PG/ML   URINALYSIS W/ RFLX MICROSCOPIC    Collection Time: 06/27/21 11:20 AM   Result Value Ref Range    Color DARK YELLOW      Appearance CLEAR      Specific gravity 1.023 1.005 - 1.030      pH (UA) 6.0 5.0 - 8.0      Protein 100 (A) NEG mg/dL    Glucose Negative NEG mg/dL Ketone 15 (A) NEG mg/dL    Bilirubin SMALL (A) NEG      Blood LARGE (A) NEG      Urobilinogen 1.0 0.2 - 1.0 EU/dL    Nitrites Negative NEG      Leukocyte Esterase MODERATE (A) NEG     POC LACTIC ACID    Collection Time: 06/27/21 11:20 AM   Result Value Ref Range    Lactic Acid (POC) 2.37 (HH) 0.40 - 2.00 mmol/L   URINE MICROSCOPIC ONLY    Collection Time: 06/27/21 11:20 AM   Result Value Ref Range    WBC 21 to 35 0 - 5 /hpf    RBC TOO NUMEROUS TO COUNT 0 - 5 /hpf    Epithelial cells 1+ 0 - 5 /lpf    Bacteria 3+ (A) NEG /hpf    Mucus 1+ (A) NEG /lpf   POC LACTIC ACID    Collection Time: 06/27/21  2:40 PM   Result Value Ref Range    Lactic Acid (POC) 2.13 (HH) 0.40 - 2.00 mmol/L       Procedures/imaging: see electronic medical records for all procedures/Xrays and details which were not copied into this note but were reviewed prior to creation of Plan.     CC: Kesha Barone MD

## 2021-06-27 NOTE — ROUTINE PROCESS
[facility] Medic Code Sepsis  -   - Time of RRT: N/A  - Time of Code Sepsis: 11:24  - Team:  Physician Dr. Tawana Hunt  Bedside Nurse  RN Fadi CAMPOS  Supervisor DARION Silverio  - SIRS # 1 [id number]  SIRS # 2 RR-21  - Possible source of infection: UTI  - Organ dysfunction related to sepsis: LA > 2  - Labs:  (pull in Chem 7 and CBC)  - Initial Vitals: (pull in from EPIC)  - Blood Cultures collected times 2 OR collected within last 24 hours:  1ST SET @ 11:10, 2ND SET @ 11:17  - Lactic Acid Collection time OR within last 6 hours: 11:09  - Antibiotic Start time: 12:00  - Lactic Acid Result:2.99  - Target Fluid Bolus Amount: 2,313 ml  - Time of Fluid Bolus initiated: 11:55  - Reason for no target fluid bolus:  N/A  - New PIV / or line: YES    -  Time of Fluid Bolus Completion: 13:42  - Cardiopulmonary response after fluid completion:  IMPROVEMENT  - Time of Repeat Lactic Acid: 11:20  - Repeat Lactic Acid Result: 2.37,,, ANOTHER @ 14:40 = 2.13  - Repeat Vitals: (pull in from EPIC)  - Vasopressors Needed? NEGATIVE  - Debriefed with RN YES,NOTHING FURTHER NEEDED    - Additional Notes:  - Transfer to higher level of care? YES, PATIENT WILL BE ADMITTED TO TELEMETRY.

## 2021-06-27 NOTE — PROGRESS NOTES
1609 TRANSFER - IN REPORT:    Verbal report received from Melba San RN (name) on Gallo Hernandez  being received from ED (unit) for routine progression of care      Report consisted of patients Situation, Background, Assessment and   Recommendations(SBAR). Information from the following report(s) SBAR, Kardex, ED Summary, [facility] and Cardiac Rhythm SR was reviewed with the receiving nurse. Opportunity for questions and clarification was provided. Assessment completed upon patients arrival to unit and care assumed. 1910 Bedside and Verbal shift change report given to Liza christine RN (oncoming nurse) by Jared Esquivel RN (offgoing nurse). Report included the following information SBAR, Kardex, MAR, Recent Results and Cardiac Rhythm .

## 2021-06-27 NOTE — ED PROVIDER NOTES
EMERGENCY DEPARTMENT HISTORY AND PHYSICAL EXAM    Date: 6/27/2021  Patient Name: Haily Núñez    History of Presenting Illness     Chief Complaint   Patient presents with    Shortness of Breath       History Provided By: Patient and friend     History Del Dus):   10:57 AM  Haily Núñez is a 78 y.o. male with PMHX of Hypertension, COPD, Parkinson's, CVA who presents to the emergency department C/O shortness of breath onset 4 days ago. Associated sxs include dysuria. Pt denies any other sxs or complaints. Patient and friend state that he was seen 4 days ago at [facility] in [place]. He was treated for a UTI, urinary retention (with a Novak left in place) and acute kidney injury. Chief Complaint: Dyspnea  Onset: 4 days ago  Timing: Acute  Context: Symptoms started spontaneously, symptoms have worsened since onset  Location: Lungs  Quality: Tightness  Severity: Moderate  Modifying Factors: Nothing makes it better, or worse.   Associated Symptoms: Dysuria    PCP: Nicholas Zeng MD     Current Facility-Administered Medications   Medication Dose Route Frequency Provider Last Rate Last Admin    sodium chloride (NS) flush 5-10 mL  5-10 mL IntraVENous PRN Jamie Haro MD        cefTRIAXone (ROCEPHIN) 2 g in sterile water (preservative free) 20 mL IV syringe  2 g IntraVENous Q24H Catrachito Monaco MD   2 g at 06/27/21 1200    azithromycin (ZITHROMAX) 500 mg in 0.9% sodium chloride 250 mL (VIAL-MATE)  500 mg IntraVENous Q24H Jamie Haro MD   IV Completed at 06/27/21 1342    sodium chloride (NS) flush 5-40 mL  5-40 mL IntraVENous Q8H Mireya Sandoval MD   10 mL at 06/27/21 1738    sodium chloride (NS) flush 5-40 mL  5-40 mL IntraVENous PRN Mireya Sandoval MD        acetaminophen (TYLENOL) tablet 650 mg  650 mg Oral Q6H PRN Mireya Sandoval MD        Or    acetaminophen (TYLENOL) suppository 650 mg  650 mg Rectal Q6H PRN Mireya Sandoval MD        polyethylene glycol (MIRALAX) packet 17 g  17 g Oral DAILY PRN Mari Sandoval MD        ondansetron (ZOFRAN ODT) tablet 4 mg  4 mg Oral Q8H PRN Mari Sandoval MD        Or    ondansetron (ZOFRAN) injection 4 mg  4 mg IntraVENous Q6H PRN Mari Sandoval MD        [START ON 6/28/2021] enoxaparin (LOVENOX) injection 40 mg  40 mg SubCUTAneous DAILY Mari Sandoval MD        0.9% sodium chloride infusion  125 mL/hr IntraVENous CONTINUOUS Mari Sandoval  mL/hr at 06/27/21 1738 125 mL/hr at 06/27/21 1738    methylPREDNISolone (PF) (SOLU-MEDROL) injection 60 mg  60 mg IntraVENous Q6H Warner Sandoval MD   60 mg at 06/27/21 1746    albuterol-ipratropium (DUO-NEB) 2.5 MG-0.5 MG/3 ML  3 mL Nebulization Q4H PRN Warner Sandoval MD   3 mL at 06/27/21 2036    budesonide (PULMICORT) 500 mcg/2 ml nebulizer suspension  500 mcg Nebulization BID RT Warner Sandoval MD   500 mcg at 06/27/21 2036    insulin lispro (HUMALOG) injection   SubCUTAneous AC&HS Mari Sandoval MD        glucose chewable tablet 16 g  4 Tablet Oral PRN Mari Sandoval MD        glucagon (GLUCAGEN) injection 1 mg  1 mg IntraMUSCular PRN Warner Sandoval MD        dextrose (D50W) injection syrg 12.5-25 g  25-50 mL IntraVENous PRN Mari Sandoval MD           Past History         Past Medical History:  Past Medical History:   Diagnosis Date    Chronic obstructive pulmonary disease ([facility]     Hypertension     Parkinson's disease ([facility]     Stroke ([facility]        Past Surgical History:  Past Surgical History:   Procedure Laterality Date    HX ORTHOPAEDIC      left leg surgery       Family History:  History reviewed. No pertinent family history.   Reviewed and non-contributory    Social History:  Social History     Tobacco Use    Smoking status: Former Smoker    Smokeless tobacco: Never Used   Substance Use Topics    Alcohol use: Not on file    Drug use: Not on file       Allergies:  No Known Allergies      Review of Systems      Review of Systems   Constitutional: Negative for chills and fever. HENT: Negative for rhinorrhea and sore throat. Eyes: Negative for pain and visual disturbance. Respiratory: Positive for shortness of breath. Negative for chest tightness and wheezing. Cardiovascular: Negative for chest pain and palpitations. Gastrointestinal: Negative for abdominal pain, diarrhea, nausea and vomiting. Musculoskeletal: Negative for arthralgias and myalgias. Skin: Negative for rash and wound. Neurological: Negative for speech difficulty, light-headedness and headaches. Psychiatric/Behavioral: Negative for agitation and confusion. All other systems reviewed and are negative. Physical Exam     Vitals:    06/27/21 1606 06/27/21 1747 06/27/21 1932 06/27/21 2038   BP: (!) 142/68 (!) 154/83 134/74    Pulse: (!) 103 100 75    Resp: 14 15 16    Temp: 98.6 °F (37 °C) 98 °F (36.7 °C) 97.4 °F (36.3 °C)    SpO2: 99% 98% 94% 94%   Weight:       Height:           Physical Exam  Vitals and nursing note reviewed. Constitutional:       General: He is not in acute distress. Appearance: Normal appearance. He is normal weight. He is not ill-appearing. HENT:      Head: Normocephalic and atraumatic. Nose: Nose normal. No rhinorrhea. Mouth/Throat:      Mouth: Mucous membranes are moist.      Pharynx: No oropharyngeal exudate or posterior oropharyngeal erythema. Eyes:      Extraocular Movements: Extraocular movements intact. Conjunctiva/sclera: Conjunctivae normal.      Pupils: Pupils are equal, round, and reactive to light. Cardiovascular:      Rate and Rhythm: Normal rate and regular rhythm. Heart sounds: No murmur heard. No friction rub. No gallop. Pulmonary:      Effort: Pulmonary effort is normal. No respiratory distress. Breath sounds: Examination of the right-upper field reveals wheezing and rales. Examination of the left-upper field reveals wheezing and rales. Examination of the right-middle field reveals wheezing and rales. Examination of the left-middle field reveals wheezing and rales. Examination of the right-lower field reveals wheezing and rales. Examination of the left-lower field reveals wheezing and rales. Wheezing and rales present. No rhonchi. Abdominal:      General: Bowel sounds are normal.      Palpations: Abdomen is soft. Tenderness: There is no abdominal tenderness. There is no guarding or rebound. Genitourinary:     Comments: Novak in place with a leg bag  Musculoskeletal:         General: No swelling, tenderness or deformity. Normal range of motion. Cervical back: Normal range of motion and neck supple. No rigidity. Right lower le+ Edema present. Lymphadenopathy:      Cervical: No cervical adenopathy. Skin:     General: Skin is warm and dry. Findings: No rash. Neurological:      General: No focal deficit present. Mental Status: He is alert and oriented to person, place, and time.    Psychiatric:         Mood and Affect: Mood normal.         Behavior: Behavior normal.         Diagnostic Study Results     Labs -     Recent Results (from the past 12 hour(s))   EKG, 12 LEAD, INITIAL    Collection Time: 21 11:01 AM   Result Value Ref Range    Ventricular Rate 110 BPM    Atrial Rate 110 BPM    P-R Interval 142 ms    QRS Duration 70 ms    Q-T Interval 328 ms    QTC Calculation (Bezet) 443 ms    Calculated P Axis 81 degrees    Calculated R Axis 80 degrees    Calculated T Axis 75 degrees    Diagnosis       Sinus tachycardia  Non-specific ST/T wave changes  Septal infarct , age undetermined  Abnormal ECG  When compared with ECG of 26-OCT-2017 23:10,  fusion complexes are no longer present  premature ventricular complexes are no longer present    Confirmed by Tex Martinez MD. ([id number]) on 2021 8:45:33 PM     POC LACTIC ACID    Collection Time: 21 11:09 AM   Result Value Ref Range    Lactic Acid (POC) 2.99 (HH) 0.40 - 1.11 mmol/L   METABOLIC PANEL, COMPREHENSIVE    Collection Time: 06/27/21 11:10 AM   Result Value Ref Range    Sodium 133 (L) 136 - 145 mmol/L    Potassium 3.8 3.5 - 5.5 mmol/L    Chloride 98 (L) 100 - 111 mmol/L    CO2 27 21 - 32 mmol/L    Anion gap 8 3.0 - 18 mmol/L    Glucose 112 (H) 74 - 99 mg/dL    BUN 19 (H) 7.0 - 18 MG/DL    Creatinine 1.13 0.6 - 1.3 MG/DL    BUN/Creatinine ratio 17 12 - 20      GFR est AA >60 >60 ml/min/1.73m2    GFR est non-AA >60 >60 ml/min/1.73m2    Calcium 7.9 (L) 8.5 - 10.1 MG/DL    Bilirubin, total 1.4 (H) 0.2 - 1.0 MG/DL    ALT (SGPT) 19 16 - 61 U/L    AST (SGOT) 26 10 - 38 U/L    Alk. phosphatase 47 45 - 117 U/L    Protein, total 5.7 (L) 6.4 - 8.2 g/dL    Albumin 3.0 (L) 3.4 - 5.0 g/dL    Globulin 2.7 2.0 - 4.0 g/dL    A-G Ratio 1.1 0.8 - 1.7     CBC WITH AUTOMATED DIFF    Collection Time: 06/27/21 11:10 AM   Result Value Ref Range    WBC 9.6 4.6 - 13.2 K/uL    RBC 5.17 4.35 - 5.65 M/uL    HGB 15.9 13.0 - 16.0 g/dL    HCT 48.1 (H) 36.0 - 48.0 %    MCV 93.0 74.0 - 97.0 FL    MCH 30.8 24.0 - 34.0 PG    MCHC 33.1 31.0 - 37.0 g/dL    RDW 13.2 11.6 - 14.5 %    PLATELET 153 208 - 646 K/uL    MPV 11.8 9.2 - 11.8 FL    NEUTROPHILS 74 (H) 40 - 73 %    LYMPHOCYTES 11 (L) 21 - 52 %    MONOCYTES 13 (H) 3 - 10 %    EOSINOPHILS 1 0 - 5 %    BASOPHILS 0 0 - 2 %    ABS. NEUTROPHILS 7.1 1.8 - 8.0 K/UL    ABS. LYMPHOCYTES 1.1 0.9 - 3.6 K/UL    ABS. MONOCYTES 1.3 (H) 0.05 - 1.2 K/UL    ABS. EOSINOPHILS 0.1 0.0 - 0.4 K/UL    ABS.  BASOPHILS 0.0 0.0 - 0.1 K/UL    DF AUTOMATED     CARDIAC PANEL,(CK, CKMB & TROPONIN)    Collection Time: 06/27/21 11:10 AM   Result Value Ref Range    CK - MB 2.5 <3.6 ng/ml    CK-MB Index 2.4 0.0 - 4.0 %     39 - 308 U/L    Troponin-I, QT 0.02 0.0 - 0.045 NG/ML   NT-PRO BNP    Collection Time: 06/27/21 11:10 AM   Result Value Ref Range    NT pro- 0 - 900 PG/ML   URINALYSIS W/ RFLX MICROSCOPIC Collection Time: 06/27/21 11:20 AM   Result Value Ref Range    Color DARK YELLOW      Appearance CLEAR      Specific gravity 1.023 1.005 - 1.030      pH (UA) 6.0 5.0 - 8.0      Protein 100 (A) NEG mg/dL    Glucose Negative NEG mg/dL    Ketone 15 (A) NEG mg/dL    Bilirubin SMALL (A) NEG      Blood LARGE (A) NEG      Urobilinogen 1.0 0.2 - 1.0 EU/dL    Nitrites Negative NEG      Leukocyte Esterase MODERATE (A) NEG     POC LACTIC ACID    Collection Time: 06/27/21 11:20 AM   Result Value Ref Range    Lactic Acid (POC) 2.37 (HH) 0.40 - 2.00 mmol/L   URINE MICROSCOPIC ONLY    Collection Time: 06/27/21 11:20 AM   Result Value Ref Range    WBC 21 to 35 0 - 5 /hpf    RBC TOO NUMEROUS TO COUNT 0 - 5 /hpf    Epithelial cells 1+ 0 - 5 /lpf    Bacteria 3+ (A) NEG /hpf    Mucus 1+ (A) NEG /lpf   POC LACTIC ACID    Collection Time: 06/27/21  2:40 PM   Result Value Ref Range    Lactic Acid (POC) 2.13 (HH) 0.40 - 2.00 mmol/L   LACTIC ACID    Collection Time: 06/27/21  7:24 PM   Result Value Ref Range    Lactic acid 4.6 (HH) 0.4 - 2.0 MMOL/L   GLUCOSE, POC    Collection Time: 06/27/21  9:02 PM   Result Value Ref Range    Glucose (POC) 207 (H) 70 - 110 mg/dL       Radiologic Studies -     11:36 AM  RADIOLOGY FINDINGS  Chest X-ray shows no acute cardiopulmonary process. Pending review by Radiologist  Recorded by Richie Mckenna MD.    XR CHEST PORT   Final Result      No acute cardiopulmonary process. CT Results  (Last 48 hours)    None        CXR Results  (Last 48 hours)               06/27/21 1143  XR CHEST PORT Final result    Impression:      No acute cardiopulmonary process. Narrative:  EXAM: XR CHEST PORT       CLINICAL INDICATION/HISTORY: meets SIRS criteria   -Additional: None       COMPARISON: 10/29/2017       TECHNIQUE: Portable frontal view of the chest       _______________       FINDINGS:       SUPPORT DEVICES: None. HEART AND MEDIASTINUM: Unremarkable. LUNGS AND PLEURAL SPACES: Unremarkable. BONY THORAX AND SOFT TISSUES: Unremarkable.       _______________                 Medications given in the ED-  Medications   sodium chloride (NS) flush 5-10 mL (has no administration in time range)   cefTRIAXone (ROCEPHIN) 2 g in sterile water (preservative free) 20 mL IV syringe (2 g IntraVENous Given 6/27/21 1200)   azithromycin (ZITHROMAX) 500 mg in 0.9% sodium chloride 250 mL (VIAL-MATE) (0 mg IntraVENous IV Completed 6/27/21 1342)   sodium chloride (NS) flush 5-40 mL (10 mL IntraVENous Given 6/27/21 1738)   sodium chloride (NS) flush 5-40 mL (has no administration in time range)   acetaminophen (TYLENOL) tablet 650 mg (has no administration in time range)     Or   acetaminophen (TYLENOL) suppository 650 mg (has no administration in time range)   polyethylene glycol (MIRALAX) packet 17 g (has no administration in time range)   ondansetron (ZOFRAN ODT) tablet 4 mg (has no administration in time range)     Or   ondansetron (ZOFRAN) injection 4 mg (has no administration in time range)   enoxaparin (LOVENOX) injection 40 mg (has no administration in time range)   0.9% sodium chloride infusion (125 mL/hr IntraVENous New Bag 6/27/21 1738)   methylPREDNISolone (PF) (SOLU-MEDROL) injection 60 mg (60 mg IntraVENous Given 6/27/21 1746)   albuterol-ipratropium (DUO-NEB) 2.5 MG-0.5 MG/3 ML (3 mL Nebulization Given 6/27/21 2036)   budesonide (PULMICORT) 500 mcg/2 ml nebulizer suspension (500 mcg Nebulization Given 6/27/21 2036)   insulin lispro (HUMALOG) injection ( SubCUTAneous Missed 6/27/21 1630)   glucose chewable tablet 16 g (has no administration in time range)   glucagon (GLUCAGEN) injection 1 mg (has no administration in time range)   dextrose (D50W) injection syrg 12.5-25 g (has no administration in time range)   methylPREDNISolone (PF) (Solu-MEDROL) injection 125 mg (125 mg IntraVENous Given 6/27/21 1200)   albuterol-ipratropium (DUO-NEB) 2.5 MG-0.5 MG/3 ML (3 mL Nebulization Given 6/27/21 1201)   sodium chloride 0.9 % bolus infusion 1,000 mL (0 mL IntraVENous IV Completed 6/27/21 1342)     Followed by   sodium chloride 0.9 % bolus infusion 1,000 mL (0 mL IntraVENous IV Completed 6/27/21 1342)     Followed by   sodium chloride 0.9 % bolus infusion 313 mL (0 mL IntraVENous IV Completed 6/27/21 1342)   albuterol-ipratropium (DUO-NEB) 2.5 MG-0.5 MG/3 ML (3 mL Nebulization Given 6/27/21 1432)         Medical Decision Making   I am the first provider for this patient. I reviewed the vital signs, available nursing notes, past medical history, past surgical history, family history and social history. Vital Signs-Reviewed the patient's vital signs. Records Reviewed: Nursing Notes and Previous Laboratory Studies   Creatinine on 23 June 2021 was 1.61      Pulse Oximetry Analysis - 100% on RA     Cardiac Monitor:  Rate: 114 bpm  Rhythm: tahycardic rhythm    EKG interpretation: (Preliminary)  Rhythm: Sinus tachycardia. Rate: 110 bpm; no STEMI  EKG read by Marta Dorantes MD at 11:01 AM    Provider Notes (Medical Decision Making):   DDX: Sepsis, UTI, COPD, occult infection, pneumonia, URI    Procedures:  Procedures    ED Course:   10:57 AM Initial assessment performed. The patients presenting problems have been discussed, and they are in agreement with the care plan formulated and outlined with them. I have encouraged them to ask questions as they arise throughout their visit.     SEPSIS ASSESSMENT NOTE:   11:28 AM  Marta Dorantes MD, has seen and assessed the patient as follows:    Capillary Refill:normal/brisk  Cardiopulmonary Evaluation:   Lung Sounds: crackles, wheezing   Cardiac Sounds: rapid rate, regular rhythm  Peripheral Pulses: present  Skin Exam: skin unremarkable, pink, warm    Visit Vitals  /74 (BP 1 Location: Right upper arm, BP Patient Position: At rest)   Pulse 75   Temp 97.4 °F (36.3 °C)   Resp 16   Ht 5' 7\" (1.702 m)   Wt 77.1 kg (170 lb)   SpO2 94%   BMI 26.63 kg/m²       11:28 AM  Code Sepsis called. 11:29 AM  Fluids WERE NOT started due to dyspnea and clinical volume overload. 11:30 AM  Antibiotics were started. 11:36 AM chest x-ray does not demonstrate any pulmonary infiltrates, will start 30 mL/kg fluid bolus at this time. 2:20 PM patient's breathing has improved, he remains tachycardic after his fluids, will give another DuoNeb.    3:19 PM Discussed with Dr. Jonathan Blankenship, she will admit pt to telemetry. Diagnosis and Disposition     Discussion:  78 y.o. male with recent diagnosis of UTI, his vitals today met criteria for sepsis protocol activation his lactate was elevated. Patient does not have an elevation of his white blood cell count today, but does have a left shift. His heart rate remains tachycardic despite his fluid bolus. His breathing has improved slightly with steroids and nebulizers. He would likely benefit from inpatient admission for continued IV antibiotics and continued nebulizer treatments in the hospital.  Discussed with hospitalist who agrees to admit. Discussed the patient understands and agrees the plan for admission. Critical Care Time: 0 minutes    Core Measures:  For Hospitalized Patients:    1. Hospitalization Decision Time:  The decision to hospitalize the patient was made by Josh Vargas MD, MD at 2:50 PM on 6/27/2021    2. Aspirin: Aspirin was not given because the patient did not present with a stroke at the time of their Emergency Department evaluation    3:19 PM Patient is being admitted to the hospital by Dr. Jonathan Blankenship. The results of their tests and reasons for their admission have been discussed with them and/or available family. They convey agreement and understanding for the need to be admitted and for their admission diagnosis. CONDITIONS ON ADMISSION:  Sepsis is present at the time of admission. Deep Vein Thrombosis is not present at the time of admission. Thrombosis is not present at the time of admission.  Urinary Tract Infection is present at the time of admission. Pneumonia is not present at the time of admission. MRSA is not present at the time of admission. Wound infection is not present at the time of admission. Pressure Ulcer is not present at the time of admission. CLINICAL IMPRESSION:    1. Chronic obstructive pulmonary disease with acute exacerbation ([address]    2. Acute cystitis without hematuria    3. Shortness of breath    4. Sepsis without acute organ dysfunction, due to unspecified organism [facility])        Dragon Disclaimer     Please note that this dictation was completed with Roambi, the computer voice recognition software. Quite often unanticipated grammatical, syntax, homophones, and other interpretive errors are inadvertently transcribed by the computer software. Please disregard these errors. Please excuse any errors that have escaped final proofreading.     Starla Monahan MD

## 2021-06-27 NOTE — ED TRIAGE NOTES
Pt states \" I was seen at [facility] and told he had  UTI. \" Also he is very weak and short of breath. Pt has a congested cough.

## 2021-06-28 ENCOUNTER — APPOINTMENT (OUTPATIENT)
Dept: NON INVASIVE DIAGNOSTICS | Age: 76
DRG: 191 | End: 2021-06-28
Attending: INTERNAL MEDICINE
Payer: MEDICARE

## 2021-06-28 ENCOUNTER — APPOINTMENT (OUTPATIENT)
Dept: GENERAL RADIOLOGY | Age: 76
DRG: 191 | End: 2021-06-28
Attending: INTERNAL MEDICINE
Payer: MEDICARE

## 2021-06-28 ENCOUNTER — APPOINTMENT (OUTPATIENT)
Dept: CT IMAGING | Age: 76
DRG: 191 | End: 2021-06-28
Attending: FAMILY MEDICINE
Payer: MEDICARE

## 2021-06-28 LAB
ANION GAP SERPL CALC-SCNC: 9 MMOL/L (ref 3–18)
BUN SERPL-MCNC: 17 MG/DL (ref 7–18)
BUN/CREAT SERPL: 17 (ref 12–20)
CALCIUM SERPL-MCNC: 7.2 MG/DL (ref 8.5–10.1)
CHLORIDE SERPL-SCNC: 105 MMOL/L (ref 100–111)
CO2 SERPL-SCNC: 23 MMOL/L (ref 21–32)
CREAT SERPL-MCNC: 0.98 MG/DL (ref 0.6–1.3)
ERYTHROCYTE [DISTWIDTH] IN BLOOD BY AUTOMATED COUNT: 13.3 % (ref 11.6–14.5)
EST. AVERAGE GLUCOSE BLD GHB EST-MCNC: 103 MG/DL
GLUCOSE BLD STRIP.AUTO-MCNC: 133 MG/DL (ref 70–110)
GLUCOSE BLD STRIP.AUTO-MCNC: 136 MG/DL (ref 70–110)
GLUCOSE BLD STRIP.AUTO-MCNC: 140 MG/DL (ref 70–110)
GLUCOSE BLD STRIP.AUTO-MCNC: 141 MG/DL (ref 70–110)
GLUCOSE SERPL-MCNC: 142 MG/DL (ref 74–99)
HBA1C MFR BLD: 5.2 % (ref 4.2–5.6)
HCT VFR BLD AUTO: 40.8 % (ref 36–48)
HGB BLD-MCNC: 13.7 G/DL (ref 13–16)
LACTATE SERPL-SCNC: 2.5 MMOL/L (ref 0.4–2)
LACTATE SERPL-SCNC: 4.5 MMOL/L (ref 0.4–2)
LACTATE SERPL-SCNC: 4.7 MMOL/L (ref 0.4–2)
LACTATE SERPL-SCNC: 5.8 MMOL/L (ref 0.4–2)
MCH RBC QN AUTO: 31.2 PG (ref 24–34)
MCHC RBC AUTO-ENTMCNC: 33.6 G/DL (ref 31–37)
MCV RBC AUTO: 92.9 FL (ref 74–97)
PLATELET # BLD AUTO: 171 K/UL (ref 135–420)
PMV BLD AUTO: 12 FL (ref 9.2–11.8)
POTASSIUM SERPL-SCNC: 4.2 MMOL/L (ref 3.5–5.5)
RBC # BLD AUTO: 4.39 M/UL (ref 4.35–5.65)
SODIUM SERPL-SCNC: 137 MMOL/L (ref 136–145)
WBC # BLD AUTO: 8.6 K/UL (ref 4.6–13.2)

## 2021-06-28 PROCEDURE — 82962 GLUCOSE BLOOD TEST: CPT

## 2021-06-28 PROCEDURE — 74176 CT ABD & PELVIS W/O CONTRAST: CPT

## 2021-06-28 PROCEDURE — 74011000250 HC RX REV CODE- 250: Performed by: FAMILY MEDICINE

## 2021-06-28 PROCEDURE — 97535 SELF CARE MNGMENT TRAINING: CPT

## 2021-06-28 PROCEDURE — 74011250636 HC RX REV CODE- 250/636: Performed by: INTERNAL MEDICINE

## 2021-06-28 PROCEDURE — 83605 ASSAY OF LACTIC ACID: CPT

## 2021-06-28 PROCEDURE — 77030040831 HC BAG URINE DRNG MDII -A

## 2021-06-28 PROCEDURE — 65660000000 HC RM CCU STEPDOWN

## 2021-06-28 PROCEDURE — 74011250636 HC RX REV CODE- 250/636: Performed by: FAMILY MEDICINE

## 2021-06-28 PROCEDURE — 74011250636 HC RX REV CODE- 250/636: Performed by: EMERGENCY MEDICINE

## 2021-06-28 PROCEDURE — 80048 BASIC METABOLIC PNL TOTAL CA: CPT

## 2021-06-28 PROCEDURE — 94640 AIRWAY INHALATION TREATMENT: CPT

## 2021-06-28 PROCEDURE — 83036 HEMOGLOBIN GLYCOSYLATED A1C: CPT

## 2021-06-28 PROCEDURE — 97165 OT EVAL LOW COMPLEX 30 MIN: CPT

## 2021-06-28 PROCEDURE — 85027 COMPLETE CBC AUTOMATED: CPT

## 2021-06-28 PROCEDURE — 74011250637 HC RX REV CODE- 250/637: Performed by: FAMILY MEDICINE

## 2021-06-28 PROCEDURE — 97162 PT EVAL MOD COMPLEX 30 MIN: CPT

## 2021-06-28 PROCEDURE — 71045 X-RAY EXAM CHEST 1 VIEW: CPT

## 2021-06-28 PROCEDURE — 97116 GAIT TRAINING THERAPY: CPT

## 2021-06-28 PROCEDURE — 74011000250 HC RX REV CODE- 250: Performed by: EMERGENCY MEDICINE

## 2021-06-28 PROCEDURE — 36415 COLL VENOUS BLD VENIPUNCTURE: CPT

## 2021-06-28 RX ORDER — FUROSEMIDE 10 MG/ML
40 INJECTION INTRAMUSCULAR; INTRAVENOUS ONCE
Status: COMPLETED | OUTPATIENT
Start: 2021-06-28 | End: 2021-06-28

## 2021-06-28 RX ORDER — BENZONATATE 100 MG/1
100 CAPSULE ORAL
Status: DISCONTINUED | OUTPATIENT
Start: 2021-06-28 | End: 2021-07-07 | Stop reason: HOSPADM

## 2021-06-28 RX ORDER — FLUTICASONE PROPIONATE 50 MCG
2 SPRAY, SUSPENSION (ML) NASAL DAILY
Status: DISCONTINUED | OUTPATIENT
Start: 2021-06-28 | End: 2021-07-07 | Stop reason: HOSPADM

## 2021-06-28 RX ADMIN — Medication 10 ML: at 23:30

## 2021-06-28 RX ADMIN — METHYLPREDNISOLONE SODIUM SUCCINATE 60 MG: 40 INJECTION, POWDER, FOR SOLUTION INTRAMUSCULAR; INTRAVENOUS at 18:13

## 2021-06-28 RX ADMIN — FLUTICASONE PROPIONATE 2 SPRAY: 50 SPRAY, METERED NASAL at 20:42

## 2021-06-28 RX ADMIN — IPRATROPIUM BROMIDE AND ALBUTEROL SULFATE 3 ML: .5; 3 SOLUTION RESPIRATORY (INHALATION) at 18:10

## 2021-06-28 RX ADMIN — SODIUM CHLORIDE 125 ML/HR: 900 INJECTION, SOLUTION INTRAVENOUS at 08:23

## 2021-06-28 RX ADMIN — BUDESONIDE 500 MCG: 0.5 INHALANT RESPIRATORY (INHALATION) at 21:36

## 2021-06-28 RX ADMIN — BENZONATATE 100 MG: 100 CAPSULE ORAL at 20:42

## 2021-06-28 RX ADMIN — METHYLPREDNISOLONE SODIUM SUCCINATE 60 MG: 40 INJECTION, POWDER, FOR SOLUTION INTRAMUSCULAR; INTRAVENOUS at 07:00

## 2021-06-28 RX ADMIN — METHYLPREDNISOLONE SODIUM SUCCINATE 60 MG: 40 INJECTION, POWDER, FOR SOLUTION INTRAMUSCULAR; INTRAVENOUS at 01:00

## 2021-06-28 RX ADMIN — IPRATROPIUM BROMIDE AND ALBUTEROL SULFATE 3 ML: .5; 3 SOLUTION RESPIRATORY (INHALATION) at 21:48

## 2021-06-28 RX ADMIN — ENOXAPARIN SODIUM 40 MG: 40 INJECTION SUBCUTANEOUS at 08:23

## 2021-06-28 RX ADMIN — CEFTRIAXONE SODIUM 2 G: 2 INJECTION, POWDER, FOR SOLUTION INTRAMUSCULAR; INTRAVENOUS at 11:15

## 2021-06-28 RX ADMIN — AZITHROMYCIN MONOHYDRATE 500 MG: 500 INJECTION, POWDER, LYOPHILIZED, FOR SOLUTION INTRAVENOUS at 11:17

## 2021-06-28 RX ADMIN — Medication 10 ML: at 13:48

## 2021-06-28 RX ADMIN — BUDESONIDE 500 MCG: 0.5 INHALANT RESPIRATORY (INHALATION) at 08:09

## 2021-06-28 RX ADMIN — Medication 10 ML: at 01:10

## 2021-06-28 RX ADMIN — METHYLPREDNISOLONE SODIUM SUCCINATE 60 MG: 40 INJECTION, POWDER, FOR SOLUTION INTRAMUSCULAR; INTRAVENOUS at 11:13

## 2021-06-28 RX ADMIN — Medication 10 ML: at 07:17

## 2021-06-28 RX ADMIN — FUROSEMIDE 40 MG: 10 INJECTION, SOLUTION INTRAMUSCULAR; INTRAVENOUS at 18:23

## 2021-06-28 NOTE — PROGRESS NOTES
Reason for Admission:  fatique and SOB                     RUR Score: 12                    Plan for utilizing home health:     TBD     PCP: First and Last name:  Srikanth Rudolph MD     Name of Practice:    Are you a current patient: Yes/No:    Approximate date of last visit:    Can you participate in a virtual visit with your PCP:                     Current Advanced Directive/Advance Care Plan: DNR      Healthcare Decision Maker:   Click here to complete [id number] including selection of the Healthcare Decision Maker Relationship (ie \"Primary\")                             Transition of Care Plan:   Chart reviewed  Per ED note pt being admitted for UTI, pt reports being seen 4 days ago at [facility] in [place] and was d/c home with UTI and YA with urine retention, discharged with an urine leg bag, hx includes HTN,stroke,COPD,Parkinson's , cm did visit pt and spouse at discharge, pt plans to return back home, independent with care,pt would like to go to [facility] if PT recommend rehab, referral placed after PT recommendation, pt states he does live alone in his RV since his wife passed, mainly spends his summer's here in [place] and winter months traveling visiting friends, all patients physicians are local, pt denies having an address only PO box, cm will cont to remain available for d/c planing. Care Management Interventions  PCP Verified by CM: Yes  Palliative Care Criteria Met (RRAT>21 & CHF Dx)?: No  Mode of Transport at Discharge:  Other (see comment) (family)  Physical Therapy Consult: Yes  Occupational Therapy Consult: Yes  Current Support Network: Lives Alone  Confirm Follow Up Transport: Friends  Discharge Location  Discharge Placement: Home

## 2021-06-28 NOTE — ROUTINE PROCESS
Bedside and Verbal shift change report given to Rosalie Grajeda (oncoming nurse) by Candis Christie RN (offgoing nurse). Report included the following information SBAR, Kardex, Intake/Output, MAR and Recent Results.

## 2021-06-28 NOTE — PROGRESS NOTES
Problem: Mobility Impaired (Adult and Pediatric)  Goal: *Acute Goals and Plan of Care (Insert Text)  Description: Physical Therapy Goals   Initiated 6/28/2021 and to be accomplished within 5-7 day(s)  1. Patient will move from supine <> sit with mod I in prep for out of bed activity and change of position. 2.  Patient will perform sit<> stand with mod I with LRAD in prep for transfers/ambulation. 3.  Patient will transfer from bed <> chair with mod I with LRAD for time up in chair for completion of ADL activity. 4.  Patient will ambulate 150 feet with S/LRAD for improved functional mobility at discharge. 5.  Patient will ascend/descend 3-5 stairs with handrail(s) with minimal assistance/contact guard assist for home re-entry as needed. Outcome: Progressing Towards Goal  PHYSICAL THERAPY EVALUATION    Patient: Juan Carlos Vila (21 y.o. male)  Date: 6/28/2021  Primary Diagnosis: Dyspnea [R06.00]  COPD (chronic obstructive pulmonary disease) (formerly [facility]) [J44.9]  Sepsis ([facility] [address] [A41.9]  UTI (urinary tract infection) [N39.0]  Precautions:   Fall  PLOF: amb with SPC, lives in [address] [place] [address] which is currently camped in [address] [place] [address]. ASSESSMENT :  Based on the objective data described below, the patient is seen on telemetry unit. Pt presents with decreased ROM/motor performance, limitations in functional mobility with regard to bed mobility/ transfers, decreased gait quality/balance and decreased activity tolerance r/t current dx. Patient reports pain 0/10 pre and 0/10 post session. Demonstrates transition to sit EOB with supervision, transfers to stand with CGA and able to complete GT/RW/CGA/min A ~42 ft. Pt with intermittent episodes of coughing and noted MCMILLAN. O2 sat on RA mid 80's and requires ~2 min to increase to 100% after transition supine to sit EOB. Same occurs post GT with SPC. Pt left back sitting up in bed with all needs in reach, and nurse Warden Medina notified of above.   Pt educated regarding PT POC and activity tolerance. Recommend Rehab vs HHPT for follow up physical therapy on discharge, pending progress, to reach maximal level of independence/safety with functional mobility. Patient will benefit from skilled intervention to address the above impairments. Pt Education: Role of physical therapy in acute care setting, fall prevention and safety/technique during functional mobility tasks    Patient's rehabilitation potential is considered to be Good  Factors which may influence rehabilitation potential include:   []         None noted  []         Mental ability/status  [x]         Medical condition  [x]         Home/family situation and support systems  []         Safety awareness  []         Pain tolerance/management  []         Other:      PLAN :  Recommendations and Planned Interventions:   [x]           Bed Mobility Training             []    Neuromuscular Re-Education  [x]           Transfer Training                   []    Orthotic/Prosthetic Training  [x]           Gait Training                          []    Modalities  [x]           Therapeutic Exercises           []    Edema Management/Control  [x]           Therapeutic Activities            []    Family Training/Education  [x]           Patient Education  []           Other (comment):    Frequency/Duration: Patient will be followed by physical therapy 1-2 times per day/4-7 days per week to address goals.   Discharge Recommendations: Rehab vs Home Health pending progress  Further Equipment Recommendations for Discharge: N/A     SUBJECTIVE:   Patient stated I'm upright and mobile - almost.    OBJECTIVE DATA SUMMARY:     Past Medical History:   Diagnosis Date    Chronic obstructive pulmonary disease ([facility] [address]     Hypertension     Parkinson's disease ([facility] [address]     Stroke ([facility] [address]      Past Surgical History:   Procedure Laterality Date    HX ORTHOPAEDIC      left leg surgery     Barriers to Learning/Limitations: yes;  physical  Compensate with: Verbal Cues  Home Situation:  Home Situation  Home Environment: Other (comment) (lives in , camped in [place] currently)  # Steps to Enter: 3  Rails to Enter: Yes  Hand Rails : Left  Wheelchair Ramp: No  One/Two Story Residence: One story  Living Alone: Yes  Support Systems: None  Patient Expects to be Discharged to[de-identified] Rehabilitation facility  Current DME Used/Available at Home: [employer], straight, Walker, rollator  Tub or Shower Type: Shower  Critical Behavior:  Neurologic State: Alert; Appropriate for age  Orientation Level: Oriented X4  Cognition: Follows commands  Safety/Judgement: Awareness of environment; Fall prevention  Psychosocial  Patient Behaviors: Calm; Cooperative; Talkative  Purposeful Interaction: Yes  Strength:    Strength: Generally decreased, functional  Tone & Sensation:   Tone: Normal  Sensation: Intact  Range Of Motion:  AROM: Generally decreased, functional  Functional Mobility:  Bed Mobility:  Supine to Sit: Supervision  Scooting: Supervision  Transfers:  Sit to Stand: Contact guard assistance  Stand to Sit: Contact guard assistance  Balance:   Sitting: Intact  Standing: Intact; With support  Ambulation/Gait Training:  Distance (ft): 42 Feet (ft)  Assistive Device: Gait belt;Cane, straight  Ambulation - Level of Assistance: Minimal assistance;Contact guard assistance  Gait Description (WDL): Exceptions to WDL  Gait Abnormalities: Decreased step clearance  Speed/Riri: Pace decreased (<100 feet/min)  Step Length: Right shortened;Left shortened  Interventions: Safety awareness training;Verbal cues  Pain:  Pain level pre-treatment: 0/10   Pain level post-treatment: 0/10   Pain Intervention(s) : Medication (see MAR); Rest, Ice, Repositioning  Response to intervention: Nurse notified, See doc flow  Activity Tolerance:   Fair -: limited by intermittent coughing and MCMILLAN  Please refer to the flowsheet for vital signs taken during this treatment.   After treatment:   []         Patient left in no apparent distress sitting up in chair  [x]         Patient left in no apparent distress in bed  [x]         Call bell left within reach  [x]         Nursing notified  []         Caregiver present  []         Bed alarm activated  []         SCDs applied    COMMUNICATION/EDUCATION:   [x]         Role of Physical Therapy in the acute care setting. [x]         Fall prevention education was provided and the patient/caregiver indicated understanding. [x]         Patient/family have participated as able in goal setting and plan of care. [x]         Patient/family agree to work toward stated goals and plan of care. []         Patient understands intent and goals of therapy, but is neutral about his/her participation. []         Patient is unable to participate in goal setting/plan of care: ongoing with therapy staff.  []         Other:     Thank you for this referral.  Nellie , PT   Time Calculation: 26 mins      Eval Complexity: History: HIGH Complexity :3+ comorbidities / personal factors will impact the outcome/ POC Exam:MEDIUM Complexity : 3 Standardized tests and measures addressing body structure, function, activity limitation and / or participation in recreation  Presentation: MEDIUM Complexity : Evolving with changing characteristics  Clinical Decision Making:Medium Complexity    Overall Complexity:MEDIUM

## 2021-06-28 NOTE — PROGRESS NOTES
Problem: Self Care Deficits Care Plan (Adult)  Goal: *Acute Goals and Plan of Care (Insert Text)  Description: Occupational Therapy Goals  Initiated 6/28/2021 within 7 day(s). 1.  Patient will perform grooming with supervision/set-up standing at sink for 5 minutes or more. 2.  Patient will perform upper body dressing with independence. 3.  Patient will perform lower body dressing with supervision/set-up. 4.  Patient will perform toilet transfers with supervision/set-up. 5.  Patient will perform all aspects of toileting with supervision/set-up. 6.  Patient will participate in upper extremity therapeutic exercise/activities with supervision/set-up for 10 minutes. 7.  Patient will utilize energy conservation techniques during functional activities with verbal, visual, and tactile cues. OCCUPATIONAL THERAPY EVALUATION    Patient: Quique Ryan (53 y.o. male)  Date: 6/28/2021  Primary Diagnosis: Dyspnea [R06.00]  COPD (chronic obstructive pulmonary disease) ([facility]) [J44.9]  Sepsis ([address] [A41.9]  UTI (urinary tract infection) [N39.0]        Precautions:   Fall  PLOF: mod I for transfers and ADLs     ASSESSMENT :  Based on the objective data described below, the patient presents with decreased strength, endurance and balance for carryover of ADLs and transfers with safety following above mentioned medical diagnosis. Pt presented supine in bed at the beginning of session and requires S-CGA for bed mobility, CGA for sit<>stand transfers, CGA for toilet transfers and grooming standing at sink during this session. Pt was left seated at EOB at the end of session in NAD, pain 0/10 at this time. Patient will benefit from skilled intervention to address the above impairments.   Patient's rehabilitation potential is considered to be Good  Factors which may influence rehabilitation potential include:   []             None noted  []             Mental ability/status  [x]             Medical condition  [] Home/family situation and support systems  []             Safety awareness  []             Pain tolerance/management  []             Other:      PLAN :  Recommendations and Planned Interventions:   [x]               Self Care Training                  [x]      Therapeutic Activities  [x]               Functional Mobility Training   []      Cognitive Retraining  [x]               Therapeutic Exercises           [x]      Endurance Activities  [x]               Balance Training                    []      Neuromuscular Re-Education  []               Visual/Perceptual Training     [x]      Home Safety Training  [x]               Patient Education                   [x]      Family Training/Education  []               Other (comment):    Frequency/Duration: Patient will be followed by occupational therapy 1-2 times per day/4-7 days per week to address goals. Discharge Recommendations: Rehab  Further Equipment Recommendations for Discharge: N/A     SUBJECTIVE:   Patient stated I have been travelling in my RV for the past 3 years.     OBJECTIVE DATA SUMMARY:     Past Medical History:   Diagnosis Date    Chronic obstructive pulmonary disease ([facility] [address]     Hypertension     Parkinson's disease ([facility] [address]     Stroke ([facility] [address]      Past Surgical History:   Procedure Laterality Date    HX ORTHOPAEDIC      left leg surgery     Barriers to Learning/Limitations: None  Compensate with: visual, verbal, tactile, kinesthetic cues/model    Home Situation:   Home Situation  Home Environment:  (lives in )  # Steps to Enter: 3  Rails to Enter: Yes  Hand Rails : Left  Wheelchair Ramp: No  One/Two Story Residence: One story  Living Alone: Yes  Support Systems: Child(socorro)  Patient Expects to be Discharged to[de-identified] Rehabilitation facility  Current DME Used/Available at Home: [employer], straight, Walker, rolling, Shower chair  Tub or Shower Type: Shower  []  Right hand dominant   []  Left hand dominant    Cognitive/Behavioral Status:  Neurologic State: Alert  Orientation Level: Oriented X4  Cognition: Appropriate for age attention/concentration; Follows commands  Safety/Judgement: Fall prevention    Skin: intact  Edema: none    Vision/Perceptual:    Tracking: Able to track stimulus in all quadrants w/o difficulty    Corrective Lenses: Glasses  Coordination: BUE  Coordination: Within functional limits  Fine Motor Skills-Upper: Left Intact; Right Intact    Gross Motor Skills-Upper: Left Intact; Right Intact  Balance:  Sitting: Intact  Standing: Intact; With support  Strength: BUE  Strength: Generally decreased, functional  Tone & Sensation: BUE  Tone: Normal  Sensation: Intact  Range of Motion: BUE  AROM: Generally decreased, functional  Functional Mobility and Transfers for ADLs:  Bed Mobility:  Supine to Sit: Supervision  Scooting: Supervision  Transfers:  Sit to Stand: Contact guard assistance  Stand to Sit: Contact guard assistance   Toilet Transfer : Contact guard assistance  ADL Assessment:   Feeding: Independent    Oral Facial Hygiene/Grooming: Setup    Upper Body Dressing: Independent    Lower Body Dressing: Contact guard assistance    Toileting: Contact guard assistance  ADL Intervention:  Grooming  Grooming Assistance: Stand-by assistance;Contact guard assistance  Position Performed: Standing  Washing Hands: Contact guard assistance;Stand-by assistance    Lower Body Dressing Assistance  Dressing Assistance: Contact guard assistance;Stand-by assistance  Socks: Contact guard assistance;Stand-by assistance  Leg Crossed Method Used: Yes  Position Performed: Seated edge of bed  Cues: Don    Cognitive Retraining  Safety/Judgement: Fall prevention    Pain:  Pain level pre-treatment: 0/10   Pain level post-treatment: 0/10   Pain Intervention(s): Medication (see MAR); Rest, Ice, Repositioning   Response to intervention: Nurse notified, See doc flow    Activity Tolerance:   Fair     Please refer to the flowsheet for vital signs taken during this treatment.   After treatment:   [x] Patient left in no apparent distress sitting up at EOB  [] Patient left in no apparent distress in bed  [x] Call bell left within reach  [x] Nursing notified  [] Caregiver present  [x] Bed alarm activated    COMMUNICATION/EDUCATION:   [x] Role of Occupational Therapy in the acute care setting  [x] Home safety education was provided and the patient/caregiver indicated understanding. [x] Patient/family have participated as able in goal setting and plan of care. [x] Patient/family agree to work toward stated goals and plan of care. [] Patient understands intent and goals of therapy, but is neutral about his/her participation. [] Patient is unable to participate in goal setting and plan of care. Thank you for this referral.  Chari Bernal OTR/L  Time Calculation: 20 mins    Eval Complexity: History: MEDIUM Complexity : Expanded review of history including physical, cognitive and psychosocial  history ; Examination: MEDIUM Complexity : 3-5 performance deficits relating to physical, cognitive , or psychosocial skils that result in activity limitations and / or participation restrictions; Decision Making:MEDIUM Complexity : Patient may present with comorbidities that affect occupational performnce.  Miniml to moderate modification of tasks or assistance (eg, physical or verbal ) with assesment(s) is necessary to enable patient to complete evaluation

## 2021-06-28 NOTE — PROGRESS NOTES
Hospitalist Progress Note    Patient: Vel Course MRN: [id number]  CSN: [id number]    YOB: 1945  Age: 78 y.o. Sex: male    DOA: 6/27/2021 LOS:  LOS: 1 day          Chief Complaint:    -year-old male past medical history of hypertension, COPD, Parkinson's and CVA who is being admitted for sepsis secondary to urinary source, urinary tract infection, COPD exacerbation and urinary retention. Pt constipated and tachycardia ST prior to BM  Chills improved  Still wheezing and coughing  Blood and urine cultures neg so far          Assessment/Plan   1. Sepsis likely from urinary source with improved tachycardia with an lactic acidosis with fluids and antibiotics continue hydration  2. UTI with acute urinary retention requiring Novak cath placement 5 days ago likely source of sepsis CT of abdomen pelvis negative for stones urology consult pending continue with Novak catheter for now on IV antibiotics  3. Urinary retention  With chronic Novak for the last 4 to 5 days day for weaning per urology can start low-dose Flomax patient denies history of prostate cancer but has had prostatitis in the past  4.   COPD not requiring oxygen continue IV Solu-Medrol  Wean as possible continue Pulmicort consider Brovana    Patient feels markedly improved continue treatment for another 2 to 3 days we will start physical therapy and OT therapy    Patient Active Problem List   Diagnosis Code    Syncope R55    Hypertension I10    Chronic obstructive pulmonary disease (HCC) J44.9    Parkinson's disease ([facility] [address] G20    COPD exacerbation ([facility] [address] J44.1    UTI (urinary tract infection) N39.0    Dyspnea R06.00    Sepsis ([address] A41.9    Urinary retention R33.9    Weakness generalized R53.1       Subjective:        Review of systems:    Constitutional: denies fevers, chills, myalgias  Respiratory: + SOB, cough  Cardiovascular: denies chest pain, palpitations  Gastrointestinal: denies nausea, vomiting, diarrhea      Vital signs/Intake and Output:  Visit Vitals  BP (!) 142/73 (BP 1 Location: Right arm, BP Patient Position: At rest)   Pulse (!) 134   Temp 97.2 °F (36.2 °C)   Resp 18   Ht 5' 7\" (1.702 m)   Wt 77.1 kg (170 lb)   SpO2 99%   BMI 26.63 kg/m²     Current Shift:  06/28 0701 - 06/28 1900  In: 300 [P.O.:300]  Out: 301 [Urine:300]  Last three shifts:  06/26 1901 - 06/28 0700  In: 1300 [I.V.:1300]  Out: 2750 [Urine:2750]    Exam:    General: Well developed, alert, NAD, OX3  Head/Neck: NCAT, supple, No masses, No lymphadenopathy  CVS:Regular rate and rhythm, no M/R/G, S1/S2 heard, no thrill tachycardia mild ectopy not atrial fibrillation lungs:Clear to auscultation bilaterally,+wheezes, rhonchi, or rales  Abdomen: Soft, Nontender, No distention, Normal Bowel sounds, No hepatomegaly Novak catheter in place  Extremities: No C/C/E, pulses palpable 2+  Skin:normal texture and turgor, no rashes, no lesions  Neuro:grossly normal , follows commands  Psych:appropriate                Labs: Results:       Chemistry Recent Labs     06/28/21  0220 06/27/21  1110   * 112*    133*   K 4.2 3.8    98*   CO2 23 27   BUN 17 19*   CREA 0.98 1.13   CA 7.2* 7.9*   AGAP 9 8   BUCR 17 17   AP  --  47   TP  --  5.7*   ALB  --  3.0*   GLOB  --  2.7   AGRAT  --  1.1      CBC w/Diff Recent Labs     06/28/21  0220 06/27/21  1110   WBC 8.6 9.6   RBC 4.39 5.17   HGB 13.7 15.9   HCT 40.8 48.1*    193   GRANS  --  74*   LYMPH  --  11*   EOS  --  1      Cardiac Enzymes Recent Labs     06/27/21  1110      CKND1 2.4      Coagulation No results for input(s): PTP, INR, APTT, INREXT in the last 72 hours. Lipid Panel No results found for: CHOL, CHOLPOCT, CHOLX, CHLST, CHOLV, 511688, HDL, HDLP, LDL, LDLC, DLDLP, 377475, VLDLC, VLDL, TGLX, TRIGL, TRIGP, TGLPOCT, CHHD, CHHDX   BNP No results for input(s): BNPP in the last 72 hours.    Liver Enzymes Recent Labs     06/27/21  1110   TP 5.7*   ALB 3.0*   AP 47 Thyroid Studies Lab Results   Component Value Date/Time    TSH 0.92 10/30/2017 02:16 AM        Procedures/imaging: see electronic medical records for all procedures/Xrays and details which were not copied into this note but were reviewed prior to creation of Hung Haas MD

## 2021-06-28 NOTE — PROGRESS NOTES
Problem: Falls - Risk of  Goal: *Absence of Falls  Description: Document Rylee Connor Fall Risk and appropriate interventions in the flowsheet.   Outcome: Progressing Towards Goal  Note: Fall Risk Interventions:  Mobility Interventions: Bed/chair exit alarm, Patient to call before getting OOB    Medication Interventions: Bed/chair exit alarm, Patient to call before getting OOB, Teach patient to arise slowly

## 2021-06-29 ENCOUNTER — APPOINTMENT (OUTPATIENT)
Dept: NON INVASIVE DIAGNOSTICS | Age: 76
DRG: 191 | End: 2021-06-29
Attending: INTERNAL MEDICINE
Payer: MEDICARE

## 2021-06-29 LAB
ANION GAP SERPL CALC-SCNC: 9 MMOL/L (ref 3–18)
BACTERIA SPEC CULT: NORMAL
BUN SERPL-MCNC: 23 MG/DL (ref 7–18)
BUN/CREAT SERPL: 23 (ref 12–20)
CALCIUM SERPL-MCNC: 7.1 MG/DL (ref 8.5–10.1)
CHLORIDE SERPL-SCNC: 104 MMOL/L (ref 100–111)
CO2 SERPL-SCNC: 27 MMOL/L (ref 21–32)
COVID-19 RAPID TEST, COVR: NOT DETECTED
CREAT SERPL-MCNC: 1.01 MG/DL (ref 0.6–1.3)
ECHO AO ROOT DIAM: 3.42 CM
ECHO AV AREA PEAK VELOCITY: 3.55 CM2
ECHO AV AREA VTI: 3.86 CM2
ECHO AV AREA/BSA PEAK VELOCITY: 1.9 CM2/M2
ECHO AV AREA/BSA VTI: 2 CM2/M2
ECHO AV MEAN GRADIENT: 5.07 MMHG
ECHO AV PEAK GRADIENT: 8.82 MMHG
ECHO AV PEAK VELOCITY: 148.53 CM/S
ECHO AV VTI: 25.96 CM
ECHO EST RA PRESSURE: 3 MMHG
ECHO IVC PROX: 1.92 CM
ECHO LA MAJOR AXIS: 2.64 CM
ECHO LA MINOR AXIS: 1.4 CM
ECHO LA VOL 2C: 21.61 ML (ref 18–58)
ECHO LA VOL 4C: 50.51 ML (ref 18–58)
ECHO LA VOL BP: 40.79 ML (ref 18–58)
ECHO LA VOL/BSA BIPLANE: 21.58 ML/M2 (ref 16–28)
ECHO LA VOLUME INDEX A2C: 11.43 ML/M2 (ref 16–28)
ECHO LA VOLUME INDEX A4C: 26.72 ML/M2 (ref 16–28)
ECHO LV E' LATERAL VELOCITY: 8 CM/S
ECHO LV E' SEPTAL VELOCITY: 8 CM/S
ECHO LV EDV A2C: 95.5 ML
ECHO LV EDV A4C: 110.47 ML
ECHO LV EDV BP: 106.02 ML (ref 67–155)
ECHO LV EDV INDEX A4C: 58.4 ML/M2
ECHO LV EDV INDEX BP: 56.1 ML/M2
ECHO LV EDV NDEX A2C: 50.5 ML/M2
ECHO LV EJECTION FRACTION A2C: 59 PERCENT
ECHO LV EJECTION FRACTION A4C: 58 PERCENT
ECHO LV EJECTION FRACTION BIPLANE: 56.4 PERCENT (ref 55–100)
ECHO LV ESV A2C: 39.36 ML
ECHO LV ESV A4C: 46.66 ML
ECHO LV ESV BP: 46.22 ML (ref 22–58)
ECHO LV ESV INDEX A2C: 20.8 ML/M2
ECHO LV ESV INDEX A4C: 24.7 ML/M2
ECHO LV ESV INDEX BP: 24.5 ML/M2
ECHO LV INTERNAL DIMENSION DIASTOLIC: 4.57 CM (ref 4.2–5.9)
ECHO LV INTERNAL DIMENSION SYSTOLIC: 3.35 CM
ECHO LV IVSD: 1.16 CM (ref 0.6–1)
ECHO LV MASS 2D: 177.1 G (ref 88–224)
ECHO LV MASS INDEX 2D: 93.7 G/M2 (ref 49–115)
ECHO LV POSTERIOR WALL DIASTOLIC: 1.02 CM (ref 0.6–1)
ECHO LVOT DIAM: 2.26 CM
ECHO LVOT PEAK GRADIENT: 6.96 MMHG
ECHO LVOT PEAK VELOCITY: 131.95 CM/S
ECHO LVOT SV: 100.3 ML
ECHO LVOT VTI: 25.08 CM
ECHO MV A VELOCITY: 70.71 CM/S
ECHO MV AREA PHT: 3.75 CM2
ECHO MV E DECELERATION TIME (DT): 202.23 MS
ECHO MV E VELOCITY: 54 CM/S
ECHO MV E/A RATIO: 0.76
ECHO MV E/E' LATERAL: 6.75
ECHO MV E/E' RATIO (AVERAGED): 6.75
ECHO MV E/E' SEPTAL: 6.75
ECHO MV PRESSURE HALF TIME (PHT): 58.65 MS
ECHO RA AREA 4C: 26.41 CM2
ECHO RV INTERNAL DIMENSION: 3.39 CM
ECHO RV TAPSE: 3 CM (ref 1.5–2)
ECHO TV REGURGITANT MAX VELOCITY: 224.51 CM/S
ECHO TV REGURGITANT PEAK GRADIENT: 20.16 MMHG
ERYTHROCYTE [DISTWIDTH] IN BLOOD BY AUTOMATED COUNT: 13.6 % (ref 11.6–14.5)
GLUCOSE BLD STRIP.AUTO-MCNC: 122 MG/DL (ref 70–110)
GLUCOSE BLD STRIP.AUTO-MCNC: 141 MG/DL (ref 70–110)
GLUCOSE BLD STRIP.AUTO-MCNC: 143 MG/DL (ref 70–110)
GLUCOSE BLD STRIP.AUTO-MCNC: 146 MG/DL (ref 70–110)
GLUCOSE BLD STRIP.AUTO-MCNC: 159 MG/DL (ref 70–110)
GLUCOSE BLD STRIP.AUTO-MCNC: 170 MG/DL (ref 70–110)
GLUCOSE SERPL-MCNC: 148 MG/DL (ref 74–99)
HCT VFR BLD AUTO: 38.2 % (ref 36–48)
HGB BLD-MCNC: 12.8 G/DL (ref 13–16)
LACTATE SERPL-SCNC: 3.6 MMOL/L (ref 0.4–2)
LACTATE SERPL-SCNC: 4.4 MMOL/L (ref 0.4–2)
LACTATE SERPL-SCNC: 6.1 MMOL/L (ref 0.4–2)
LACTATE SERPL-SCNC: 6.7 MMOL/L (ref 0.4–2)
LVOT MG: 3.83 MMHG
MCH RBC QN AUTO: 31 PG (ref 24–34)
MCHC RBC AUTO-ENTMCNC: 33.5 G/DL (ref 31–37)
MCV RBC AUTO: 92.5 FL (ref 74–97)
PLATELET # BLD AUTO: 204 K/UL (ref 135–420)
PMV BLD AUTO: 12.6 FL (ref 9.2–11.8)
POTASSIUM SERPL-SCNC: 3.3 MMOL/L (ref 3.5–5.5)
RBC # BLD AUTO: 4.13 M/UL (ref 4.35–5.65)
SERVICE CMNT-IMP: NORMAL
SODIUM SERPL-SCNC: 140 MMOL/L (ref 136–145)
SOURCE, COVRS: NORMAL
WBC # BLD AUTO: 17.2 K/UL (ref 4.6–13.2)

## 2021-06-29 PROCEDURE — 94640 AIRWAY INHALATION TREATMENT: CPT

## 2021-06-29 PROCEDURE — 97116 GAIT TRAINING THERAPY: CPT

## 2021-06-29 PROCEDURE — 77010033678 HC OXYGEN DAILY

## 2021-06-29 PROCEDURE — 74011000250 HC RX REV CODE- 250: Performed by: FAMILY MEDICINE

## 2021-06-29 PROCEDURE — 74011636637 HC RX REV CODE- 636/637: Performed by: FAMILY MEDICINE

## 2021-06-29 PROCEDURE — 85027 COMPLETE CBC AUTOMATED: CPT

## 2021-06-29 PROCEDURE — 87635 SARS-COV-2 COVID-19 AMP PRB: CPT

## 2021-06-29 PROCEDURE — 74011250636 HC RX REV CODE- 250/636: Performed by: FAMILY MEDICINE

## 2021-06-29 PROCEDURE — 83605 ASSAY OF LACTIC ACID: CPT

## 2021-06-29 PROCEDURE — 74011250636 HC RX REV CODE- 250/636: Performed by: INTERNAL MEDICINE

## 2021-06-29 PROCEDURE — 65660000000 HC RM CCU STEPDOWN

## 2021-06-29 PROCEDURE — 82962 GLUCOSE BLOOD TEST: CPT

## 2021-06-29 PROCEDURE — 80048 BASIC METABOLIC PNL TOTAL CA: CPT

## 2021-06-29 PROCEDURE — C8929 TTE W OR WO FOL WCON,DOPPLER: HCPCS

## 2021-06-29 PROCEDURE — 74011250637 HC RX REV CODE- 250/637: Performed by: FAMILY MEDICINE

## 2021-06-29 PROCEDURE — 74011000250 HC RX REV CODE- 250: Performed by: EMERGENCY MEDICINE

## 2021-06-29 PROCEDURE — 74011250636 HC RX REV CODE- 250/636: Performed by: EMERGENCY MEDICINE

## 2021-06-29 PROCEDURE — 36415 COLL VENOUS BLD VENIPUNCTURE: CPT

## 2021-06-29 RX ORDER — CALCIUM CARB/MAGNESIUM CARB 311-232MG
5 TABLET ORAL
Status: DISCONTINUED | OUTPATIENT
Start: 2021-06-29 | End: 2021-07-07 | Stop reason: HOSPADM

## 2021-06-29 RX ADMIN — BUDESONIDE 500 MCG: 0.5 INHALANT RESPIRATORY (INHALATION) at 20:55

## 2021-06-29 RX ADMIN — METHYLPREDNISOLONE SODIUM SUCCINATE 60 MG: 40 INJECTION, POWDER, FOR SOLUTION INTRAMUSCULAR; INTRAVENOUS at 17:36

## 2021-06-29 RX ADMIN — METHYLPREDNISOLONE SODIUM SUCCINATE 60 MG: 40 INJECTION, POWDER, FOR SOLUTION INTRAMUSCULAR; INTRAVENOUS at 13:41

## 2021-06-29 RX ADMIN — Medication 10 ML: at 13:42

## 2021-06-29 RX ADMIN — ENOXAPARIN SODIUM 40 MG: 40 INJECTION SUBCUTANEOUS at 10:13

## 2021-06-29 RX ADMIN — Medication 10 ML: at 21:27

## 2021-06-29 RX ADMIN — METHYLPREDNISOLONE SODIUM SUCCINATE 60 MG: 40 INJECTION, POWDER, FOR SOLUTION INTRAMUSCULAR; INTRAVENOUS at 06:00

## 2021-06-29 RX ADMIN — IPRATROPIUM BROMIDE AND ALBUTEROL SULFATE 3 ML: .5; 3 SOLUTION RESPIRATORY (INHALATION) at 11:25

## 2021-06-29 RX ADMIN — IPRATROPIUM BROMIDE AND ALBUTEROL SULFATE 3 ML: .5; 3 SOLUTION RESPIRATORY (INHALATION) at 17:36

## 2021-06-29 RX ADMIN — INSULIN LISPRO 2 UNITS: 100 INJECTION, SOLUTION INTRAVENOUS; SUBCUTANEOUS at 11:30

## 2021-06-29 RX ADMIN — PERFLUTREN 1 ML: 6.52 INJECTION, SUSPENSION INTRAVENOUS at 11:51

## 2021-06-29 RX ADMIN — METHYLPREDNISOLONE SODIUM SUCCINATE 60 MG: 40 INJECTION, POWDER, FOR SOLUTION INTRAMUSCULAR; INTRAVENOUS at 01:23

## 2021-06-29 RX ADMIN — AZITHROMYCIN MONOHYDRATE 500 MG: 500 INJECTION, POWDER, LYOPHILIZED, FOR SOLUTION INTRAVENOUS at 13:40

## 2021-06-29 RX ADMIN — BUDESONIDE 500 MCG: 0.5 INHALANT RESPIRATORY (INHALATION) at 07:12

## 2021-06-29 RX ADMIN — METHYLPREDNISOLONE SODIUM SUCCINATE 60 MG: 40 INJECTION, POWDER, FOR SOLUTION INTRAMUSCULAR; INTRAVENOUS at 06:37

## 2021-06-29 RX ADMIN — SODIUM CHLORIDE 75 ML/HR: 900 INJECTION, SOLUTION INTRAVENOUS at 10:13

## 2021-06-29 RX ADMIN — IPRATROPIUM BROMIDE AND ALBUTEROL SULFATE 3 ML: .5; 3 SOLUTION RESPIRATORY (INHALATION) at 07:12

## 2021-06-29 RX ADMIN — Medication 5 MG: at 22:50

## 2021-06-29 RX ADMIN — METHYLPREDNISOLONE SODIUM SUCCINATE 60 MG: 40 INJECTION, POWDER, FOR SOLUTION INTRAMUSCULAR; INTRAVENOUS at 23:36

## 2021-06-29 RX ADMIN — WATER 1 G: 1 INJECTION INTRAMUSCULAR; INTRAVENOUS; SUBCUTANEOUS at 23:52

## 2021-06-29 RX ADMIN — CEFTRIAXONE SODIUM 2 G: 2 INJECTION, POWDER, FOR SOLUTION INTRAMUSCULAR; INTRAVENOUS at 13:41

## 2021-06-29 RX ADMIN — METHYLPREDNISOLONE SODIUM SUCCINATE 60 MG: 40 INJECTION, POWDER, FOR SOLUTION INTRAMUSCULAR; INTRAVENOUS at 07:17

## 2021-06-29 RX ADMIN — Medication 10 ML: at 06:38

## 2021-06-29 RX ADMIN — IPRATROPIUM BROMIDE AND ALBUTEROL SULFATE 3 ML: .5; 3 SOLUTION RESPIRATORY (INHALATION) at 20:55

## 2021-06-29 RX ADMIN — INSULIN LISPRO 2 UNITS: 100 INJECTION, SOLUTION INTRAVENOUS; SUBCUTANEOUS at 21:27

## 2021-06-29 RX ADMIN — SODIUM CHLORIDE 1000 ML: 900 INJECTION, SOLUTION INTRAVENOUS at 23:37

## 2021-06-29 NOTE — PROGRESS NOTES
2334-Resting quietly in bed. Stable. White board updated. No complaints voiced at this time. 0123-Assessment complete. Stable. Pleasant. No complaint of pain. Call light and personal items within reach. 0451-Stable. Resting quietly in bed. No complaints voiced. 0600-Resting quietly in bed. Stable. No complaints voiced at this time. Call light within reach. Shift Summary:  Uneventful shift. Rested quietly. Stable at shift change report.

## 2021-06-29 NOTE — PROGRESS NOTES
0730: report given to this nurse from [place] [id number]    21 : transferred to ECHO dept  Saud Aguilar RN    1120: return from ECHO dept, MD Coats at the bedside with pt assessment and education on condition and tx, RT called to the bedside to give pt breathing tx  Saud Aguilar RN    1600: MD Coats made aware pt lactic acid level 6.7 from 3.6 this a.m, repeat lactic acid lab pending  Saud Aguilar RN

## 2021-06-29 NOTE — PROGRESS NOTES
Problem: Chronic Obstructive Pulmonary Disease (COPD)  Goal: *Oxygen saturation during activity within specified parameters  Outcome: Progressing Towards Goal  Goal: *Able to remain out of bed as prescribed  Outcome: Progressing Towards Goal  Goal: *Absence of hypoxia  Outcome: Progressing Towards Goal  Goal: *Optimize nutritional status  Outcome: Progressing Towards Goal     Problem: Patient Education: Go to Patient Education Activity  Goal: Patient/Family Education  Outcome: Progressing Towards Goal     Problem: Falls - Risk of  Goal: *Absence of Falls  Description: Document Christiano Fall Risk and appropriate interventions in the flowsheet.   Outcome: Progressing Towards Goal  Note: Fall Risk Interventions:  Mobility Interventions: Patient to call before getting OOB, Utilize walker, cane, or other assistive device, Communicate number of staff needed for ambulation/transfer         Medication Interventions: Evaluate medications/consider consulting pharmacy, Patient to call before getting OOB, Teach patient to arise slowly, Bed/chair exit alarm                   Problem: Patient Education: Go to Patient Education Activity  Goal: Patient/Family Education  Outcome: Progressing Towards Goal     Problem: Patient Education: Go to Patient Education Activity  Goal: Patient/Family Education  Outcome: Progressing Towards Goal     Problem: Patient Education: Go to Patient Education Activity  Goal: Patient/Family Education  Outcome: Progressing Towards Goal

## 2021-06-29 NOTE — ROUTINE PROCESS
Bedside and Verbal shift change report given to Denis Parmar RN (oncoming nurse) by Emani Tolentino RN (offgoing nurse). Report included the following information SBAR and Kardex.

## 2021-06-29 NOTE — ROUTINE PROCESS
Bedside and Verbal shift change report given to Gala Tovar RN (oncoming nurse) by Devi Jackson RN (offgoing nurse). Report included the following information SBAR and Kardex.

## 2021-06-29 NOTE — PROGRESS NOTES
Hospitalist Progress Note    Patient: Diallo Kruse MRN: [id number]  CSN: [id number]    YOB: 1945  Age: 78 y.o. Sex: male    DOA: 6/27/2021 LOS:  LOS: 2 days          Chief Complaint:      Assessment/Plan     42-year-old male past medical history of hypertension, COPD, Parkinson's and CVA who is being admitted for sepsis secondary to urinary source, urinary tract infection, COPD exacerbation and urinary retention.      Sepsis secondary to urinary source -with associated lactic acidosis and tachycardia and generalized weakness  Leukocytosis this morning with a white blood count of 17.2  Very abnormal UA, but urine culture negative  Blood cultures no growth to date  Continue IV hydration     Urinary tract infection-  Continue IV antibiotics because urine culture likely obtained after IV antibiotic started so not truly accurate     Urinary retention -  Patient has Novak in place, will not remove now, defer to urology     COPD exacerbation -  Patient given IV Solu-Medrol and duo nebs in ED with improvement  Continue IV Solu-Medrol  Order Pulmicort every 12 hours   Order duo nebs as needed  Chest x-ray unremarkable     Code Status: DNR/DNI      Power of : Laura Laguerre (son), [phone]  Disposition :  Patient Active Problem List   Diagnosis Code    Syncope R55    Hypertension I10    Chronic obstructive pulmonary disease ([address] J44.9    Parkinson's disease ([facility] [address] G20    COPD exacerbation ([facility] [address] J44.1    UTI (urinary tract infection) N39.0    Dyspnea R06.00    Sepsis ([address] A41.9    Urinary retention R33.9    Weakness generalized R53.1       Subjective:    Pt had episode of SOB and required breathing treatment.      Review of systems:    Constitutional: denies fevers, chills, myalgias  Respiratory: denies SOB, cough  Cardiovascular: denies chest pain, palpitations  Gastrointestinal: denies nausea, vomiting, diarrhea      Vital signs/Intake and Output:  Visit Vitals  BP (!) 122/58 (BP 1 Location: Right upper arm, BP Patient Position: At rest)   Pulse (!) 102   Temp 97.6 °F (36.4 °C)   Resp 18   Ht 5' 7\" (1.702 m)   Wt 77.1 kg (170 lb)   SpO2 94%   BMI 26.63 kg/m²     Current Shift:  No intake/output data recorded. Last three shifts:  06/27 1901 - 06/29 0700  In: 2386.7 [P.O.:780; I.V.:1606.7]  Out: 6601 [Urine:6600]    Exam:    General: Well developed, alert, NAD, OX3  Head/Neck: NCAT, supple, No masses, No lymphadenopathy  CVS:Regular rate and rhythm, no M/R/G, S1/S2 heard, no thrill  Lungs: Coarse breath sounds with expiratory wheezing  Abdomen: Soft, Nontender, No distention, Normal Bowel sounds, No hepatomegaly  Extremities: No C/C/E, pulses palpable 2+  Skin:normal texture and turgor, no rashes, no lesions  Neuro:grossly normal , follows commands  Psych:appropriate                Labs: Results:       Chemistry Recent Labs     06/29/21 0055 06/28/21 0220 06/27/21  1110   * 142* 112*    137 133*   K 3.3* 4.2 3.8    105 98*   CO2 27 23 27   BUN 23* 17 19*   CREA 1.01 0.98 1.13   CA 7.1* 7.2* 7.9*   AGAP 9 9 8   BUCR 23* 17 17   AP  --   --  47   TP  --   --  5.7*   ALB  --   --  3.0*   GLOB  --   --  2.7   AGRAT  --   --  1.1      CBC w/Diff Recent Labs     06/29/21  0055 06/28/21 0220 06/27/21  1110   WBC 17.2* 8.6 9.6   RBC 4.13* 4.39 5.17   HGB 12.8* 13.7 15.9   HCT 38.2 40.8 48.1*    171 193   GRANS  --   --  74*   LYMPH  --   --  11*   EOS  --   --  1      Cardiac Enzymes Recent Labs     06/27/21  1110      CKND1 2.4      Coagulation No results for input(s): PTP, INR, APTT, INREXT in the last 72 hours. Lipid Panel No results found for: CHOL, CHOLPOCT, CHOLX, CHLST, CHOLV, 020627, HDL, HDLP, LDL, LDLC, DLDLP, 291602, VLDLC, VLDL, TGLX, TRIGL, TRIGP, TGLPOCT, CHHD, CHHDX   BNP No results for input(s): BNPP in the last 72 hours.    Liver Enzymes Recent Labs     06/27/21  1110   TP 5.7*   ALB 3.0*   AP 47      Thyroid Studies Lab Results   Component Value Date/Time    TSH 0.92 10/30/2017 02:16 AM        Procedures/imaging: see electronic medical records for all procedures/Xrays and details which were not copied into this note but were reviewed prior to creation of Bruno Prakash MD

## 2021-06-29 NOTE — PROGRESS NOTES
Problem: Mobility Impaired (Adult and Pediatric)  Goal: *Acute Goals and Plan of Care (Insert Text)  Description: Physical Therapy Goals   Initiated 6/28/2021 and to be accomplished within 5-7 day(s)  1. Patient will move from supine <> sit with mod I in prep for out of bed activity and change of position. 2.  Patient will perform sit<> stand with mod I with LRAD in prep for transfers/ambulation. 3.  Patient will transfer from bed <> chair with mod I with LRAD for time up in chair for completion of ADL activity. 4.  Patient will ambulate 150 feet with S/LRAD for improved functional mobility at discharge. 5.  Patient will ascend/descend 3-5 stairs with handrail(s) with minimal assistance/contact guard assist for home re-entry as needed. Outcome: Progressing Towards Goal   PHYSICAL THERAPY TREATMENT    Patient: Ralph Romero (6 y.o. male)  Date: 6/29/2021  Diagnosis: Dyspnea [R06.00]  COPD (chronic obstructive pulmonary disease) ([facility]) [J44.9]  Sepsis ([facility] [address] [A41.9]  UTI (urinary tract infection) [N39.0] Sepsis ([facility] [address]    Precautions: Fall  PLOF: lives in a RV, ambulatory with a cane, has a RW    ASSESSMENT:  Pt very eager to participate, very happy and pleasant. No assistance needed for supine<>sit or sit<>stand. Ambulated with personal cane, reciprocal gt pattern, steady pace, no LOB or path deviations. SOB with activity, SpO2 97% on RA. Pt left supine in bed. Progression toward goals:   [x]      Improving appropriately and progressing toward goals  []      Improving slowly and progressing toward goals  []      Not making progress toward goals and plan of care will be adjusted     PLAN: stair nego next session  Patient continues to benefit from skilled intervention to address the above impairments. Continue treatment per established plan of care.   Discharge Recommendations:  None  Further Equipment Recommendations for Discharge:  N/A     SUBJECTIVE:   Patient stated I wish you were here early this morning.     OBJECTIVE DATA SUMMARY:   Critical Behavior:  Neurologic State: Alert, Appropriate for age, Eyes open spontaneously  Orientation Level: Oriented X4  Cognition: Follows commands, Appropriate for age attention/concentration  Safety/Judgement: Fall prevention  Functional Mobility Training:  Bed Mobility:    Supine to Sit: Independent  Sit to Supine: Independent  Transfers:  Sit to Stand: Supervision  Stand to Sit: Supervision  Balance:  Sitting: Intact  Standing: Intact; With support   Ambulation/Gait Training:  Distance (ft): 180 Feet (ft)  Assistive Device: Gait belt;Cane, straight  Ambulation - Level of Assistance: Modified independent        Pain:  Pain level pre-treatment: 0/10  Pain level post-treatment: 0/10   Pain Intervention(s): Medication (see MAR); Rest, Ice, Repositioning   Response to intervention: Nurse notified, See doc flow    Activity Tolerance:   Fair+  Please refer to the flowsheet for vital signs taken during this treatment. After treatment:   [] Patient left in no apparent distress sitting up in chair  [x] Patient left in no apparent distress in bed  [x] Call bell left within reach  [] Nursing notified  [] Caregiver present  [] Bed alarm activated  [] SCDs applied      COMMUNICATION/EDUCATION:   [x]         Role of Physical Therapy in the acute care setting. [x]         Fall prevention education was provided and the patient/caregiver indicated understanding. [x]         Patient/family have participated as able in working toward goals and plan of care. [x]         Patient/family agree to work toward stated goals and plan of care. []         Patient understands intent and goals of therapy, but is neutral about his/her participation.   []         Patient is unable to participate in stated goals/plan of care: ongoing with therapy staff.  []         Other:        Massiel Cox PTA   Time Calculation: 13 mins

## 2021-06-30 ENCOUNTER — APPOINTMENT (OUTPATIENT)
Dept: CT IMAGING | Age: 76
DRG: 191 | End: 2021-06-30
Attending: FAMILY MEDICINE
Payer: MEDICARE

## 2021-06-30 LAB
ANION GAP SERPL CALC-SCNC: 6 MMOL/L (ref 3–18)
ANION GAP SERPL CALC-SCNC: 7 MMOL/L (ref 3–18)
BNP SERPL-MCNC: 4899 PG/ML (ref 0–900)
BUN SERPL-MCNC: 25 MG/DL (ref 7–18)
BUN SERPL-MCNC: 26 MG/DL (ref 7–18)
BUN/CREAT SERPL: 24 (ref 12–20)
BUN/CREAT SERPL: 28 (ref 12–20)
CALCIUM SERPL-MCNC: 7 MG/DL (ref 8.5–10.1)
CALCIUM SERPL-MCNC: 7.2 MG/DL (ref 8.5–10.1)
CHLORIDE SERPL-SCNC: 107 MMOL/L (ref 100–111)
CHLORIDE SERPL-SCNC: 107 MMOL/L (ref 100–111)
CK MB CFR SERPL CALC: 3 % (ref 0–4)
CK MB SERPL-MCNC: 3.4 NG/ML (ref 5–25)
CK SERPL-CCNC: 113 U/L (ref 39–308)
CO2 SERPL-SCNC: 28 MMOL/L (ref 21–32)
CO2 SERPL-SCNC: 29 MMOL/L (ref 21–32)
CREAT SERPL-MCNC: 0.92 MG/DL (ref 0.6–1.3)
CREAT SERPL-MCNC: 1.04 MG/DL (ref 0.6–1.3)
ERYTHROCYTE [DISTWIDTH] IN BLOOD BY AUTOMATED COUNT: 13.8 % (ref 11.6–14.5)
GLUCOSE BLD STRIP.AUTO-MCNC: 101 MG/DL (ref 70–110)
GLUCOSE BLD STRIP.AUTO-MCNC: 134 MG/DL (ref 70–110)
GLUCOSE BLD STRIP.AUTO-MCNC: 148 MG/DL (ref 70–110)
GLUCOSE BLD STRIP.AUTO-MCNC: 161 MG/DL (ref 70–110)
GLUCOSE SERPL-MCNC: 116 MG/DL (ref 74–99)
GLUCOSE SERPL-MCNC: 131 MG/DL (ref 74–99)
HCT VFR BLD AUTO: 34.9 % (ref 36–48)
HGB BLD-MCNC: 11.3 G/DL (ref 13–16)
LACTATE SERPL-SCNC: 2.1 MMOL/L (ref 0.4–2)
LACTATE SERPL-SCNC: 2.6 MMOL/L (ref 0.4–2)
LACTATE SERPL-SCNC: 3 MMOL/L (ref 0.4–2)
LACTATE SERPL-SCNC: 3.1 MMOL/L (ref 0.4–2)
LACTATE SERPL-SCNC: 4.6 MMOL/L (ref 0.4–2)
LACTATE SERPL-SCNC: 5.4 MMOL/L (ref 0.4–2)
MCH RBC QN AUTO: 30.4 PG (ref 24–34)
MCHC RBC AUTO-ENTMCNC: 32.4 G/DL (ref 31–37)
MCV RBC AUTO: 93.8 FL (ref 74–97)
PLATELET # BLD AUTO: 188 K/UL (ref 135–420)
PMV BLD AUTO: 12.1 FL (ref 9.2–11.8)
POTASSIUM SERPL-SCNC: 3.5 MMOL/L (ref 3.5–5.5)
POTASSIUM SERPL-SCNC: 3.6 MMOL/L (ref 3.5–5.5)
RBC # BLD AUTO: 3.72 M/UL (ref 4.35–5.65)
SODIUM SERPL-SCNC: 142 MMOL/L (ref 136–145)
SODIUM SERPL-SCNC: 142 MMOL/L (ref 136–145)
TROPONIN I SERPL-MCNC: 0.06 NG/ML (ref 0–0.04)
WBC # BLD AUTO: 14.5 K/UL (ref 4.6–13.2)

## 2021-06-30 PROCEDURE — 74011250636 HC RX REV CODE- 250/636: Performed by: EMERGENCY MEDICINE

## 2021-06-30 PROCEDURE — 71275 CT ANGIOGRAPHY CHEST: CPT

## 2021-06-30 PROCEDURE — 97116 GAIT TRAINING THERAPY: CPT

## 2021-06-30 PROCEDURE — 36415 COLL VENOUS BLD VENIPUNCTURE: CPT

## 2021-06-30 PROCEDURE — 74011000250 HC RX REV CODE- 250: Performed by: FAMILY MEDICINE

## 2021-06-30 PROCEDURE — 83605 ASSAY OF LACTIC ACID: CPT

## 2021-06-30 PROCEDURE — 94640 AIRWAY INHALATION TREATMENT: CPT

## 2021-06-30 PROCEDURE — 93005 ELECTROCARDIOGRAM TRACING: CPT

## 2021-06-30 PROCEDURE — 74011000636 HC RX REV CODE- 636: Performed by: FAMILY MEDICINE

## 2021-06-30 PROCEDURE — 83880 ASSAY OF NATRIURETIC PEPTIDE: CPT

## 2021-06-30 PROCEDURE — 74011636637 HC RX REV CODE- 636/637: Performed by: FAMILY MEDICINE

## 2021-06-30 PROCEDURE — 74011250636 HC RX REV CODE- 250/636: Performed by: FAMILY MEDICINE

## 2021-06-30 PROCEDURE — 80048 BASIC METABOLIC PNL TOTAL CA: CPT

## 2021-06-30 PROCEDURE — 85027 COMPLETE CBC AUTOMATED: CPT

## 2021-06-30 PROCEDURE — 82962 GLUCOSE BLOOD TEST: CPT

## 2021-06-30 PROCEDURE — 74011250637 HC RX REV CODE- 250/637: Performed by: UROLOGY

## 2021-06-30 PROCEDURE — 65660000000 HC RM CCU STEPDOWN

## 2021-06-30 PROCEDURE — 74011250637 HC RX REV CODE- 250/637

## 2021-06-30 PROCEDURE — 82553 CREATINE MB FRACTION: CPT

## 2021-06-30 PROCEDURE — 74011250637 HC RX REV CODE- 250/637: Performed by: FAMILY MEDICINE

## 2021-06-30 RX ORDER — FUROSEMIDE 10 MG/ML
INJECTION INTRAMUSCULAR; INTRAVENOUS
Status: DISPENSED
Start: 2021-06-30 | End: 2021-07-01

## 2021-06-30 RX ORDER — FUROSEMIDE 10 MG/ML
40 INJECTION INTRAMUSCULAR; INTRAVENOUS ONCE
Status: COMPLETED | OUTPATIENT
Start: 2021-06-30 | End: 2021-06-30

## 2021-06-30 RX ORDER — ALBUMIN HUMAN 250 G/1000ML
25 SOLUTION INTRAVENOUS ONCE
Status: COMPLETED | OUTPATIENT
Start: 2021-07-01 | End: 2021-07-01

## 2021-06-30 RX ORDER — NITROGLYCERIN 0.4 MG/1
0.4 TABLET SUBLINGUAL AS NEEDED
Status: DISCONTINUED | OUTPATIENT
Start: 2021-06-30 | End: 2021-07-07 | Stop reason: HOSPADM

## 2021-06-30 RX ORDER — NITROGLYCERIN 0.4 MG/1
TABLET SUBLINGUAL
Status: COMPLETED
Start: 2021-06-30 | End: 2021-06-30

## 2021-06-30 RX ORDER — TAMSULOSIN HYDROCHLORIDE 0.4 MG/1
0.4 CAPSULE ORAL DAILY
Status: DISCONTINUED | OUTPATIENT
Start: 2021-06-30 | End: 2021-07-07 | Stop reason: HOSPADM

## 2021-06-30 RX ADMIN — METHYLPREDNISOLONE SODIUM SUCCINATE 125 MG: 40 INJECTION, POWDER, FOR SOLUTION INTRAMUSCULAR; INTRAVENOUS at 23:12

## 2021-06-30 RX ADMIN — NITROGLYCERIN 0.4 MG: 0.4 TABLET, ORALLY DISINTEGRATING SUBLINGUAL at 18:01

## 2021-06-30 RX ADMIN — IOPAMIDOL 100 ML: 755 INJECTION, SOLUTION INTRAVENOUS at 15:41

## 2021-06-30 RX ADMIN — Medication 10 ML: at 06:30

## 2021-06-30 RX ADMIN — CARBAMIDE PEROXIDE 6.5% 5 DROP: 6.5 LIQUID AURICULAR (OTIC) at 23:22

## 2021-06-30 RX ADMIN — FLUTICASONE PROPIONATE 2 SPRAY: 50 SPRAY, METERED NASAL at 12:01

## 2021-06-30 RX ADMIN — INSULIN LISPRO 2 UNITS: 100 INJECTION, SOLUTION INTRAVENOUS; SUBCUTANEOUS at 12:01

## 2021-06-30 RX ADMIN — BUDESONIDE 500 MCG: 0.5 INHALANT RESPIRATORY (INHALATION) at 20:04

## 2021-06-30 RX ADMIN — WATER 1 G: 1 INJECTION INTRAMUSCULAR; INTRAVENOUS; SUBCUTANEOUS at 16:40

## 2021-06-30 RX ADMIN — IPRATROPIUM BROMIDE AND ALBUTEROL SULFATE 3 ML: .5; 3 SOLUTION RESPIRATORY (INHALATION) at 07:16

## 2021-06-30 RX ADMIN — METHYLPREDNISOLONE SODIUM SUCCINATE 60 MG: 40 INJECTION, POWDER, FOR SOLUTION INTRAMUSCULAR; INTRAVENOUS at 06:30

## 2021-06-30 RX ADMIN — ENOXAPARIN SODIUM 40 MG: 40 INJECTION SUBCUTANEOUS at 08:01

## 2021-06-30 RX ADMIN — AZITHROMYCIN MONOHYDRATE 500 MG: 500 INJECTION, POWDER, LYOPHILIZED, FOR SOLUTION INTRAVENOUS at 12:01

## 2021-06-30 RX ADMIN — WATER 1 G: 1 INJECTION INTRAMUSCULAR; INTRAVENOUS; SUBCUTANEOUS at 08:01

## 2021-06-30 RX ADMIN — NITROGLYCERIN 0.4 MG: 0.4 TABLET SUBLINGUAL at 18:01

## 2021-06-30 RX ADMIN — METHYLPREDNISOLONE SODIUM SUCCINATE 60 MG: 40 INJECTION, POWDER, FOR SOLUTION INTRAMUSCULAR; INTRAVENOUS at 12:00

## 2021-06-30 RX ADMIN — FUROSEMIDE 40 MG: 10 INJECTION, SOLUTION INTRAMUSCULAR; INTRAVENOUS at 18:13

## 2021-06-30 RX ADMIN — Medication 10 ML: at 14:00

## 2021-06-30 RX ADMIN — BUDESONIDE 500 MCG: 0.5 INHALANT RESPIRATORY (INHALATION) at 07:16

## 2021-06-30 RX ADMIN — IPRATROPIUM BROMIDE AND ALBUTEROL SULFATE 3 ML: .5; 3 SOLUTION RESPIRATORY (INHALATION) at 18:04

## 2021-06-30 RX ADMIN — TAMSULOSIN HYDROCHLORIDE 0.4 MG: 0.4 CAPSULE ORAL at 23:12

## 2021-06-30 RX ADMIN — Medication 10 ML: at 23:14

## 2021-06-30 RX ADMIN — WATER 1 G: 1 INJECTION INTRAMUSCULAR; INTRAVENOUS; SUBCUTANEOUS at 23:12

## 2021-06-30 NOTE — PROGRESS NOTES
Bedside shift change report given to Natasha Hughes (oncoming nurse) by Elsi Sands RN (offgoing nurse). Report included the following information SBAR, Kardex and Recent Results.

## 2021-06-30 NOTE — PROGRESS NOTES
Rising lactic acid. Broadened from rocephin to cefepime and ordered fluid bolus/increased fluid rate. Will trend.

## 2021-06-30 NOTE — PROGRESS NOTES
Hospitalist Progress Note    Patient: Willie Hunt MRN: [id number]  CSN: [id number]    YOB: 1945  Age: 78 y.o.   Sex: male    DOA: 6/27/2021 LOS:  LOS: 3 days          Chief Complaint:      Assessment/Plan     81-year-old male past medical history of hypertension, COPD, Parkinson's and CVA who is being admitted for sepsis secondary to urinary source, urinary tract infection, COPD exacerbation and urinary retention.      Sepsis secondary to urinary source -with associated lactic acidosis and tachycardia and generalized weakness  Leukocytosis improving your white blood count dropped from 17.2-14.5  Very abnormal UA, but urine culture negative  Blood cultures no growth to date  Continue IV hydration   Lactic acid rise overnight IV antibiotic spectrum broadened, will continue to trend lactic acid levels     Urinary tract infection-  Continue IV antibiotics because urine culture likely obtained after IV antibiotic started so not truly accurate     Urinary retention -  Patient has Novak in place, will not remove now  Spoke to Dr. Francisco Monteiro, placed urology consult     COPD exacerbation -  Patient given IV Solu-Medrol and duo nebs in ED with improvement  Continue IV Solu-Medrol  Order Pulmicort every 12 hours   Order duo nebs as needed  Chest x-ray unremarkable  Patient pulmonary situation not improving as expected, creatinine normal so will obtain CTA chest, increase Solu-Medrol dose from 60 mg IV every 6 to 125 mg IV every 8, consider pulmonary consult     Code Status: DNR/DNI      Power of : John Horne (son), [phone]  Disposition :  Patient Active Problem List   Diagnosis Code    Syncope R55    Hypertension I10    Chronic obstructive pulmonary disease ([address] J44.9    Parkinson's disease ([address] G20    COPD exacerbation ([address] J44.1    UTI (urinary tract infection) N39.0    Dyspnea R06.00    Sepsis ([address] A41.9    Urinary retention R33.9    Weakness generalized R53.1 Subjective:    Patient very short of breath and wheezy with talking and with exertion. He says every time he has emotions he wheezes. Review of systems:    Constitutional: denies fevers, chills, myalgias  Respiratory: Positive shortness of breath, cough and wheezing   Cardiovascular: denies chest pain, palpitations  Gastrointestinal: denies nausea, vomiting, diarrhea      Vital signs/Intake and Output:  Visit Vitals  BP (!) 154/74 (BP 1 Location: Right upper arm, BP Patient Position: At rest;Supine)   Pulse (!) 107   Temp 97.6 °F (36.4 °C)   Resp 22   Ht 5' 7\" (1.702 m)   Wt 77.1 kg (170 lb)   SpO2 100%   BMI 26.63 kg/m²     Current Shift:  06/30 0701 - 06/30 1900  In: 10 [I.V.:10]  Out: -   Last three shifts:  06/28 1901 - 06/30 0700  In: 4556 [P.O.:960; I.V.:3596]  Out: 5100 [Urine:5100]    Exam:    General: Well developed, alert, NAD, OX3  Head/Neck: NCAT, supple, No masses, No lymphadenopathy  CVS:Regular rate and rhythm, no M/R/G, S1/S2 heard, no thrill  Lungs: Coarse breath sounds with expiratory wheezing  Abdomen: Soft, Nontender, No distention, Normal Bowel sounds, No hepatomegaly  Extremities: No C/C/E, pulses palpable 2+  Skin:normal texture and turgor, no rashes, no lesions  Neuro:grossly normal , follows commands  Psych:appropriate                Labs: Results:       Chemistry Recent Labs     06/30/21 0200 06/29/21 0055 06/28/21 0220   * 148* 142*    140 137   K 3.5 3.3* 4.2    104 105   CO2 28 27 23   BUN 26* 23* 17   CREA 0.92 1.01 0.98   CA 7.0* 7.1* 7.2*   AGAP 7 9 9   BUCR 28* 23* 17      CBC w/Diff Recent Labs     06/30/21  0200 06/29/21 0055 06/28/21 0220   WBC 14.5* 17.2* 8.6   RBC 3.72* 4.13* 4.39   HGB 11.3* 12.8* 13.7   HCT 34.9* 38.2 40.8    204 171      Cardiac Enzymes No results for input(s): CPK, CKND1, RITA in the last 72 hours.     No lab exists for component: CKRMB, TROIP   Coagulation No results for input(s): PTP, INR, APTT, INREXT, INREXT in the last 72 hours. Lipid Panel No results found for: CHOL, CHOLPOCT, CHOLX, CHLST, CHOLV, 372332, HDL, HDLP, LDL, LDLC, DLDLP, 999168, VLDLC, VLDL, TGLX, TRIGL, TRIGP, TGLPOCT, CHHD, CHHDX   BNP No results for input(s): BNPP in the last 72 hours. Liver Enzymes No results for input(s): TP, ALB, TBIL, AP in the last 72 hours.     No lab exists for component: SGOT, GPT, DBIL   Thyroid Studies Lab Results   Component Value Date/Time    TSH 0.92 10/30/2017 02:16 AM        Procedures/imaging: see electronic medical records for all procedures/Xrays and details which were not copied into this note but were reviewed prior to creation of Ana Maria Savage MD

## 2021-06-30 NOTE — PROGRESS NOTES
Problem: Mobility Impaired (Adult and Pediatric)  Goal: *Acute Goals and Plan of Care (Insert Text)  Description: Physical Therapy Goals   Initiated 6/28/2021 and to be accomplished within 5-7 day(s)  1. Patient will move from supine <> sit with mod I in prep for out of bed activity and change of position. 2.  Patient will perform sit<> stand with mod I with LRAD in prep for transfers/ambulation. 3.  Patient will transfer from bed <> chair with mod I with LRAD for time up in chair for completion of ADL activity. 4.  Patient will ambulate 150 feet with S/LRAD for improved functional mobility at discharge. 5.  Patient will ascend/descend 3-5 stairs with handrail(s) with minimal assistance/contact guard assist for home re-entry as needed. PHYSICAL THERAPY TREATMENT    Patient: Kellie Bueno (89 y.o. male)  Date: 6/30/2021  Diagnosis: Dyspnea [R06.00]  COPD (chronic obstructive pulmonary disease) ([facility]) [J44.9]  Sepsis ([facility] [address] [A41.9]  UTI (urinary tract infection) [N39.0] Sepsis ([facility] [address]    Precautions: Fall  PLOF: lives in a RV, ambulatory with a cane, has a Rollator    ASSESSMENT:  Pt very wheezy this session and coughing. No assistance needed for bed mobility or sit to stand. Ambulated 180ft with personal cane, steady pace, no LOB or path deviations.  bpm and SpO2 99% on RA s/p ambulation. Pt left supine in bed, notified nurse. Progression toward goals:   [x]      Improving appropriately and progressing toward goals  []      Improving slowly and progressing toward goals  []      Not making progress toward goals and plan of care will be adjusted     PLAN:  Patient continues to benefit from skilled intervention to address the above impairments. Continue treatment per established plan of care.   Discharge Recommendations:  Home Health  Further Equipment Recommendations for Discharge:  N/A     SUBJECTIVE:   Patient stated I wish you were here after breakfast.    OBJECTIVE DATA SUMMARY: Critical Behavior:  Neurologic State: Alert, Eyes open spontaneously  Orientation Level: Oriented X4  Cognition: Appropriate decision making, Appropriate for age attention/concentration  Safety/Judgement: Fall prevention  Functional Mobility Training:  Bed Mobility:    Supine to Sit: Independent  Sit to Supine: Independent  Transfers:  Sit to Stand: Supervision  Stand to Sit: Supervision  Balance:  Sitting: Intact  Standing: Intact; With support   Ambulation/Gait Training:  Distance (ft): 180 Feet (ft)  Assistive Device: Gait belt;Cane, straight  Ambulation - Level of Assistance: Modified independent        Pain:  Pain level pre-treatment: 0/10  Pain level post-treatment: 0/10   Pain Intervention(s): Medication (see MAR); Rest, Ice, Repositioning   Response to intervention: Nurse notified, See doc flow    Activity Tolerance:   Fair  Please refer to the flowsheet for vital signs taken during this treatment. After treatment:   [] Patient left in no apparent distress sitting up in chair  [x] Patient left in no apparent distress in bed  [x] Call bell left within reach  [x] Nursing notified  [] Caregiver present  [] Bed alarm activated  [] SCDs applied      COMMUNICATION/EDUCATION:   [x]         Role of Physical Therapy in the acute care setting. [x]         Fall prevention education was provided and the patient/caregiver indicated understanding. [x]         Patient/family have participated as able in working toward goals and plan of care. [x]         Patient/family agree to work toward stated goals and plan of care. []         Patient understands intent and goals of therapy, but is neutral about his/her participation.   []         Patient is unable to participate in stated goals/plan of care: ongoing with therapy staff.  []         Other:        Lindsey Madrigal PTA   Time Calculation: 15 mins     Outcome: Progressing Towards Goal

## 2021-06-30 NOTE — PROGRESS NOTES
Problem: Chronic Obstructive Pulmonary Disease (COPD)  Goal: *Oxygen saturation during activity within specified parameters  Outcome: Progressing Towards Goal  Goal: *Able to remain out of bed as prescribed  Outcome: Progressing Towards Goal  Goal: *Absence of hypoxia  Outcome: Progressing Towards Goal  Goal: *Optimize nutritional status  Outcome: Progressing Towards Goal     Problem: Patient Education: Go to Patient Education Activity  Goal: Patient/Family Education  Outcome: Progressing Towards Goal     Problem: Falls - Risk of  Goal: *Absence of Falls  Description: Document Christiano Fall Risk and appropriate interventions in the flowsheet.   Outcome: Progressing Towards Goal  Note: Fall Risk Interventions:  Mobility Interventions: Patient to call before getting OOB, Utilize walker, cane, or other assistive device         Medication Interventions: Evaluate medications/consider consulting pharmacy, Patient to call before getting OOB, Teach patient to arise slowly                   Problem: Patient Education: Go to Patient Education Activity  Goal: Patient/Family Education  Outcome: Progressing Towards Goal     Problem: Patient Education: Go to Patient Education Activity  Goal: Patient/Family Education  Outcome: Progressing Towards Goal     Problem: Patient Education: Go to Patient Education Activity  Goal: Patient/Family Education  Outcome: Progressing Towards Goal

## 2021-06-30 NOTE — PROGRESS NOTES
0730: report given to this nurse from [place] [address]    [phone]: pt c/o severe chest pain, MD Mike Saurez paged and gives n/o orders, see kardex  Shell Loya RN    1800: cardiac enzymes drawn, nitro given, ekg complete, vitals obtained  FANNY Thomas RN      1071: md at bedside, n/o for furosemide noted, vitals reviewed, MD assess pt, see vital signs, pt makes known chest pain subsides  [address]    62: furosemide given  [address] [place]: pt voices he feels no chest pain at this time, no dyspnea noted, continue to monitor pt for any change  Shell Loya RN

## 2021-07-01 LAB
ALBUMIN SERPL-MCNC: 2.8 G/DL (ref 3.4–5)
ALBUMIN/GLOB SERPL: 1.3 {RATIO} (ref 0.8–1.7)
ALP SERPL-CCNC: 37 U/L (ref 45–117)
ALT SERPL-CCNC: 27 U/L (ref 16–61)
ANION GAP SERPL CALC-SCNC: 6 MMOL/L (ref 3–18)
ANION GAP SERPL CALC-SCNC: 7 MMOL/L (ref 3–18)
AST SERPL-CCNC: 17 U/L (ref 10–38)
ATRIAL RATE: 109 BPM
BASOPHILS # BLD: 0 K/UL (ref 0–0.1)
BASOPHILS NFR BLD: 0 % (ref 0–2)
BILIRUB SERPL-MCNC: 0.4 MG/DL (ref 0.2–1)
BNP SERPL-MCNC: 5846 PG/ML (ref 0–900)
BUN SERPL-MCNC: 26 MG/DL (ref 7–18)
BUN SERPL-MCNC: 27 MG/DL (ref 7–18)
BUN/CREAT SERPL: 21 (ref 12–20)
BUN/CREAT SERPL: 25 (ref 12–20)
CA-I SERPL-SCNC: 1.04 MMOL/L (ref 1.12–1.32)
CALCIUM SERPL-MCNC: 7 MG/DL (ref 8.5–10.1)
CALCIUM SERPL-MCNC: 7.5 MG/DL (ref 8.5–10.1)
CALCULATED P AXIS, ECG09: 75 DEGREES
CALCULATED R AXIS, ECG10: 68 DEGREES
CALCULATED T AXIS, ECG11: 18 DEGREES
CHLORIDE SERPL-SCNC: 102 MMOL/L (ref 100–111)
CHLORIDE SERPL-SCNC: 104 MMOL/L (ref 100–111)
CO2 SERPL-SCNC: 29 MMOL/L (ref 21–32)
CO2 SERPL-SCNC: 32 MMOL/L (ref 21–32)
CREAT SERPL-MCNC: 1.1 MG/DL (ref 0.6–1.3)
CREAT SERPL-MCNC: 1.25 MG/DL (ref 0.6–1.3)
DIAGNOSIS, 93000: NORMAL
DIFFERENTIAL METHOD BLD: ABNORMAL
EOSINOPHIL # BLD: 0 K/UL (ref 0–0.4)
EOSINOPHIL NFR BLD: 0 % (ref 0–5)
ERYTHROCYTE [DISTWIDTH] IN BLOOD BY AUTOMATED COUNT: 14.1 % (ref 11.6–14.5)
GLOBULIN SER CALC-MCNC: 2.2 G/DL (ref 2–4)
GLUCOSE BLD STRIP.AUTO-MCNC: 135 MG/DL (ref 70–110)
GLUCOSE BLD STRIP.AUTO-MCNC: 142 MG/DL (ref 70–110)
GLUCOSE BLD STRIP.AUTO-MCNC: 167 MG/DL (ref 70–110)
GLUCOSE BLD STRIP.AUTO-MCNC: 176 MG/DL (ref 70–110)
GLUCOSE SERPL-MCNC: 126 MG/DL (ref 74–99)
GLUCOSE SERPL-MCNC: 139 MG/DL (ref 74–99)
HCT VFR BLD AUTO: 32.7 % (ref 36–48)
HGB BLD-MCNC: 10.9 G/DL (ref 13–16)
LACTATE SERPL-SCNC: 1.7 MMOL/L (ref 0.4–2)
LACTATE SERPL-SCNC: 3.9 MMOL/L (ref 0.4–2)
LACTATE SERPL-SCNC: 5.2 MMOL/L (ref 0.4–2)
LYMPHOCYTES # BLD: 0.8 K/UL (ref 0.9–3.6)
LYMPHOCYTES NFR BLD: 7 % (ref 21–52)
MAGNESIUM SERPL-MCNC: 2.1 MG/DL (ref 1.6–2.6)
MCH RBC QN AUTO: 31.2 PG (ref 24–34)
MCHC RBC AUTO-ENTMCNC: 33.3 G/DL (ref 31–37)
MCV RBC AUTO: 93.7 FL (ref 74–97)
MONOCYTES # BLD: 0.7 K/UL (ref 0.05–1.2)
MONOCYTES NFR BLD: 6 % (ref 3–10)
NEUTS SEG # BLD: 10.7 K/UL (ref 1.8–8)
NEUTS SEG NFR BLD: 85 % (ref 40–73)
P-R INTERVAL, ECG05: 130 MS
PHOSPHATE SERPL-MCNC: 3.9 MG/DL (ref 2.5–4.9)
PLATELET # BLD AUTO: 183 K/UL (ref 135–420)
PMV BLD AUTO: 12.5 FL (ref 9.2–11.8)
POTASSIUM SERPL-SCNC: 3.3 MMOL/L (ref 3.5–5.5)
POTASSIUM SERPL-SCNC: 3.5 MMOL/L (ref 3.5–5.5)
PROT SERPL-MCNC: 5 G/DL (ref 6.4–8.2)
Q-T INTERVAL, ECG07: 360 MS
QRS DURATION, ECG06: 84 MS
QTC CALCULATION (BEZET), ECG08: 484 MS
RBC # BLD AUTO: 3.49 M/UL (ref 4.35–5.65)
SODIUM SERPL-SCNC: 140 MMOL/L (ref 136–145)
SODIUM SERPL-SCNC: 140 MMOL/L (ref 136–145)
VENTRICULAR RATE, ECG03: 109 BPM
WBC # BLD AUTO: 12.6 K/UL (ref 4.6–13.2)

## 2021-07-01 PROCEDURE — 74011000250 HC RX REV CODE- 250: Performed by: FAMILY MEDICINE

## 2021-07-01 PROCEDURE — 84100 ASSAY OF PHOSPHORUS: CPT

## 2021-07-01 PROCEDURE — 74011250636 HC RX REV CODE- 250/636: Performed by: EMERGENCY MEDICINE

## 2021-07-01 PROCEDURE — 83880 ASSAY OF NATRIURETIC PEPTIDE: CPT

## 2021-07-01 PROCEDURE — 77010033678 HC OXYGEN DAILY

## 2021-07-01 PROCEDURE — 74011250636 HC RX REV CODE- 250/636: Performed by: HOSPITALIST

## 2021-07-01 PROCEDURE — 83605 ASSAY OF LACTIC ACID: CPT

## 2021-07-01 PROCEDURE — 97116 GAIT TRAINING THERAPY: CPT

## 2021-07-01 PROCEDURE — 74011250637 HC RX REV CODE- 250/637: Performed by: INTERNAL MEDICINE

## 2021-07-01 PROCEDURE — 83735 ASSAY OF MAGNESIUM: CPT

## 2021-07-01 PROCEDURE — 74011250637 HC RX REV CODE- 250/637: Performed by: HOSPITALIST

## 2021-07-01 PROCEDURE — 94640 AIRWAY INHALATION TREATMENT: CPT

## 2021-07-01 PROCEDURE — 74011250636 HC RX REV CODE- 250/636: Performed by: FAMILY MEDICINE

## 2021-07-01 PROCEDURE — 85025 COMPLETE CBC W/AUTO DIFF WBC: CPT

## 2021-07-01 PROCEDURE — P9047 ALBUMIN (HUMAN), 25%, 50ML: HCPCS | Performed by: FAMILY MEDICINE

## 2021-07-01 PROCEDURE — 74011636637 HC RX REV CODE- 636/637: Performed by: FAMILY MEDICINE

## 2021-07-01 PROCEDURE — 36415 COLL VENOUS BLD VENIPUNCTURE: CPT

## 2021-07-01 PROCEDURE — 74011250636 HC RX REV CODE- 250/636: Performed by: INTERNAL MEDICINE

## 2021-07-01 PROCEDURE — 80053 COMPREHEN METABOLIC PANEL: CPT

## 2021-07-01 PROCEDURE — 65660000000 HC RM CCU STEPDOWN

## 2021-07-01 PROCEDURE — 82330 ASSAY OF CALCIUM: CPT

## 2021-07-01 PROCEDURE — 74011250637 HC RX REV CODE- 250/637: Performed by: FAMILY MEDICINE

## 2021-07-01 PROCEDURE — 82962 GLUCOSE BLOOD TEST: CPT

## 2021-07-01 RX ORDER — POTASSIUM CHLORIDE 20 MEQ/1
40 TABLET, EXTENDED RELEASE ORAL EVERY 4 HOURS
Status: COMPLETED | OUTPATIENT
Start: 2021-07-01 | End: 2021-07-01

## 2021-07-01 RX ORDER — POTASSIUM CHLORIDE 20 MEQ/1
40 TABLET, EXTENDED RELEASE ORAL
Status: ACTIVE | OUTPATIENT
Start: 2021-07-01 | End: 2021-07-02

## 2021-07-01 RX ORDER — SODIUM CHLORIDE 9 MG/ML
50 INJECTION, SOLUTION INTRAVENOUS CONTINUOUS
Status: DISCONTINUED | OUTPATIENT
Start: 2021-07-01 | End: 2021-07-02

## 2021-07-01 RX ORDER — GUAIFENESIN 600 MG/1
600 TABLET, EXTENDED RELEASE ORAL EVERY 12 HOURS
Status: DISCONTINUED | OUTPATIENT
Start: 2021-07-01 | End: 2021-07-07 | Stop reason: HOSPADM

## 2021-07-01 RX ADMIN — SODIUM CHLORIDE 50 ML/HR: 900 INJECTION, SOLUTION INTRAVENOUS at 22:00

## 2021-07-01 RX ADMIN — BUDESONIDE 500 MCG: 0.5 INHALANT RESPIRATORY (INHALATION) at 20:00

## 2021-07-01 RX ADMIN — BUDESONIDE 500 MCG: 0.5 INHALANT RESPIRATORY (INHALATION) at 07:31

## 2021-07-01 RX ADMIN — POTASSIUM CHLORIDE 40 MEQ: 1500 TABLET, EXTENDED RELEASE ORAL at 22:26

## 2021-07-01 RX ADMIN — ENOXAPARIN SODIUM 40 MG: 40 INJECTION SUBCUTANEOUS at 08:16

## 2021-07-01 RX ADMIN — METHYLPREDNISOLONE SODIUM SUCCINATE 60 MG: 40 INJECTION, POWDER, FOR SOLUTION INTRAMUSCULAR; INTRAVENOUS at 14:07

## 2021-07-01 RX ADMIN — IPRATROPIUM BROMIDE AND ALBUTEROL SULFATE 3 ML: .5; 3 SOLUTION RESPIRATORY (INHALATION) at 07:31

## 2021-07-01 RX ADMIN — Medication 5 MG: at 00:14

## 2021-07-01 RX ADMIN — CARBAMIDE PEROXIDE 6.5% 5 DROP: 6.5 LIQUID AURICULAR (OTIC) at 08:17

## 2021-07-01 RX ADMIN — ALBUMIN (HUMAN) 25 G: 0.25 INJECTION, SOLUTION INTRAVENOUS at 00:15

## 2021-07-01 RX ADMIN — IPRATROPIUM BROMIDE AND ALBUTEROL SULFATE 3 ML: .5; 3 SOLUTION RESPIRATORY (INHALATION) at 19:17

## 2021-07-01 RX ADMIN — Medication 10 ML: at 14:07

## 2021-07-01 RX ADMIN — INSULIN LISPRO 2 UNITS: 100 INJECTION, SOLUTION INTRAVENOUS; SUBCUTANEOUS at 06:30

## 2021-07-01 RX ADMIN — METHYLPREDNISOLONE SODIUM SUCCINATE 60 MG: 40 INJECTION, POWDER, FOR SOLUTION INTRAMUSCULAR; INTRAVENOUS at 22:13

## 2021-07-01 RX ADMIN — FLUTICASONE PROPIONATE 2 SPRAY: 50 SPRAY, METERED NASAL at 08:17

## 2021-07-01 RX ADMIN — METHYLPREDNISOLONE SODIUM SUCCINATE 125 MG: 40 INJECTION, POWDER, FOR SOLUTION INTRAMUSCULAR; INTRAVENOUS at 06:29

## 2021-07-01 RX ADMIN — CARBAMIDE PEROXIDE 6.5% 5 DROP: 6.5 LIQUID AURICULAR (OTIC) at 21:00

## 2021-07-01 RX ADMIN — INSULIN LISPRO 2 UNITS: 100 INJECTION, SOLUTION INTRAVENOUS; SUBCUTANEOUS at 12:11

## 2021-07-01 RX ADMIN — GUAIFENESIN 600 MG: 600 TABLET, EXTENDED RELEASE ORAL at 12:11

## 2021-07-01 RX ADMIN — GUAIFENESIN 600 MG: 600 TABLET, EXTENDED RELEASE ORAL at 22:14

## 2021-07-01 RX ADMIN — Medication 10 ML: at 22:14

## 2021-07-01 RX ADMIN — Medication 10 ML: at 06:30

## 2021-07-01 RX ADMIN — WATER 1 G: 1 INJECTION INTRAMUSCULAR; INTRAVENOUS; SUBCUTANEOUS at 08:16

## 2021-07-01 RX ADMIN — AZITHROMYCIN MONOHYDRATE 500 MG: 500 INJECTION, POWDER, LYOPHILIZED, FOR SOLUTION INTRAVENOUS at 11:38

## 2021-07-01 RX ADMIN — WATER 1 G: 1 INJECTION INTRAMUSCULAR; INTRAVENOUS; SUBCUTANEOUS at 15:48

## 2021-07-01 NOTE — PROGRESS NOTES
Problem: Mobility Impaired (Adult and Pediatric)  Goal: *Acute Goals and Plan of Care (Insert Text)  Description: Physical Therapy Goals   Initiated 6/28/2021 and to be accomplished within 5-7 day(s)  1. Patient will move from supine <> sit with mod I in prep for out of bed activity and change of position. 2.  Patient will perform sit<> stand with mod I with LRAD in prep for transfers/ambulation. 3.  Patient will transfer from bed <> chair with mod I with LRAD for time up in chair for completion of ADL activity. 4.  Patient will ambulate 150 feet with S/LRAD for improved functional mobility at discharge. 5.  Patient will ascend/descend 3-5 stairs with handrail(s) with minimal assistance/contact guard assist for home re-entry as needed. Outcome: Progressing Towards Goal   PHYSICAL THERAPY TREATMENT    Patient: Kelin Gan (68 y.o. male)  Date: 7/1/2021  Diagnosis: Dyspnea [R06.00]  COPD (chronic obstructive pulmonary disease) ([facility] [address] [J44.9]  Sepsis ([facility] [address] [A41.9]  UTI (urinary tract infection) [N39.0] Sepsis ([facility] [address]    Precautions: Fall  PLOF: lives in a RW, ambulatory with a cane, has a rollator    ASSESSMENT:  Pt wheezy, SpO2 97% on supplemental O2. No assistance needed for bed mobility or sit to stand. Ambulated with personal cane, on RA, reciprocal gt pattern, steady pace, no LOB or path deviations. SOB and very wheezy upon returning to room, unable to get an O2 reading, donned nasal cannula. Pt left supine in bed. Progression toward goals:   [x]      Improving appropriately and progressing toward goals  []      Improving slowly and progressing toward goals  []      Not making progress toward goals and plan of care will be adjusted     PLAN:  Patient continues to benefit from skilled intervention to address the above impairments. Continue treatment per established plan of care.   Discharge Recommendations:  Home Health  Further Equipment Recommendations for Discharge:  N/A     SUBJECTIVE: Patient stated I need to walk so I can have a BM.     OBJECTIVE DATA SUMMARY:   Critical Behavior:  Neurologic State: Alert  Orientation Level: Oriented X4  Cognition: Follows commands  Safety/Judgement: Fall prevention  Functional Mobility Training:  Bed Mobility:    Supine to Sit: Independent  Sit to Supine: Independent  Transfers:  Sit to Stand: Supervision  Stand to Sit: Supervision  Balance:  Sitting: Intact  Standing: Intact; With support   Ambulation/Gait Training:  Distance (ft): 260 Feet (ft)  Assistive Device: Gait belt;Cane, straight  Ambulation - Level of Assistance: Modified independent        Pain:  Pain level pre-treatment: 0/10  Pain level post-treatment: 0/10   Pain Intervention(s): Medication (see MAR); Rest, Ice, Repositioning   Response to intervention: Nurse notified, See doc flow    Activity Tolerance:   Fair+  Please refer to the flowsheet for vital signs taken during this treatment. After treatment:   [] Patient left in no apparent distress sitting up in chair  [x] Patient left in no apparent distress in bed  [x] Call bell left within reach  [] Nursing notified  [] Caregiver present  [] Bed alarm activated  [] SCDs applied      COMMUNICATION/EDUCATION:   [x]         Role of Physical Therapy in the acute care setting. [x]         Fall prevention education was provided and the patient/caregiver indicated understanding. [x]         Patient/family have participated as able in working toward goals and plan of care. [x]         Patient/family agree to work toward stated goals and plan of care. []         Patient understands intent and goals of therapy, but is neutral about his/her participation.   []         Patient is unable to participate in stated goals/plan of care: ongoing with therapy staff.  []         Other:        Ailyn Murray PTA   Time Calculation: 16 mins

## 2021-07-01 NOTE — PROGRESS NOTES
Problem: Falls - Risk of  Goal: *Absence of Falls  Description: Document Onward Delevan Fall Risk and appropriate interventions in the flowsheet.   Outcome: Progressing Towards Goal  Note: Fall Risk Interventions:  Mobility Interventions: Communicate number of staff needed for ambulation/transfer, Bed/chair exit alarm, Patient to call before getting OOB, Utilize walker, cane, or other assistive device         Medication Interventions: Bed/chair exit alarm, Patient to call before getting OOB, Teach patient to arise slowly

## 2021-07-01 NOTE — PROGRESS NOTES
Hospitalist Progress Note    Patient: Trena Benitez MRN: [id number]  CSN: [id number]    YOB: 1945  Age: 78 y.o. Sex: male    DOA: 6/27/2021 LOS:  LOS: 4 days          Chief Complaint:    retention      Assessment/Plan        81-year-old male past medical history of hypertension, COPD, Parkinson's and CVA who is being admitted for sepsis secondary to urinary source, urinary tract infection, COPD exacerbation and urinary retention.      Sepsis-ruled out, I think lactic acidosis is due to acute retention, stress, and COPD exacerbation  Leukocytosis improving your white blood count dropped from 17.2-14.5  Urine and blood cultures are negative    Can stop IVF hydration as eating and drinking well, renal fxn wnl    Continue IV abx for acute bronchitis     Urinary tract infection-pyuria  Continue treatment, cx neg, continue diaz as per urology     Urinary retention -  As above     COPD exacerbation -  Continue IV Solu-Medrol  Order Pulmicort every 12 hours   duo nebs as needed  Chest x-ray unremarkable    Add mucinex     Code Status: DNR/DNI     D/c planning 24-48 hrs  Disposition :  Patient Active Problem List   Diagnosis Code    Syncope R55    Hypertension I10    Chronic obstructive pulmonary disease ([facility] J44.9    Parkinson's disease ([facility] G20    COPD exacerbation ([facility] J44.1    UTI (urinary tract infection) N39.0    Dyspnea R06.00    Sepsis ([facility] A41.9    Urinary retention R33.9    Weakness generalized R53.1       Subjective:  I feel better since I got my bladder sorted out!!     Denies pain, inc SOB  Is coughing but lungs feel better  No N/V/D      Review of systems:    Constitutional: denies fevers, chills, myalgias  Respiratory: denies SOB  Cardiovascular: denies chest pain, palpitations        Vital signs/Intake and Output:  Visit Vitals  /69   Pulse (!) 101   Temp 98 °F (36.7 °C)   Resp 16   Ht 5' 7\" (1.702 m)   Wt 77.1 kg (170 lb)   SpO2 96%   BMI 26.63 kg/m²     Current Shift:  07/01 0701 - 07/01 1900  In: -   Out: 2250 [Urine:2250]  Last three shifts:  06/29 1901 - 07/01 0700  In: 3011 [I.V.:3011]  Out: 5800 [Urine:5800]    Exam:    General: Well developed, alert, NAD, OX3  CVS:Regular rate and rhythm, no M/R/G, S1/S2 heard, no thrill  Lungs:Coarse rhonchi scattered BL  Abdomen: Soft, Nontender, No distention, Normal Bowel sounds, No hepatomegaly  Extremities: No C/C/E, pulses palpable 2+  Clear urine in diaz bag  Neuro:grossly normal , follows commands  Psych:appropriate, very talkative and animated in discussion                Labs: Results:       Chemistry Recent Labs     06/30/21  1820 06/30/21  0200 06/29/21  0055   * 131* 148*    142 140   K 3.6 3.5 3.3*    107 104   CO2 29 28 27   BUN 25* 26* 23*   CREA 1.04 0.92 1.01   CA 7.2* 7.0* 7.1*   AGAP 6 7 9   BUCR 24* 28* 23*      CBC w/Diff Recent Labs     06/30/21  0200 06/29/21  0055   WBC 14.5* 17.2*   RBC 3.72* 4.13*   HGB 11.3* 12.8*   HCT 34.9* 38.2    204      Cardiac Enzymes Recent Labs     06/30/21  1820      CKND1 3.0      Coagulation No results for input(s): PTP, INR, APTT, INREXT in the last 72 hours. Lipid Panel No results found for: CHOL, CHOLPOCT, CHOLX, CHLST, CHOLV, 239640, HDL, HDLP, LDL, LDLC, DLDLP, 418238, VLDLC, VLDL, TGLX, TRIGL, TRIGP, TGLPOCT, CHHD, CHHDX   BNP No results for input(s): BNPP in the last 72 hours. Liver Enzymes No results for input(s): TP, ALB, TBIL, AP in the last 72 hours.     No lab exists for component: SGOT, GPT, DBIL   Thyroid Studies Lab Results   Component Value Date/Time    TSH 0.92 10/30/2017 02:16 AM        Procedures/imaging: see electronic medical records for all procedures/Xrays and details which were not copied into this note but were reviewed prior to creation of Citlali Rowe MD

## 2021-07-01 NOTE — CONSULTS
Urology Consult    Subjective:     Date of Consultation:  June 30, 2021    Referring Physician: Dr Kaycee Samayoa    Reason for Consultation:  Urinary retention    History of Present Illness:   Patient is a 78 y.o.  male who is being seen for urinary retention. He was admitted to the hospital for Dyspnea [R06.00]  COPD (chronic obstructive pulmonary disease) ([facility] [J44.9]  Sepsis ([facility] [A41.9]  UTI (urinary tract infection) [N39.0]. He is a very robust and talkative indvidual who denies any prior urologic history or voiding difficulty. Approx 1.5 weeks ago, he went to [facility] with complaints of just feeling weak and rundown and then developed chest tightness. He was unable to void while there and he had a diaz placed. Apparently there was some difficulty and it took the nurse 3 attempts to place it. He apparently had a lot of gross hematuria and was sent home. At home, he floundered with increasing weakness and poor appetite and inability to perform ADA's. At baseline, he denies any urinary troubles. No prior UTI. No dysuria. Flow is usually moderate. Past Medical History:   Diagnosis Date    Chronic obstructive pulmonary disease ([facility]     Hypertension     Parkinson's disease ([facility]     Stroke [facility])       Past Surgical History:   Procedure Laterality Date    HX ORTHOPAEDIC      left leg surgery      History reviewed. No pertinent family history. Social History     Tobacco Use    Smoking status: Former Smoker    Smokeless tobacco: Never Used   Substance Use Topics    Alcohol use: Not on file     No Known Allergies   Prior to Admission medications    Medication Sig Start Date End Date Taking? Authorizing Provider   cephALEXin (KEFLEX) 500 mg capsule Take 500 mg by mouth three (3) times daily.  6/23/21 7/3/21 Yes Other, MD Malinda   albuterol (PROVENTIL HFA, VENTOLIN HFA, PROAIR HFA) 90 mcg/actuation inhaler Take 2 Puffs by inhalation every four (4) hours as needed. 20  Yes Emerald, MD Malinda   tiotropium (SPIRIVA WITH HANDIHALER) 18 mcg inhalation capsule Take 1 capsule by inhalation daily. Yes Emerald, MD Malinda   albuterol-ipratropium (DUO-NEB) 2.5 mg-0.5 mg/3 ml nebu 3 mL by Nebulization route every four (4) hours as needed. 10/31/17   Latrice Mayer MD   albuterol (PROVENTIL HFA, VENTOLIN HFA, PROAIR HFA) 90 mcg/actuation inhaler Take  by inhalation. Emerald, MD Malinda         Review of Systems:    Gen: POSITIVE fevers and  Chills and malaise; NO weight loss/gain. Heent: No headache, rhinorrhea, epistaxis, ear pain, hearing loss, sinus pain, neck pain/stiffness, sore throat. Heart: No current chest pain, palpitations, MCMILLAN, pnd, or orthopnea. Resp: POSITIVE chronic SOB at times   GI: No nausea, vomiting, diarrhea, constipation, melena or hematochezia. : Per HPI   Derm: No rash, new skin lesion or pruritis. Musc/skeletal: chronic hip pains at times and leg weakness  Vasc: No edema, cyanosis or claudication. Endo: No heat/cold intolerance, no polyuria,polydipsia or polyphagia. Neuro: No unilateral weakness, numbness, tingling. No seizures.    Heme: No easy bruising or bleeding.       Objective:     Patient Vitals for the past 8 hrs:   BP Temp Pulse Resp SpO2   21 2044 139/70 97.4 °F (36.3 °C) (!) 106 18 98 %   21 1820 (!) 156/60 -- (!) 107 18 98 %   21 1806 (!) 146/82 -- 62 20 96 %   21 1804 -- -- 92 20 98 %   21 1759 -- -- 90 19 97 %   21 1758 (!) 141/59 -- (!) 114 18 98 %   21 1604 (!) 149/78 97.7 °F (36.5 °C) 100 20 98 %     Temp (24hrs), Av.5 °F (36.4 °C), Min:97.3 °F (36.3 °C), Max:97.7 °F (36.5 °C)      Intake and Output:    0701 -  1900  In: 0171 [P.O.:960; I.V.:3556]  Out: 3800 [Urine:3800]    Physical Exam:  Gen - NAD, very talkative and animated  HEENT - dentition intact, moist mucous membranes, EOM intact, midline trachea, supple neck  CV - RRR  Resp - Breathing easy  Abd - soft, nd, nt   - normal phallus and meatus and scrotum with normal testes bilat without masses or tendenress         - diaz in place draining clear urine  Ext - no edema    BMP:   Lab Results   Component Value Date/Time     06/30/2021 06:20 PM    K 3.6 06/30/2021 06:20 PM     06/30/2021 06:20 PM    CO2 29 06/30/2021 06:20 PM    AGAP 6 06/30/2021 06:20 PM     (H) 06/30/2021 06:20 PM    BUN 25 (H) 06/30/2021 06:20 PM    CREA 1.04 06/30/2021 06:20 PM    GFRAA >60 06/30/2021 06:20 PM    GFRNA >60 06/30/2021 06:20 PM      WBC = 14.5    CT (6/28/2021) - negative for hydro or abnormality (personally reviewed    UCX (6/27) = negative      Assessment:     Principal Problem:    Sepsis ([facility] [facility] (6/27/2021)    Active Problems:    COPD exacerbation ([address] (6/27/2021)      UTI (urinary tract infection) (6/27/2021)      Dyspnea (6/27/2021)      Urinary retention (6/27/2021)      Weakness generalized (6/27/2021)        He has retention either secondary UTI or he has a UTI secondary to retention and catheter placement.       Plan:       Start flomax tonight  Likely catheter removal on Friday morning    Dandre Bello MD

## 2021-07-01 NOTE — ROUTINE PROCESS
6548 Patient received AOX4 in no distress breathing on 2L oxygen via nasal canula. Chest expansion equal and adequate. Safety measures in place. Full assessment in epic. Bedside and Verbal shift change report given to ananda (oncoming nurse) by Ivan Marshall (offgoing nurse). Report included the following information SBAR and Kardex.

## 2021-07-01 NOTE — PROGRESS NOTES
Problem: Chronic Obstructive Pulmonary Disease (COPD)  Goal: *Oxygen saturation during activity within specified parameters  Outcome: Progressing Towards Goal  Goal: *Able to remain out of bed as prescribed  Outcome: Progressing Towards Goal  Goal: *Absence of hypoxia  Outcome: Progressing Towards Goal  Goal: *Optimize nutritional status  Outcome: Progressing Towards Goal     Problem: Patient Education: Go to Patient Education Activity  Goal: Patient/Family Education  Outcome: Progressing Towards Goal     Problem: Falls - Risk of  Goal: *Absence of Falls  Description: Document Christiano Fall Risk and appropriate interventions in the flowsheet.   Outcome: Progressing Towards Goal  Note: Fall Risk Interventions:  Mobility Interventions: Communicate number of staff needed for ambulation/transfer, Bed/chair exit alarm, Patient to call before getting OOB, Utilize walker, cane, or other assistive device         Medication Interventions: Bed/chair exit alarm, Patient to call before getting OOB, Teach patient to arise slowly                   Problem: Patient Education: Go to Patient Education Activity  Goal: Patient/Family Education  Outcome: Progressing Towards Goal     Problem: Patient Education: Go to Patient Education Activity  Goal: Patient/Family Education  Outcome: Progressing Towards Goal     Problem: Patient Education: Go to Patient Education Activity  Goal: Patient/Family Education  Outcome: Progressing Towards Goal

## 2021-07-01 NOTE — ROUTINE PROCESS
Assume care of patient from [facility]. Patient received in bed awake. Patient A&Ox4, denies pain and discomfort. No distress noted. Bed locked in low position. Call bell within reach and patient verbalized understanding of use for assistance and needs. 0715- Bedside and Verbal shift change report given to Ellena Rubinstein, RN (oncoming nurse) by Sofia aLboy RN (offgoing nurse). Report included the following information SBAR, Kardex, Intake/Output, MAR and Recent Results. 3 [facility] Night Shift Chart Audit    Chart Audit completed?  YES

## 2021-07-02 LAB
ALBUMIN SERPL-MCNC: 2.9 G/DL (ref 3.4–5)
ALBUMIN/GLOB SERPL: 1.3 {RATIO} (ref 0.8–1.7)
ALP SERPL-CCNC: 36 U/L (ref 45–117)
ALT SERPL-CCNC: 29 U/L (ref 16–61)
ANION GAP SERPL CALC-SCNC: 5 MMOL/L (ref 3–18)
AST SERPL-CCNC: 19 U/L (ref 10–38)
BASOPHILS # BLD: 0 K/UL (ref 0–0.1)
BASOPHILS NFR BLD: 0 % (ref 0–2)
BILIRUB SERPL-MCNC: 0.6 MG/DL (ref 0.2–1)
BUN SERPL-MCNC: 27 MG/DL (ref 7–18)
BUN/CREAT SERPL: 27 (ref 12–20)
CALCIUM SERPL-MCNC: 7.1 MG/DL (ref 8.5–10.1)
CHLORIDE SERPL-SCNC: 101 MMOL/L (ref 100–111)
CO2 SERPL-SCNC: 33 MMOL/L (ref 21–32)
CREAT SERPL-MCNC: 1 MG/DL (ref 0.6–1.3)
DIFFERENTIAL METHOD BLD: ABNORMAL
EOSINOPHIL # BLD: 0 K/UL (ref 0–0.4)
EOSINOPHIL NFR BLD: 0 % (ref 0–5)
ERYTHROCYTE [DISTWIDTH] IN BLOOD BY AUTOMATED COUNT: 13.8 % (ref 11.6–14.5)
GLOBULIN SER CALC-MCNC: 2.3 G/DL (ref 2–4)
GLUCOSE BLD STRIP.AUTO-MCNC: 101 MG/DL (ref 70–110)
GLUCOSE BLD STRIP.AUTO-MCNC: 126 MG/DL (ref 70–110)
GLUCOSE BLD STRIP.AUTO-MCNC: 129 MG/DL (ref 70–110)
GLUCOSE BLD STRIP.AUTO-MCNC: 157 MG/DL (ref 70–110)
GLUCOSE SERPL-MCNC: 123 MG/DL (ref 74–99)
HCT VFR BLD AUTO: 33.7 % (ref 36–48)
HGB BLD-MCNC: 11.4 G/DL (ref 13–16)
LYMPHOCYTES # BLD: 1.2 K/UL (ref 0.9–3.6)
LYMPHOCYTES NFR BLD: 9 % (ref 21–52)
MCH RBC QN AUTO: 31.3 PG (ref 24–34)
MCHC RBC AUTO-ENTMCNC: 33.8 G/DL (ref 31–37)
MCV RBC AUTO: 92.6 FL (ref 74–97)
MONOCYTES # BLD: 0.7 K/UL (ref 0.05–1.2)
MONOCYTES NFR BLD: 5 % (ref 3–10)
NEUTS SEG # BLD: 11.7 K/UL (ref 1.8–8)
NEUTS SEG NFR BLD: 86 % (ref 40–73)
PLATELET # BLD AUTO: 200 K/UL (ref 135–420)
PMV BLD AUTO: 12.4 FL (ref 9.2–11.8)
POTASSIUM SERPL-SCNC: 3.6 MMOL/L (ref 3.5–5.5)
PROT SERPL-MCNC: 5.2 G/DL (ref 6.4–8.2)
RBC # BLD AUTO: 3.64 M/UL (ref 4.35–5.65)
RBC MORPH BLD: ABNORMAL
SODIUM SERPL-SCNC: 139 MMOL/L (ref 136–145)
WBC # BLD AUTO: 13.6 K/UL (ref 4.6–13.2)

## 2021-07-02 PROCEDURE — 80053 COMPREHEN METABOLIC PANEL: CPT

## 2021-07-02 PROCEDURE — 65660000000 HC RM CCU STEPDOWN

## 2021-07-02 PROCEDURE — 74011636637 HC RX REV CODE- 636/637: Performed by: FAMILY MEDICINE

## 2021-07-02 PROCEDURE — 74011250636 HC RX REV CODE- 250/636: Performed by: HOSPITALIST

## 2021-07-02 PROCEDURE — 36415 COLL VENOUS BLD VENIPUNCTURE: CPT

## 2021-07-02 PROCEDURE — 74011250636 HC RX REV CODE- 250/636: Performed by: EMERGENCY MEDICINE

## 2021-07-02 PROCEDURE — 74011250636 HC RX REV CODE- 250/636: Performed by: FAMILY MEDICINE

## 2021-07-02 PROCEDURE — 74011250637 HC RX REV CODE- 250/637: Performed by: FAMILY MEDICINE

## 2021-07-02 PROCEDURE — 74011250637 HC RX REV CODE- 250/637: Performed by: UROLOGY

## 2021-07-02 PROCEDURE — 94640 AIRWAY INHALATION TREATMENT: CPT

## 2021-07-02 PROCEDURE — 97116 GAIT TRAINING THERAPY: CPT

## 2021-07-02 PROCEDURE — 85025 COMPLETE CBC W/AUTO DIFF WBC: CPT

## 2021-07-02 PROCEDURE — 74011000250 HC RX REV CODE- 250: Performed by: FAMILY MEDICINE

## 2021-07-02 PROCEDURE — 82962 GLUCOSE BLOOD TEST: CPT

## 2021-07-02 PROCEDURE — 74011250637 HC RX REV CODE- 250/637: Performed by: HOSPITALIST

## 2021-07-02 RX ORDER — POLYETHYLENE GLYCOL 3350 17 G/17G
17 POWDER, FOR SOLUTION ORAL DAILY
Status: DISCONTINUED | OUTPATIENT
Start: 2021-07-02 | End: 2021-07-07 | Stop reason: HOSPADM

## 2021-07-02 RX ORDER — AMLODIPINE BESYLATE 5 MG/1
10 TABLET ORAL DAILY
Status: DISCONTINUED | OUTPATIENT
Start: 2021-07-02 | End: 2021-07-07 | Stop reason: HOSPADM

## 2021-07-02 RX ORDER — HYDRALAZINE HYDROCHLORIDE 20 MG/ML
20 INJECTION INTRAMUSCULAR; INTRAVENOUS
Status: DISCONTINUED | OUTPATIENT
Start: 2021-07-02 | End: 2021-07-07 | Stop reason: HOSPADM

## 2021-07-02 RX ADMIN — WATER 1 G: 1 INJECTION INTRAMUSCULAR; INTRAVENOUS; SUBCUTANEOUS at 09:07

## 2021-07-02 RX ADMIN — METHYLPREDNISOLONE SODIUM SUCCINATE 30 MG: 40 INJECTION, POWDER, FOR SOLUTION INTRAMUSCULAR; INTRAVENOUS at 13:34

## 2021-07-02 RX ADMIN — WATER 1 G: 1 INJECTION INTRAMUSCULAR; INTRAVENOUS; SUBCUTANEOUS at 17:29

## 2021-07-02 RX ADMIN — FLUTICASONE PROPIONATE 2 SPRAY: 50 SPRAY, METERED NASAL at 09:06

## 2021-07-02 RX ADMIN — BUDESONIDE 500 MCG: 0.5 INHALANT RESPIRATORY (INHALATION) at 07:37

## 2021-07-02 RX ADMIN — CARBAMIDE PEROXIDE 6.5% 5 DROP: 6.5 LIQUID AURICULAR (OTIC) at 09:06

## 2021-07-02 RX ADMIN — BENZONATATE 100 MG: 100 CAPSULE ORAL at 13:34

## 2021-07-02 RX ADMIN — AMLODIPINE BESYLATE 10 MG: 5 TABLET ORAL at 17:30

## 2021-07-02 RX ADMIN — IPRATROPIUM BROMIDE AND ALBUTEROL SULFATE 3 ML: .5; 3 SOLUTION RESPIRATORY (INHALATION) at 19:27

## 2021-07-02 RX ADMIN — TAMSULOSIN HYDROCHLORIDE 0.4 MG: 0.4 CAPSULE ORAL at 09:07

## 2021-07-02 RX ADMIN — GUAIFENESIN 600 MG: 600 TABLET, EXTENDED RELEASE ORAL at 21:49

## 2021-07-02 RX ADMIN — AZITHROMYCIN MONOHYDRATE 500 MG: 500 INJECTION, POWDER, LYOPHILIZED, FOR SOLUTION INTRAVENOUS at 13:35

## 2021-07-02 RX ADMIN — METHYLPREDNISOLONE SODIUM SUCCINATE 60 MG: 40 INJECTION, POWDER, FOR SOLUTION INTRAMUSCULAR; INTRAVENOUS at 05:54

## 2021-07-02 RX ADMIN — BUDESONIDE 500 MCG: 0.5 INHALANT RESPIRATORY (INHALATION) at 19:20

## 2021-07-02 RX ADMIN — Medication 10 ML: at 21:54

## 2021-07-02 RX ADMIN — INSULIN LISPRO 2 UNITS: 100 INJECTION, SOLUTION INTRAVENOUS; SUBCUTANEOUS at 13:52

## 2021-07-02 RX ADMIN — IPRATROPIUM BROMIDE AND ALBUTEROL SULFATE 3 ML: .5; 3 SOLUTION RESPIRATORY (INHALATION) at 07:42

## 2021-07-02 RX ADMIN — GUAIFENESIN 600 MG: 600 TABLET, EXTENDED RELEASE ORAL at 09:07

## 2021-07-02 RX ADMIN — METHYLPREDNISOLONE SODIUM SUCCINATE 30 MG: 40 INJECTION, POWDER, FOR SOLUTION INTRAMUSCULAR; INTRAVENOUS at 21:49

## 2021-07-02 RX ADMIN — Medication 5 MG: at 01:45

## 2021-07-02 RX ADMIN — ENOXAPARIN SODIUM 40 MG: 40 INJECTION SUBCUTANEOUS at 09:07

## 2021-07-02 RX ADMIN — Medication 10 ML: at 05:54

## 2021-07-02 RX ADMIN — WATER 1 G: 1 INJECTION INTRAMUSCULAR; INTRAVENOUS; SUBCUTANEOUS at 00:00

## 2021-07-02 RX ADMIN — CARBAMIDE PEROXIDE 6.5% 5 DROP: 6.5 LIQUID AURICULAR (OTIC) at 21:49

## 2021-07-02 NOTE — PROGRESS NOTES
Discharge Planning: Home with family assist, MD follow-up and possible outpatient therapy    CM spoke with the patient at the bedside regarding plans for discharge. The patient states that a family member will be available to pick him up upon discharge this weekend. Due to the fact that the patient resides in his RV and is unsure about where he will be staying after discharge home health is not an option. If the patient is agreeable to outpatient physical therapy upon discharge then orders will be provided. The patient denies any additional questions or concerns at this time. CM to follow the patient's progress and be available to assist with discharge planning as needed. CMS referral placed. Care Management Interventions  PCP Verified by CM: Yes  Palliative Care Criteria Met (RRAT>21 & CHF Dx)?: No  Mode of Transport at Discharge:  Other (see comment) (family)  Physical Therapy Consult: Yes  Occupational Therapy Consult: Yes  Current Support Network: Lives Alone  Confirm Follow Up Transport: Friends  The Patient and/or Patient Representative was Provided with a Choice of Provider and Agrees with the Discharge Plan?: Yes  Name of the Patient Representative Who was Provided with a Choice of Provider and Agrees with the Discharge Plan: [facility]  [place] of Choice List was Provided with Basic Dialogue that Supports the Patient's Individualized Plan of Care/Goals, Treatment Preferences and Shares the Quality Data Associated with the Providers?: Yes  Discharge Location  Discharge Placement: Home with outpatient services

## 2021-07-02 NOTE — PROGRESS NOTES
1900: Bedside and Verbal shift change report given to Chad Peña RN (oncoming nurse) by Remi Zimmer RN (offgoing nurse). Report included the following information SBAR, Kardex, ED Summary, Intake/Output, MAR, Recent Results, Med Rec Status, Cardiac Rhythm Sinus Rhythm and Alarm Parameters . 1936: Spoke with Dr. Christine Owens concerning Potassium - 3.3 and Lactic Acid - 5.2. Orders received to replace Potassium and recheck Lactic Acid. 2000: Shift assessment completed. Pt A&Ox4, tolerating 2LNC at this time. 2038: Received call from [facility] in lab concerning critical Lactic Acid result of 3.9.     2050: Paged Dr. Christine Owens concerning recent Lactic Acid result - 3.9.    2106: Dr. Christine Owens returned paged and orders received for NS 50ml/H. Advised no lactic acid lab recheck necessary. 0000: Reassessment completed. Pt resting quietly at this time. 0130: Pt requesting sleep aid due to insomnia. SEE MAR.    0400: Reassessment completed. Pt resting comfortably at this time. 0715: Bedside and Verbal shift change report given to Marguerite Shannon RN (oncoming nurse) by Chad Peña RN (offgoing nurse). Report included the following information SBAR, Kardex, Intake/Output, MAR, Recent Results, Med Rec Status, Cardiac Rhythm NSR and Alarm Parameters . Attempted to remove diaz catheter, pt requested it to be done after nebulization treatment.

## 2021-07-02 NOTE — PROGRESS NOTES
Hospitalist Progress Note    Patient: Ricky Sharp MRN: [id number]  CSN: [id number]    YOB: 1945  Age: 78 y.o.   Sex: male    DOA: 6/27/2021 LOS:  LOS: 5 days          Chief Complaint:    weakness      Assessment/Plan        68-year-old male past medical history of hypertension, COPD, Parkinson's and CVA who is being admitted for sepsis secondary to urinary source, urinary tract infection, COPD exacerbation and urinary retention.      Sepsis-ruled out, I think lactic acidosis is due to acute retention, physical stress, and COPD exacerbation w/ bronchitis  Leukocytosis improving   Urine and blood cultures are negative     Can stop IVF hydration      Continue IV abx for acute bronchitis     Urinary tract infection-pyuria  Continue treatment, cx neg, continue diaz as per urology     Urinary retention -  As above  Diaz removed this am     COPD exacerbation -  Continue IV Solu-Medrol, wean dose-to PO shortly  Pulmicort every 12 hours   duo nebs as needed  Chest x-ray unremarkable  mucinex     Code Status: DNR/DNI     D/c tomorrow  Disposition :  Patient Active Problem List   Diagnosis Code    Syncope R55    Hypertension I10    Chronic obstructive pulmonary disease ([facility] J44.9    Parkinson's disease ([facility] G20    COPD exacerbation ([facility] J44.1    UTI (urinary tract infection) N39.0    Dyspnea R06.00    Sepsis ([address] A41.9    Urinary retention R33.9    Weakness generalized R53.1       Subjective:  I do feel better  Just waiting to pee since catheter out    Denies inc SOB  Still has cough, congestion, although better        Review of systems:    Constitutional: denies fevers, chills  Cardiovascular: denies chest pain, palpitations  Gastrointestinal: denies nausea, vomiting, diarrhea      Vital signs/Intake and Output:  Visit Vitals  /62   Pulse 78   Temp 97.8 °F (36.6 °C)   Resp 20   Ht 5' 7\" (1.702 m)   Wt 77.1 kg (170 lb)   SpO2 99%   BMI 26.63 kg/m²     Current Shift:  No intake/output data recorded. Last three shifts:  06/30 1901 - 07/02 0700  In: 650 [P.O.:550; I.V.:100]  Out: 6700 [Urine:6700]    Exam:    General: Well developed, alert, NAD, OX3  CVS:Regular rate and rhythm, no M/R/G, S1/S2 heard, no thrill  Lungs:less wheezing, scattered rhonchi, improved aeration BL today  Abdomen: Soft, Nontender, No distention, Normal Bowel sounds, No hepatomegaly  Extremities: No C/C/E, pulses palpable 2+  Neuro:grossly normal , follows commands  Psych:appropriate                Labs: Results:       Chemistry Recent Labs     07/02/21  0205 07/01/21  1950 07/01/21  0650   * 126* 139*    140 140   K 3.6 3.5 3.3*    102 104   CO2 33* 32 29   BUN 27* 26* 27*   CREA 1.00 1.25 1.10   CA 7.1* 7.5* 7.0*   AGAP 5 6 7   BUCR 27* 21* 25*   AP 36*  --  37*   TP 5.2*  --  5.0*   ALB 2.9*  --  2.8*   GLOB 2.3  --  2.2   AGRAT 1.3  --  1.3      CBC w/Diff Recent Labs     07/02/21  0205 07/01/21  0650 06/30/21  0200   WBC 13.6* 12.6 14.5*   RBC 3.64* 3.49* 3.72*   HGB 11.4* 10.9* 11.3*   HCT 33.7* 32.7* 34.9*    183 188   GRANS 86* 85*  --    LYMPH 9* 7*  --    EOS 0 0  --       Cardiac Enzymes Recent Labs     06/30/21  1820      CKND1 3.0      Coagulation No results for input(s): PTP, INR, APTT, INREXT in the last 72 hours. Lipid Panel No results found for: CHOL, CHOLPOCT, CHOLX, CHLST, CHOLV, 010850, HDL, HDLP, LDL, LDLC, DLDLP, 861194, VLDLC, VLDL, TGLX, TRIGL, TRIGP, TGLPOCT, CHHD, CHHDX   BNP No results for input(s): BNPP in the last 72 hours.    Liver Enzymes Recent Labs     07/02/21  0205   TP 5.2*   ALB 2.9*   AP 36*      Thyroid Studies Lab Results   Component Value Date/Time    TSH 0.92 10/30/2017 02:16 AM        Procedures/imaging: see electronic medical records for all procedures/Xrays and details which were not copied into this note but were reviewed prior to creation of Millie Morales MD

## 2021-07-02 NOTE — PROGRESS NOTES
Problem: Mobility Impaired (Adult and Pediatric)  Goal: *Acute Goals and Plan of Care (Insert Text)  Description: Physical Therapy Goals   Initiated 6/28/2021 and to be accomplished within 5-7 day(s)  1. Patient will move from supine <> sit with mod I in prep for out of bed activity and change of position. 2.  Patient will perform sit<> stand with mod I with LRAD in prep for transfers/ambulation. 3.  Patient will transfer from bed <> chair with mod I with LRAD for time up in chair for completion of ADL activity. 4.  Patient will ambulate 150 feet with S/LRAD for improved functional mobility at discharge. 5.  Patient will ascend/descend 3-5 stairs with handrail(s) with minimal assistance/contact guard assist for home re-entry as needed. Outcome: Progressing Towards Goal   PHYSICAL THERAPY TREATMENT    Patient: Haily Núñez (85 y.o. male)  Date: 7/2/2021  Diagnosis: Dyspnea [R06.00]  COPD (chronic obstructive pulmonary disease) ([facility]) [J44.9]  Sepsis ([facility] [A41.9]  UTI (urinary tract infection) [N39.0] Sepsis ([facility]    Precautions: Fall  PLOF: lives in a RV, ambulatory with a cane, has a rollator    ASSESSMENT:  Pt sitting in chair upon arrival.  Independent for sit to stand. Ambulated with personal cane for 300ft, reciprocal gt pattern, steady pace, wheezing throughout session. Returned to room and left sitting in chair. Progression toward goals:   [x]      Improving appropriately and progressing toward goals  []      Improving slowly and progressing toward goals  []      Not making progress toward goals and plan of care will be adjusted     PLAN:  Patient continues to benefit from skilled intervention to address the above impairments. Continue treatment per established plan of care. Discharge Recommendations:  None  Further Equipment Recommendations for Discharge:  N/A     SUBJECTIVE:   Patient stated My legs are feeling a little better today, they are not burning.     OBJECTIVE DATA SUMMARY:   Critical Behavior:  Neurologic State: Alert  Orientation Level: Oriented X4  Cognition: Appropriate decision making, Appropriate for age attention/concentration, Appropriate safety awareness, Follows commands  Safety/Judgement: Fall prevention  Functional Mobility Training:  Transfers:  Sit to Stand: Independent  Stand to Sit: Independent  Balance:  Sitting: Intact  Standing: Intact; With support   Ambulation/Gait Training:  Distance (ft): 300 Feet (ft)  Assistive Device: Gait belt;Cane, straight  Ambulation - Level of Assistance: Modified independent        Pain:  Pain level pre-treatment: 0/10  Pain level post-treatment: 0/10   Pain Intervention(s): Medication (see MAR); Rest, Ice, Repositioning   Response to intervention: Nurse notified, See doc flow    Activity Tolerance:   Fair+  Please refer to the flowsheet for vital signs taken during this treatment. After treatment:   [x] Patient left in no apparent distress sitting up in chair  [] Patient left in no apparent distress in bed  [x] Call bell left within reach  [] Nursing notified  [] Caregiver present  [] Bed alarm activated  [] SCDs applied      COMMUNICATION/EDUCATION:   [x]         Role of Physical Therapy in the acute care setting. [x]         Fall prevention education was provided and the patient/caregiver indicated understanding. [x]         Patient/family have participated as able in working toward goals and plan of care. [x]         Patient/family agree to work toward stated goals and plan of care. []         Patient understands intent and goals of therapy, but is neutral about his/her participation.   []         Patient is unable to participate in stated goals/plan of care: ongoing with therapy staff.  []         Other:        Lindsey Madrigal PTA   Time Calculation: 12 mins

## 2021-07-02 NOTE — PROGRESS NOTES
Verbal shift change report given to BonnieRN and JohnathanRN (oncoming nurse) by myself (offgoing nurse). Report included the following information SBAR, Accordion and Cardiac Rhythm nsr.

## 2021-07-03 LAB
ALBUMIN SERPL-MCNC: 2.7 G/DL (ref 3.4–5)
ALBUMIN/GLOB SERPL: 1.1 {RATIO} (ref 0.8–1.7)
ALP SERPL-CCNC: 38 U/L (ref 45–117)
ALT SERPL-CCNC: 40 U/L (ref 16–61)
ANION GAP SERPL CALC-SCNC: 4 MMOL/L (ref 3–18)
AST SERPL-CCNC: 25 U/L (ref 10–38)
BACTERIA SPEC CULT: NORMAL
BACTERIA SPEC CULT: NORMAL
BASOPHILS # BLD: 0 K/UL (ref 0–0.1)
BASOPHILS NFR BLD: 0 % (ref 0–2)
BILIRUB SERPL-MCNC: 0.6 MG/DL (ref 0.2–1)
BUN SERPL-MCNC: 28 MG/DL (ref 7–18)
BUN/CREAT SERPL: 26 (ref 12–20)
CALCIUM SERPL-MCNC: 7.3 MG/DL (ref 8.5–10.1)
CHLORIDE SERPL-SCNC: 102 MMOL/L (ref 100–111)
CO2 SERPL-SCNC: 32 MMOL/L (ref 21–32)
CREAT SERPL-MCNC: 1.08 MG/DL (ref 0.6–1.3)
DIFFERENTIAL METHOD BLD: ABNORMAL
EOSINOPHIL # BLD: 0 K/UL (ref 0–0.4)
EOSINOPHIL NFR BLD: 0 % (ref 0–5)
ERYTHROCYTE [DISTWIDTH] IN BLOOD BY AUTOMATED COUNT: 14 % (ref 11.6–14.5)
GLOBULIN SER CALC-MCNC: 2.4 G/DL (ref 2–4)
GLUCOSE BLD STRIP.AUTO-MCNC: 108 MG/DL (ref 70–110)
GLUCOSE BLD STRIP.AUTO-MCNC: 154 MG/DL (ref 70–110)
GLUCOSE BLD STRIP.AUTO-MCNC: 158 MG/DL (ref 70–110)
GLUCOSE BLD STRIP.AUTO-MCNC: 217 MG/DL (ref 70–110)
GLUCOSE SERPL-MCNC: 122 MG/DL (ref 74–99)
HCT VFR BLD AUTO: 35.9 % (ref 36–48)
HGB BLD-MCNC: 11.8 G/DL (ref 13–16)
LYMPHOCYTES # BLD: 1 K/UL (ref 0.9–3.6)
LYMPHOCYTES NFR BLD: 7 % (ref 21–52)
MCH RBC QN AUTO: 31 PG (ref 24–34)
MCHC RBC AUTO-ENTMCNC: 32.9 G/DL (ref 31–37)
MCV RBC AUTO: 94.2 FL (ref 74–97)
MONOCYTES # BLD: 1.1 K/UL (ref 0.05–1.2)
MONOCYTES NFR BLD: 8 % (ref 3–10)
NEUTS SEG # BLD: 11.9 K/UL (ref 1.8–8)
NEUTS SEG NFR BLD: 85 % (ref 40–73)
PLATELET # BLD AUTO: 198 K/UL (ref 135–420)
PLATELET COMMENTS,PCOM: ABNORMAL
PMV BLD AUTO: 12.2 FL (ref 9.2–11.8)
POTASSIUM SERPL-SCNC: 4 MMOL/L (ref 3.5–5.5)
PROT SERPL-MCNC: 5.1 G/DL (ref 6.4–8.2)
RBC # BLD AUTO: 3.81 M/UL (ref 4.35–5.65)
RBC MORPH BLD: ABNORMAL
SERVICE CMNT-IMP: NORMAL
SERVICE CMNT-IMP: NORMAL
SODIUM SERPL-SCNC: 138 MMOL/L (ref 136–145)
WBC # BLD AUTO: 14 K/UL (ref 4.6–13.2)
WBC MORPH BLD: ABNORMAL

## 2021-07-03 PROCEDURE — 36415 COLL VENOUS BLD VENIPUNCTURE: CPT

## 2021-07-03 PROCEDURE — 74011000250 HC RX REV CODE- 250: Performed by: FAMILY MEDICINE

## 2021-07-03 PROCEDURE — 74011250636 HC RX REV CODE- 250/636: Performed by: EMERGENCY MEDICINE

## 2021-07-03 PROCEDURE — 74011636637 HC RX REV CODE- 636/637: Performed by: FAMILY MEDICINE

## 2021-07-03 PROCEDURE — 65660000000 HC RM CCU STEPDOWN

## 2021-07-03 PROCEDURE — 97116 GAIT TRAINING THERAPY: CPT

## 2021-07-03 PROCEDURE — 80053 COMPREHEN METABOLIC PANEL: CPT

## 2021-07-03 PROCEDURE — 82962 GLUCOSE BLOOD TEST: CPT

## 2021-07-03 PROCEDURE — 74011250637 HC RX REV CODE- 250/637: Performed by: HOSPITALIST

## 2021-07-03 PROCEDURE — 74011250636 HC RX REV CODE- 250/636: Performed by: FAMILY MEDICINE

## 2021-07-03 PROCEDURE — 77010033678 HC OXYGEN DAILY

## 2021-07-03 PROCEDURE — 74011000250 HC RX REV CODE- 250: Performed by: HOSPITALIST

## 2021-07-03 PROCEDURE — 85025 COMPLETE CBC W/AUTO DIFF WBC: CPT

## 2021-07-03 PROCEDURE — 74011250637 HC RX REV CODE- 250/637: Performed by: FAMILY MEDICINE

## 2021-07-03 PROCEDURE — 74011250637 HC RX REV CODE- 250/637: Performed by: UROLOGY

## 2021-07-03 PROCEDURE — 94640 AIRWAY INHALATION TREATMENT: CPT

## 2021-07-03 PROCEDURE — 74011250636 HC RX REV CODE- 250/636: Performed by: HOSPITALIST

## 2021-07-03 RX ORDER — LORAZEPAM 0.5 MG/1
0.5 TABLET ORAL
Status: DISCONTINUED | OUTPATIENT
Start: 2021-07-03 | End: 2021-07-07 | Stop reason: HOSPADM

## 2021-07-03 RX ORDER — IPRATROPIUM BROMIDE AND ALBUTEROL SULFATE 2.5; .5 MG/3ML; MG/3ML
3 SOLUTION RESPIRATORY (INHALATION)
Status: DISCONTINUED | OUTPATIENT
Start: 2021-07-03 | End: 2021-07-07

## 2021-07-03 RX ORDER — LORATADINE 10 MG/1
10 TABLET ORAL DAILY
Status: DISCONTINUED | OUTPATIENT
Start: 2021-07-03 | End: 2021-07-07 | Stop reason: HOSPADM

## 2021-07-03 RX ADMIN — Medication 10 ML: at 21:57

## 2021-07-03 RX ADMIN — ENOXAPARIN SODIUM 40 MG: 40 INJECTION SUBCUTANEOUS at 09:11

## 2021-07-03 RX ADMIN — GUAIFENESIN 600 MG: 600 TABLET, EXTENDED RELEASE ORAL at 21:56

## 2021-07-03 RX ADMIN — IPRATROPIUM BROMIDE AND ALBUTEROL SULFATE 3 ML: .5; 3 SOLUTION RESPIRATORY (INHALATION) at 23:25

## 2021-07-03 RX ADMIN — LORAZEPAM 0.5 MG: 0.5 TABLET ORAL at 13:15

## 2021-07-03 RX ADMIN — IPRATROPIUM BROMIDE AND ALBUTEROL SULFATE 3 ML: .5; 3 SOLUTION RESPIRATORY (INHALATION) at 12:44

## 2021-07-03 RX ADMIN — WATER 1 G: 1 INJECTION INTRAMUSCULAR; INTRAVENOUS; SUBCUTANEOUS at 15:38

## 2021-07-03 RX ADMIN — INSULIN LISPRO 2 UNITS: 100 INJECTION, SOLUTION INTRAVENOUS; SUBCUTANEOUS at 13:42

## 2021-07-03 RX ADMIN — FLUTICASONE PROPIONATE 2 SPRAY: 50 SPRAY, METERED NASAL at 09:15

## 2021-07-03 RX ADMIN — Medication 10 ML: at 15:45

## 2021-07-03 RX ADMIN — BUDESONIDE 500 MCG: 0.5 INHALANT RESPIRATORY (INHALATION) at 19:19

## 2021-07-03 RX ADMIN — LORATADINE 10 MG: 10 TABLET ORAL at 13:13

## 2021-07-03 RX ADMIN — AMLODIPINE BESYLATE 10 MG: 5 TABLET ORAL at 09:11

## 2021-07-03 RX ADMIN — BUDESONIDE 500 MCG: 0.5 INHALANT RESPIRATORY (INHALATION) at 06:48

## 2021-07-03 RX ADMIN — METHYLPREDNISOLONE SODIUM SUCCINATE 30 MG: 40 INJECTION, POWDER, FOR SOLUTION INTRAMUSCULAR; INTRAVENOUS at 06:24

## 2021-07-03 RX ADMIN — INSULIN LISPRO 2 UNITS: 100 INJECTION, SOLUTION INTRAVENOUS; SUBCUTANEOUS at 17:35

## 2021-07-03 RX ADMIN — GUAIFENESIN 600 MG: 600 TABLET, EXTENDED RELEASE ORAL at 09:11

## 2021-07-03 RX ADMIN — WATER 1 G: 1 INJECTION INTRAMUSCULAR; INTRAVENOUS; SUBCUTANEOUS at 09:11

## 2021-07-03 RX ADMIN — WATER 1 G: 1 INJECTION INTRAMUSCULAR; INTRAVENOUS; SUBCUTANEOUS at 00:04

## 2021-07-03 RX ADMIN — INSULIN LISPRO 4 UNITS: 100 INJECTION, SOLUTION INTRAVENOUS; SUBCUTANEOUS at 21:57

## 2021-07-03 RX ADMIN — METHYLPREDNISOLONE SODIUM SUCCINATE 40 MG: 40 INJECTION, POWDER, FOR SOLUTION INTRAMUSCULAR; INTRAVENOUS at 15:37

## 2021-07-03 RX ADMIN — CARBAMIDE PEROXIDE 6.5% 5 DROP: 6.5 LIQUID AURICULAR (OTIC) at 21:56

## 2021-07-03 RX ADMIN — IPRATROPIUM BROMIDE AND ALBUTEROL SULFATE 3 ML: .5; 3 SOLUTION RESPIRATORY (INHALATION) at 16:40

## 2021-07-03 RX ADMIN — IPRATROPIUM BROMIDE AND ALBUTEROL SULFATE 3 ML: .5; 3 SOLUTION RESPIRATORY (INHALATION) at 19:19

## 2021-07-03 RX ADMIN — TAMSULOSIN HYDROCHLORIDE 0.4 MG: 0.4 CAPSULE ORAL at 09:11

## 2021-07-03 RX ADMIN — AZITHROMYCIN MONOHYDRATE 500 MG: 500 INJECTION, POWDER, LYOPHILIZED, FOR SOLUTION INTRAVENOUS at 15:45

## 2021-07-03 RX ADMIN — BENZONATATE 100 MG: 100 CAPSULE ORAL at 13:13

## 2021-07-03 RX ADMIN — IPRATROPIUM BROMIDE AND ALBUTEROL SULFATE 3 ML: .5; 3 SOLUTION RESPIRATORY (INHALATION) at 06:48

## 2021-07-03 RX ADMIN — CARBAMIDE PEROXIDE 6.5% 5 DROP: 6.5 LIQUID AURICULAR (OTIC) at 15:47

## 2021-07-03 NOTE — PROGRESS NOTES
Problem: Chronic Obstructive Pulmonary Disease (COPD)  Goal: *Oxygen saturation during activity within specified parameters  Outcome: Progressing Towards Goal  Goal: *Able to remain out of bed as prescribed  Outcome: Progressing Towards Goal  Goal: *Absence of hypoxia  Outcome: Progressing Towards Goal  Goal: *Optimize nutritional status  Outcome: Progressing Towards Goal     Problem: Patient Education: Go to Patient Education Activity  Goal: Patient/Family Education  Outcome: Progressing Towards Goal     Problem: Falls - Risk of  Goal: *Absence of Falls  Description: Document Christiano Fall Risk and appropriate interventions in the flowsheet.   Outcome: Progressing Towards Goal  Note: Fall Risk Interventions:  Mobility Interventions: Assess mobility with egress test, Communicate number of staff needed for ambulation/transfer, Patient to call before getting OOB, PT Consult for mobility concerns         Medication Interventions: Assess postural VS orthostatic hypotension, Bed/chair exit alarm, Patient to call before getting OOB, Teach patient to arise slowly         History of Falls Interventions: Bed/chair exit alarm         Problem: Patient Education: Go to Patient Education Activity  Goal: Patient/Family Education  Outcome: Progressing Towards Goal     Problem: Patient Education: Go to Patient Education Activity  Goal: Patient/Family Education  Outcome: Progressing Towards Goal     Problem: Patient Education: Go to Patient Education Activity  Goal: Patient/Family Education  Outcome: Progressing Towards Goal

## 2021-07-03 NOTE — PROGRESS NOTES
Occupational Therapy Treatment Attempt     Chart reviewed. Attempted Occupational Therapy Treatment, however, patient unable to be seen due to:  []  Nausea/vomiting  []  Eating  []  Pain  []  Patient too lethargic  []  Off Unit for testing/procedure  []  Dialysis treatment in progress  []  Telemetry Results  [x]  Other: with nursing. Will f/u later as patient's schedule allows.   Cash Gomes, OTR/L

## 2021-07-03 NOTE — PROGRESS NOTES
Problem: Mobility Impaired (Adult and Pediatric)  Goal: *Acute Goals and Plan of Care (Insert Text)  Description: Physical Therapy Goals   Initiated 6/28/2021 and to be accomplished within 5-7 day(s)  1. Patient will move from supine <> sit with mod I in prep for out of bed activity and change of position. 2.  Patient will perform sit<> stand with mod I with LRAD in prep for transfers/ambulation. 3.  Patient will transfer from bed <> chair with mod I with LRAD for time up in chair for completion of ADL activity. 4.  Patient will ambulate 150 feet with S/LRAD for improved functional mobility at discharge. 5.  Patient will ascend/descend 3-5 stairs with handrail(s) with minimal assistance/contact guard assist for home re-entry as needed. Outcome: Progressing Towards Goal   PHYSICAL THERAPY TREATMENT    Patient: Ralph Romero (13 y.o. male)  Date: 7/3/2021  Diagnosis: Dyspnea [R06.00]  COPD (chronic obstructive pulmonary disease) ([facility]) [J44.9]  Sepsis ([facility] [address] [A41.9]  UTI (urinary tract infection) [N39.0] Sepsis ([facility] [address]    Precautions: Fall  PLOF: lives in a [facility] [place], ambulatory with a cane has a rollator    ASSESSMENT:  Pt sitting in chair upon arrival. Very eager to participate. No assistance needed for sit to stand. Ambulated 300ft with personal cane, reciprocal gt pattern, steady slow pace, no LOB or path deviations. Negotiated 11 steps holding R hand rail and cane on the L. Returned to room and left sitting in chair. Pt wheezing throughout session but better than yesterday. Progression toward goals:   [x]      Improving appropriately and progressing toward goals  []      Improving slowly and progressing toward goals  []      Not making progress toward goals and plan of care will be adjusted     PLAN:  Patient continues to benefit from skilled intervention to address the above impairments. Continue treatment per established plan of care.   Discharge Recommendations:  Outpatient  Further Equipment Recommendations for Discharge:  N/A     SUBJECTIVE:   Patient stated .    OBJECTIVE DATA SUMMARY:   Critical Behavior:  Neurologic State: Alert, Eyes open spontaneously  Orientation Level: Oriented X4  Cognition: Appropriate decision making, Appropriate for age attention/concentration, Appropriate safety awareness, Follows commands  Safety/Judgement: Fall prevention  Functional Mobility Training:  Transfers:  Sit to Stand: Independent  Stand to Sit: Independent  Balance:  Sitting: Intact  Standing: Intact; With support   Ambulation/Gait Training:  Distance (ft): 300 Feet (ft)  Assistive Device: Gait belt;Cane, straight  Ambulation - Level of Assistance: Modified independent  Stairs:  Number of Stairs Trained: 11  Stairs - Level of Assistance: Supervision  Rail Use: Right         Pain:  Pain level pre-treatment: 0/10  Pain level post-treatment: 0/10   Pain Intervention(s): Medication (see MAR); Rest, Ice, Repositioning   Response to intervention: Nurse notified, See doc flow    Activity Tolerance:   fair  Please refer to the flowsheet for vital signs taken during this treatment. After treatment:   [x] Patient left in no apparent distress sitting up in chair  [] Patient left in no apparent distress in bed  [] Call bell left within reach  [] Nursing notified  [] Caregiver present  [] Bed alarm activated  [] SCDs applied      COMMUNICATION/EDUCATION:   [x]         Role of Physical Therapy in the acute care setting. [x]         Fall prevention education was provided and the patient/caregiver indicated understanding. [x]         Patient/family have participated as able in working toward goals and plan of care. [x]         Patient/family agree to work toward stated goals and plan of care. []         Patient understands intent and goals of therapy, but is neutral about his/her participation.   []         Patient is unable to participate in stated goals/plan of care: ongoing with therapy staff.  []         Other: Glory Garcia, PTA   Time Calculation: 13 mins

## 2021-07-03 NOTE — PROGRESS NOTES
Urology Progress Note    Subjective:     Daily Progress Note: 7/3/2021 12:24 PM    Mark Hamilton is doing better from a urinary standpoint. His catheter is out. He is able to void. There are some elements of slow starting of the stream, but eventually it crescendos to a normal flow and good volume each void. No dysuria or hematuria. Objective:     Visit Vitals  BP (!) 146/87   Pulse (!) 117   Temp 97.8 °F (36.6 °C)   Resp 22   Ht 5' 7\" (1.702 m)   Wt 77.1 kg (170 lb)   SpO2 100%   BMI 26.63 kg/m²        Temp (24hrs), Av.8 °F (36.6 °C), Min:97.3 °F (36.3 °C), Max:98.1 °F (36.7 °C)      Intake and Output:   1901 -  0700  In: 1400 [P.O.:900; I.V.:500]  Out: 3229 [Urine:3555]  No intake/output data recorded.     Physical Exam:   NAD, conversationa  Resp - A little short of breath following a walk in the hallway  Abd - soft, nd, nt    UOP - clear in the urinal.    Lab/Data Review:  BMP:   Lab Results   Component Value Date/Time     2021 02:00 AM    K 4.0 2021 02:00 AM     2021 02:00 AM    CO2 32 2021 02:00 AM    AGAP 4 2021 02:00 AM     (H) 2021 02:00 AM    BUN 28 (H) 2021 02:00 AM    CREA 1.08 2021 02:00 AM    GFRAA >60 2021 02:00 AM    GFRNA >60 2021 02:00 AM     CBC:   Lab Results   Component Value Date/Time    WBC 14.0 (H) 2021 02:00 AM    HGB 11.8 (L) 2021 02:00 AM    HCT 35.9 (L) 2021 02:00 AM     2021 02:00 AM       Assessment/Plan:     Principal Problem:    Sepsis ([facility] [address] (2021)    Active Problems:    COPD exacerbation ([facility] [address] (2021)      UTI (urinary tract infection) (2021)      Dyspnea (2021)      Urinary retention (2021)      Weakness generalized (2021)      He is no longer in retention and voiding okay    Plan:   Continue flomax  He can be discharged on flomax and f/u in 2-3 weeks with Judith Shah as an outpatient

## 2021-07-03 NOTE — PROGRESS NOTES
Bedside and Verbal shift change report given to Houston Madden RN (oncoming nurse) by Brynn Garcia RN (offgoing nurse). Report included the following information SBAR, Kardex, ED Summary, Intake/Output, MAR, Recent Results, Med Rec Status and Cardiac Rhythm NSR.     1925 Shift assessment complete. Patient is resting quietly with chest rising and falling evenly with no signs of pain. 3333 Patient was having a hard time catching his breath and coughing up mucus. RT was called and patient's O2 was reading at 80s. Patients oxygen was raised to 4L- oxygen up to 95. RT came and gave a treatment. Shift was uneventful    Bedside and Verbal shift change report given to Brynn Garcia RN (oncoming nurse) by Houston Madden RN (offgoing nurse). Report included the following information SBAR, Kardex, ED Summary, Intake/Output, MAR, Recent Results, Med Rec Status and Cardiac Rhythm NSR.

## 2021-07-03 NOTE — PROGRESS NOTES
Hospitalist Progress Note    Patient: Ralph Romero MRN: [id number]  CSN: [id number]    YOB: 1945  Age: 78 y.o.   Sex: male    DOA: 6/27/2021 LOS:  LOS: 6 days          Chief Complaint:    Ac COPD exacerbation      Assessment/Plan        53-year-old male past medical history of hypertension, COPD, Parkinson's and CVA who is being admitted for sepsis secondary to urinary source, urinary tract infection, COPD exacerbation and urinary retention.      Sepsis-ruled out, I think lactic acidosis is due to acute retention, physical stress, and COPD exacerbation w/ bronchitis  Leukocytosis improving   Urine and blood cultures are negative      Continue IV abx for acute bronchitis     Urinary tract infection-pyuria  Continue treatment, cx neg, diaz is out     Urinary retention -  resolved     COPD exacerbation -  Continue IV Solu-Medrol  Add scheduled duoneb  Pulmicort every 12 hours     May have notable anxiety component so ordered ativan low dose PRN    Chest x-ray unremarkable  mucinex  Add claritin     Code Status: DNR/DNI     Not yet ready for d/c  Disposition :  Patient Active Problem List   Diagnosis Code    Syncope R55    Hypertension I10    Chronic obstructive pulmonary disease ([facility] [address] J44.9    Parkinson's disease ([facility] [address] G20    COPD exacerbation ([facility] [address] J44.1    UTI (urinary tract infection) N39.0    Dyspnea R06.00    Sepsis ([address] A41.9    Urinary retention R33.9    Weakness generalized R53.1       Subjective:  Nasal and sinus congestion complaints  Very anxious and gets self worked up while talking and starts coughing more  Cough, congestion      Review of systems:    Constitutional: denies fevers, chills, myalgias  Respiratory: SOB, cough  Cardiovascular: denies chest pain, palpitations  Gastrointestinal: denies nausea, vomiting, diarrhea      Vital signs/Intake and Output:  Visit Vitals  BP (!) 146/87   Pulse (!) 117   Temp 97.8 °F (36.6 °C)   Resp 22   Ht 5' 7\" (1.702 m)   Wt 77.1 kg (170 lb)   SpO2 100%   BMI 26.63 kg/m²     Current Shift:  No intake/output data recorded. Last three shifts:  07/01 1901 - 07/03 0700  In: 1400 [P.O.:900; I.V.:500]  Out: 2864 [Urine:3555]    Exam:    General: anxious, hyper elderly WM, laughing and talking, then starts coughing aggressively and forcing productive cough, alert,  OX3  CVS:Regular rate and rhythm, no M/R/G, S1/S2 heard, no thrill  Lungs:Coarse BS with rhonchi, few exp wheezes  Abdomen: Soft, Nontender, No distention, Normal Bowel sounds, No hepatomegaly  Extremities: No C/C/E, pulses palpable 2+  Neuro:grossly normal , follows commands  Psych:appropriate but hyperactive affect and behavior                Labs: Results:       Chemistry Recent Labs     07/03/21 0200 07/02/21 0205 07/01/21  1950 07/01/21  0650 07/01/21  0650   * 123* 126*   < > 139*    139 140   < > 140   K 4.0 3.6 3.5   < > 3.3*    101 102   < > 104   CO2 32 33* 32   < > 29   BUN 28* 27* 26*   < > 27*   CREA 1.08 1.00 1.25   < > 1.10   CA 7.3* 7.1* 7.5*   < > 7.0*   AGAP 4 5 6   < > 7   BUCR 26* 27* 21*   < > 25*   AP 38* 36*  --   --  37*   TP 5.1* 5.2*  --   --  5.0*   ALB 2.7* 2.9*  --   --  2.8*   GLOB 2.4 2.3  --   --  2.2   AGRAT 1.1 1.3  --   --  1.3    < > = values in this interval not displayed. CBC w/Diff Recent Labs     07/03/21 0200 07/02/21  0205 07/01/21  0650   WBC 14.0* 13.6* 12.6   RBC 3.81* 3.64* 3.49*   HGB 11.8* 11.4* 10.9*   HCT 35.9* 33.7* 32.7*    200 183   GRANS 85* 86* 85*   LYMPH 7* 9* 7*   EOS 0 0 0      Cardiac Enzymes Recent Labs     06/30/21  1820      CKND1 3.0      Coagulation No results for input(s): PTP, INR, APTT, INREXT in the last 72 hours. Lipid Panel No results found for: CHOL, CHOLPOCT, CHOLX, CHLST, CHOLV, 789493, HDL, HDLP, LDL, LDLC, DLDLP, 477366, VLDLC, VLDL, TGLX, TRIGL, TRIGP, TGLPOCT, CHHD, CHHDX   BNP No results for input(s): BNPP in the last 72 hours.    Liver Enzymes Recent Labs 07/03/21  0200   TP 5.1*   ALB 2.7*   AP 38*      Thyroid Studies Lab Results   Component Value Date/Time    TSH 0.92 10/30/2017 02:16 AM        Procedures/imaging: see electronic medical records for all procedures/Xrays and details which were not copied into this note but were reviewed prior to creation of Adrián Burris MD

## 2021-07-04 LAB
GLUCOSE BLD STRIP.AUTO-MCNC: 115 MG/DL (ref 70–110)
GLUCOSE BLD STRIP.AUTO-MCNC: 149 MG/DL (ref 70–110)
GLUCOSE BLD STRIP.AUTO-MCNC: 215 MG/DL (ref 70–110)

## 2021-07-04 PROCEDURE — 94669 MECHANICAL CHEST WALL OSCILL: CPT

## 2021-07-04 PROCEDURE — 74011250636 HC RX REV CODE- 250/636: Performed by: FAMILY MEDICINE

## 2021-07-04 PROCEDURE — 97116 GAIT TRAINING THERAPY: CPT

## 2021-07-04 PROCEDURE — 74011000250 HC RX REV CODE- 250: Performed by: FAMILY MEDICINE

## 2021-07-04 PROCEDURE — 74011000250 HC RX REV CODE- 250: Performed by: HOSPITALIST

## 2021-07-04 PROCEDURE — 94640 AIRWAY INHALATION TREATMENT: CPT

## 2021-07-04 PROCEDURE — 77010033678 HC OXYGEN DAILY

## 2021-07-04 PROCEDURE — 97164 PT RE-EVAL EST PLAN CARE: CPT

## 2021-07-04 PROCEDURE — 74011250637 HC RX REV CODE- 250/637: Performed by: HOSPITALIST

## 2021-07-04 PROCEDURE — 74011636637 HC RX REV CODE- 636/637: Performed by: FAMILY MEDICINE

## 2021-07-04 PROCEDURE — 74011250637 HC RX REV CODE- 250/637: Performed by: FAMILY MEDICINE

## 2021-07-04 PROCEDURE — 74011250637 HC RX REV CODE- 250/637: Performed by: UROLOGY

## 2021-07-04 PROCEDURE — 82962 GLUCOSE BLOOD TEST: CPT

## 2021-07-04 PROCEDURE — 65660000000 HC RM CCU STEPDOWN

## 2021-07-04 PROCEDURE — 74011250636 HC RX REV CODE- 250/636: Performed by: EMERGENCY MEDICINE

## 2021-07-04 PROCEDURE — 74011250636 HC RX REV CODE- 250/636: Performed by: HOSPITALIST

## 2021-07-04 RX ADMIN — LORAZEPAM 0.5 MG: 0.5 TABLET ORAL at 08:56

## 2021-07-04 RX ADMIN — BENZONATATE 100 MG: 100 CAPSULE ORAL at 08:56

## 2021-07-04 RX ADMIN — FLUTICASONE PROPIONATE 2 SPRAY: 50 SPRAY, METERED NASAL at 08:57

## 2021-07-04 RX ADMIN — Medication 10 ML: at 22:02

## 2021-07-04 RX ADMIN — METHYLPREDNISOLONE SODIUM SUCCINATE 40 MG: 40 INJECTION, POWDER, FOR SOLUTION INTRAMUSCULAR; INTRAVENOUS at 08:56

## 2021-07-04 RX ADMIN — AMLODIPINE BESYLATE 10 MG: 5 TABLET ORAL at 08:56

## 2021-07-04 RX ADMIN — LORAZEPAM 0.5 MG: 0.5 TABLET ORAL at 16:44

## 2021-07-04 RX ADMIN — WATER 1 G: 1 INJECTION INTRAMUSCULAR; INTRAVENOUS; SUBCUTANEOUS at 08:56

## 2021-07-04 RX ADMIN — Medication 10 ML: at 14:00

## 2021-07-04 RX ADMIN — GUAIFENESIN 600 MG: 600 TABLET, EXTENDED RELEASE ORAL at 22:02

## 2021-07-04 RX ADMIN — AZITHROMYCIN MONOHYDRATE 500 MG: 500 INJECTION, POWDER, LYOPHILIZED, FOR SOLUTION INTRAVENOUS at 11:31

## 2021-07-04 RX ADMIN — IPRATROPIUM BROMIDE AND ALBUTEROL SULFATE 3 ML: .5; 3 SOLUTION RESPIRATORY (INHALATION) at 03:37

## 2021-07-04 RX ADMIN — ENOXAPARIN SODIUM 40 MG: 40 INJECTION SUBCUTANEOUS at 08:55

## 2021-07-04 RX ADMIN — IPRATROPIUM BROMIDE AND ALBUTEROL SULFATE 3 ML: .5; 3 SOLUTION RESPIRATORY (INHALATION) at 19:31

## 2021-07-04 RX ADMIN — METHYLPREDNISOLONE SODIUM SUCCINATE 20 MG: 40 INJECTION, POWDER, FOR SOLUTION INTRAMUSCULAR; INTRAVENOUS at 16:44

## 2021-07-04 RX ADMIN — BUDESONIDE 500 MCG: 0.5 INHALANT RESPIRATORY (INHALATION) at 19:31

## 2021-07-04 RX ADMIN — IPRATROPIUM BROMIDE AND ALBUTEROL SULFATE 3 ML: .5; 3 SOLUTION RESPIRATORY (INHALATION) at 07:39

## 2021-07-04 RX ADMIN — BUDESONIDE 500 MCG: 0.5 INHALANT RESPIRATORY (INHALATION) at 07:39

## 2021-07-04 RX ADMIN — BENZONATATE 100 MG: 100 CAPSULE ORAL at 16:44

## 2021-07-04 RX ADMIN — IPRATROPIUM BROMIDE AND ALBUTEROL SULFATE 3 ML: .5; 3 SOLUTION RESPIRATORY (INHALATION) at 17:19

## 2021-07-04 RX ADMIN — METHYLPREDNISOLONE SODIUM SUCCINATE 40 MG: 40 INJECTION, POWDER, FOR SOLUTION INTRAMUSCULAR; INTRAVENOUS at 00:55

## 2021-07-04 RX ADMIN — TAMSULOSIN HYDROCHLORIDE 0.4 MG: 0.4 CAPSULE ORAL at 08:56

## 2021-07-04 RX ADMIN — IPRATROPIUM BROMIDE AND ALBUTEROL SULFATE 3 ML: .5; 3 SOLUTION RESPIRATORY (INHALATION) at 15:13

## 2021-07-04 RX ADMIN — IPRATROPIUM BROMIDE AND ALBUTEROL SULFATE 3 ML: .5; 3 SOLUTION RESPIRATORY (INHALATION) at 11:38

## 2021-07-04 RX ADMIN — GUAIFENESIN 600 MG: 600 TABLET, EXTENDED RELEASE ORAL at 08:56

## 2021-07-04 RX ADMIN — WATER 1 G: 1 INJECTION INTRAMUSCULAR; INTRAVENOUS; SUBCUTANEOUS at 16:00

## 2021-07-04 RX ADMIN — LORATADINE 10 MG: 10 TABLET ORAL at 08:56

## 2021-07-04 RX ADMIN — WATER 1 G: 1 INJECTION INTRAMUSCULAR; INTRAVENOUS; SUBCUTANEOUS at 00:55

## 2021-07-04 RX ADMIN — INSULIN LISPRO 4 UNITS: 100 INJECTION, SOLUTION INTRAVENOUS; SUBCUTANEOUS at 15:31

## 2021-07-04 NOTE — PROGRESS NOTES
Bedside and Verbal shift change report given to Shameka Brady RN (oncoming nurse) by Rafa Treviño RN (offgoing nurse). Report included the following information SBAR, Kardex, ED Summary, Intake/Output, MAR, Recent Results, Med Rec Status and Cardiac Rhythm NSR.     1920  Shift assessment complete  Pt resting quietly with eyes closed and chest rising and falling evenly   No c/o pain or signs of distress  Bed locked and in lowest position   Call bell within reach     Shift uneventful     3 [place] [facility] Night Shift Chart Audit    Chart Audit completed? YES            Bedside and Verbal shift change report given to Mamadou Wells RN (oncoming nurse) by Shameka Brady RN (offgoing nurse). Report included the following information SBAR, Kardex, ED Summary, Intake/Output, MAR, Recent Results, Med Rec Status and Cardiac Rhythm NSR.

## 2021-07-04 NOTE — PROGRESS NOTES
Dr. Maik Clement called regarding patient complaint of dyspnea. Dr. Maik Clement ordered a one time albuterol. RT called and came to bedside and gave duo neb treatment along with chest therapy see RT note. Patient continued to be on room air per Dr. Maik Clement verbal order to wean off Oxygen. Patient room air sat 98%.

## 2021-07-04 NOTE — PROGRESS NOTES
Hospitalist Progress Note    Patient: Trena Benitez MRN: [id number]  CSN: [id number]    YOB: 1945  Age: 78 y.o. Sex: male    DOA: 6/27/2021 LOS:  LOS: 7 days          Chief Complaint:    COPD      Assessment/Plan        59-year-old male past medical history of hypertension, COPD, Parkinson's and CVA who is being admitted for sepsis secondary to urinary source, urinary tract infection, COPD exacerbation and urinary retention.      Sepsis-ruled out  COPD exacerbation w/ bronchitis  Improving with steroids, nebs, antibitoics  Urine and blood cultures are negative       Urinary tract infection-pyuria  Continue treatment, cx neg, diaz is out     Urinary retention -  resolved     COPD exacerbation -  Continue IV Solu-Medrol, wean dosing  scheduled duoneb  Pulmicort every 12 hours   Wean from NC 02 as tolerated    We discussed that I do not expect a lot of sputum production from him, the meds are working to help dissipate his congestion and him forcing his cough is not safe     May have notable anxiety component so ordered ativan low dose PRN     Chest x-ray unremarkable  mucinex  Add claritin    Has passed PT  Wean from 02 today    If stable can d/c tomorrow on PO meds     Code Status: DNR/DNI   Disposition :  Patient Active Problem List   Diagnosis Code    Syncope R55    Hypertension I10    Chronic obstructive pulmonary disease ([address] J44.9    Parkinson's disease ([facility] [address] G20    COPD exacerbation ([facility] [address] J44.1    UTI (urinary tract infection) N39.0    Dyspnea R06.00    Sepsis ([address] A41.9    Urinary retention R33.9    Weakness generalized R53.1       Subjective:    I am ok  I have a lot of congestion, I am not coughing it all up!! 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 \"    Review of systems:    Constitutional: denies fevers, chills  Respiratory: cough  Cardiovascular: denies chest pain, palpitations  Gastrointestinal: denies nausea, vomiting, diarrhea      Vital signs/Intake and Output:  Visit Vitals  BP (!) 151/72   Pulse (!) 101   Temp 97.5 °F (36.4 °C)   Resp 22   Ht 5' 7\" (1.702 m)   Wt 77.1 kg (170 lb)   SpO2 100%   BMI 26.63 kg/m²     Current Shift:  07/04 0701 - 07/04 1900  In: 240 [P.O.:240]  Out: 475 [Urine:475]  Last three shifts:  07/02 1901 - 07/04 0700  In: 1100 [P.O.:1100]  Out: 2355 [Urine:2355]    Exam:    General: Well developed, alert, NAD, OX3  CVS:Regular rate and rhythm, no M/R/G, S1/S2 heard, no thrill  Lungs:less wheezing and rhonchi, clearing BS  Abdomen: Soft, Nontender, No distention, Normal Bowel sounds, No hepatomegaly  Extremities: No C/C/E, pulses palpable 2+  Psych:appropriate                Labs: Results:       Chemistry Recent Labs     07/03/21  0200 07/02/21  0205 07/01/21  1950   * 123* 126*    139 140   K 4.0 3.6 3.5    101 102   CO2 32 33* 32   BUN 28* 27* 26*   CREA 1.08 1.00 1.25   CA 7.3* 7.1* 7.5*   AGAP 4 5 6   BUCR 26* 27* 21*   AP 38* 36*  --    TP 5.1* 5.2*  --    ALB 2.7* 2.9*  --    GLOB 2.4 2.3  --    AGRAT 1.1 1.3  --       CBC w/Diff Recent Labs     07/03/21  0200 07/02/21  0205   WBC 14.0* 13.6*   RBC 3.81* 3.64*   HGB 11.8* 11.4*   HCT 35.9* 33.7*    200   GRANS 85* 86*   LYMPH 7* 9*   EOS 0 0      Cardiac Enzymes No results for input(s): CPK, CKND1, RITA in the last 72 hours. No lab exists for component: CKRMB, TROIP   Coagulation No results for input(s): PTP, INR, APTT, INREXT in the last 72 hours. Lipid Panel No results found for: CHOL, CHOLPOCT, CHOLX, CHLST, CHOLV, 675361, HDL, HDLP, LDL, LDLC, DLDLP, 104020, VLDLC, VLDL, TGLX, TRIGL, TRIGP, TGLPOCT, CHHD, CHHDX   BNP No results for input(s): BNPP in the last 72 hours.    Liver Enzymes Recent Labs     07/03/21  0200   TP 5.1*   ALB 2.7*   AP 38*      Thyroid Studies Lab Results   Component Value Date/Time    TSH 0.92 10/30/2017 02:16 AM        Procedures/imaging: see electronic medical records for all procedures/Xrays and details which were not copied into this note but were reviewed prior to creation of Rubina Baig MD

## 2021-07-04 NOTE — PROGRESS NOTES
Problem: Mobility Impaired (Adult and Pediatric)  Goal: *Acute Goals and Plan of Care (Insert Text)  Description: Physical Therapy Goals   Initiated 6/28/2021 and to be accomplished within 5-7 day(s)  1. Patient will move from supine <> sit with mod I in prep for out of bed activity and change of position. 2.  Patient will perform sit<> stand with mod I with LRAD in prep for transfers/ambulation. 3.  Patient will transfer from bed <> chair with mod I with LRAD for time up in chair for completion of ADL activity. 4.  Patient will ambulate 150 feet with S/LRAD for improved functional mobility at discharge. 5.  Patient will ascend/descend 3-5 stairs with handrail(s) with minimal assistance/contact guard assist for home re-entry as needed. Note:     PHYSICAL THERAPY REEVALUATION AND TREATMENT    Patient: Ham Becerril (88 y.o. male)  Date: 7/4/2021  Diagnosis: Dyspnea [R06.00]  COPD (chronic obstructive pulmonary disease) ([facility]) [J44.9]  Sepsis ([address] [A41.9]  UTI (urinary tract infection) [N39.0] Sepsis ([address]       Precautions: Fall    ASSESSMENT:  Pt found sitting in chair on PT entry, reports he has been amb in room today (up ad madeline). Pt demonstrates independence with transfers, amb with SPC and Zonia. Noted pt completed stair negotiation yesterday. Pt with some shortness of breath noted with amb, but decr activity tolerance is likely pt's PLOF. Pt educated on energy conservation techniques and pursed lip breathing. Pt has met goals of care and does not have acute needs for skilled therapy at this time. Pt left sitting in chair, all needs met.    Progression toward goals:   [x]      Improving appropriately and progressing toward goals, goals of care met  []      Improving slowly and progressing toward goals  []      Not making progress toward goals and plan of care will be adjusted     PLAN:  Goals of care met, pt will be discharged from therapy services  Discharge Recommendations: Outpatient  Further Equipment Recommendations for Discharge:  N/A     SUBJECTIVE:   Patient stated I feel good. I've been walking in the room this morning.     OBJECTIVE DATA SUMMARY:   Critical Behavior:  Neurologic State: Alert, Appropriate for age, Eyes open spontaneously  Orientation Level: Oriented X4  Cognition: Appropriate decision making, Appropriate for age attention/concentration, Appropriate safety awareness, Follows commands  Safety/Judgement: Fall prevention  Functional Mobility Training:  Transfers:  Sit to Stand: Independent  Stand to Sit: Independent  Balance:  Sitting: Intact  Standing: Intact; With support  Ambulation/Gait Training:  Distance (ft): 300 Feet (ft)  Assistive Device: Cane, straight;Gait belt  Ambulation - Level of Assistance: Modified independent  Gait Abnormalities: Decreased step clearance  Pain:  Pain level pre-treatment: 0/10  Pain level post-treatment: 0/10   Pain Intervention(s): Medication (see MAR); Rest, Ice, Repositioning   Response to intervention: Nurse notified, See doc flow  Activity Tolerance:   Pt with some shortness of breath noted, but able to amb 300 ft in samuels with SPC, no LOB. Please refer to the flowsheet for vital signs taken during this treatment. After treatment:   [x] Patient left in no apparent distress sitting up in chair  [] Patient left in no apparent distress in bed  [x] Call bell left within reach  [x] Nursing notified  [] Caregiver present  [] Bed alarm activated  [] SCDs applied      COMMUNICATION/EDUCATION:   [x]         Role of Physical Therapy in the acute care setting. [x]         Fall prevention education was provided and the patient/caregiver indicated understanding. [x]         Patient/family have participated as able in working toward goals and plan of care. [x]         Patient/family agree to work toward stated goals and plan of care.   []         Patient understands intent and goals of therapy, but is neutral about his/her participation. []         Patient is unable to participate in stated goals/plan of care: ongoing with therapy staff.  []         Other:         Zuleika Midget   Time Calculation: 23 mins

## 2021-07-04 NOTE — PROGRESS NOTES
Problem: Chronic Obstructive Pulmonary Disease (COPD)  Goal: *Oxygen saturation during activity within specified parameters  Outcome: Progressing Towards Goal  Goal: *Able to remain out of bed as prescribed  Outcome: Progressing Towards Goal  Goal: *Absence of hypoxia  Outcome: Progressing Towards Goal  Goal: *Optimize nutritional status  Outcome: Progressing Towards Goal     Problem: Patient Education: Go to Patient Education Activity  Goal: Patient/Family Education  Outcome: Progressing Towards Goal     Problem: Falls - Risk of  Goal: *Absence of Falls  Description: Document Christiano Fall Risk and appropriate interventions in the flowsheet.   Outcome: Progressing Towards Goal  Note: Fall Risk Interventions:  Mobility Interventions: Assess mobility with egress test         Medication Interventions: Teach patient to arise slowly    Elimination Interventions: Call light in reach, Patient to call for help with toileting needs, Urinal in reach    History of Falls Interventions: Bed/chair exit alarm         Problem: Patient Education: Go to Patient Education Activity  Goal: Patient/Family Education  Outcome: Progressing Towards Goal     Problem: Patient Education: Go to Patient Education Activity  Goal: Patient/Family Education  Outcome: Progressing Towards Goal     Problem: Patient Education: Go to Patient Education Activity  Goal: Patient/Family Education  Outcome: Progressing Towards Goal

## 2021-07-04 NOTE — PROGRESS NOTES
Bedside and Verbal shift change report given to Nii Tipton RN (oncoming nurse) by Ramirez So RN (offgoing nurse). Report included the following information SBAR, Kardex, ED Summary, Intake/Output, MAR, Recent Results, Med Rec Status and Cardiac Rhythm NSR. \    2041  Shift assessment complete  Pt resting quietly with eyes open and chest rising and falling evenly  No c/o pain or signs of distress  Bed locked and in lowest position   Call bell within reach       Shift uneventful     3 [facility] Cardiac/Medical Night Shift Chart Audit    Chart Audit completed? YES        Bedside and Verbal shift change report given to Ramirez So RN (oncoming nurse) by Nii Tipton RN (offgoing nurse). Report included the following information SBAR, Kardex, ED Summary, Intake/Output, MAR, Recent Results, Med Rec Status and Cardiac Rhythm NSR.

## 2021-07-04 NOTE — PROGRESS NOTES
Patient started on metaneb due to difficulty getting up secretions even with flutter valve.  Patient tolerated well

## 2021-07-05 ENCOUNTER — APPOINTMENT (OUTPATIENT)
Dept: GENERAL RADIOLOGY | Age: 76
DRG: 191 | End: 2021-07-05
Attending: HOSPITALIST
Payer: MEDICARE

## 2021-07-05 PROBLEM — J20.9 ACUTE BRONCHITIS: Status: ACTIVE | Noted: 2021-07-05

## 2021-07-05 LAB
GLUCOSE BLD STRIP.AUTO-MCNC: 119 MG/DL (ref 70–110)
GLUCOSE BLD STRIP.AUTO-MCNC: 137 MG/DL (ref 70–110)
GLUCOSE BLD STRIP.AUTO-MCNC: 152 MG/DL (ref 70–110)
GLUCOSE BLD STRIP.AUTO-MCNC: 152 MG/DL (ref 70–110)

## 2021-07-05 PROCEDURE — 71046 X-RAY EXAM CHEST 2 VIEWS: CPT

## 2021-07-05 PROCEDURE — 74011250636 HC RX REV CODE- 250/636: Performed by: FAMILY MEDICINE

## 2021-07-05 PROCEDURE — 74011250636 HC RX REV CODE- 250/636: Performed by: HOSPITALIST

## 2021-07-05 PROCEDURE — 74011000250 HC RX REV CODE- 250: Performed by: HOSPITALIST

## 2021-07-05 PROCEDURE — 74011636637 HC RX REV CODE- 636/637: Performed by: FAMILY MEDICINE

## 2021-07-05 PROCEDURE — 94669 MECHANICAL CHEST WALL OSCILL: CPT

## 2021-07-05 PROCEDURE — 65660000000 HC RM CCU STEPDOWN

## 2021-07-05 PROCEDURE — 97535 SELF CARE MNGMENT TRAINING: CPT

## 2021-07-05 PROCEDURE — 74011000250 HC RX REV CODE- 250: Performed by: FAMILY MEDICINE

## 2021-07-05 PROCEDURE — 82962 GLUCOSE BLOOD TEST: CPT

## 2021-07-05 PROCEDURE — 74011250637 HC RX REV CODE- 250/637: Performed by: UROLOGY

## 2021-07-05 PROCEDURE — 74011250637 HC RX REV CODE- 250/637: Performed by: HOSPITALIST

## 2021-07-05 PROCEDURE — 94640 AIRWAY INHALATION TREATMENT: CPT

## 2021-07-05 RX ORDER — ACETYLCYSTEINE 100 MG/ML
4 SOLUTION ORAL; RESPIRATORY (INHALATION)
Status: DISCONTINUED | OUTPATIENT
Start: 2021-07-05 | End: 2021-07-07

## 2021-07-05 RX ORDER — LEVOFLOXACIN 5 MG/ML
500 INJECTION, SOLUTION INTRAVENOUS EVERY 24 HOURS
Status: DISCONTINUED | OUTPATIENT
Start: 2021-07-05 | End: 2021-07-07 | Stop reason: HOSPADM

## 2021-07-05 RX ADMIN — IPRATROPIUM BROMIDE AND ALBUTEROL SULFATE 3 ML: .5; 3 SOLUTION RESPIRATORY (INHALATION) at 19:38

## 2021-07-05 RX ADMIN — METHYLPREDNISOLONE SODIUM SUCCINATE 20 MG: 40 INJECTION, POWDER, FOR SOLUTION INTRAMUSCULAR; INTRAVENOUS at 16:44

## 2021-07-05 RX ADMIN — WATER 1 G: 1 INJECTION INTRAMUSCULAR; INTRAVENOUS; SUBCUTANEOUS at 09:44

## 2021-07-05 RX ADMIN — ACETYLCYSTEINE 400 MG: 100 SOLUTION ORAL; RESPIRATORY (INHALATION) at 19:39

## 2021-07-05 RX ADMIN — ACETYLCYSTEINE 400 MG: 100 SOLUTION ORAL; RESPIRATORY (INHALATION) at 13:40

## 2021-07-05 RX ADMIN — BUDESONIDE 500 MCG: 0.5 INHALANT RESPIRATORY (INHALATION) at 19:38

## 2021-07-05 RX ADMIN — IPRATROPIUM BROMIDE AND ALBUTEROL SULFATE 3 ML: .5; 3 SOLUTION RESPIRATORY (INHALATION) at 00:00

## 2021-07-05 RX ADMIN — IPRATROPIUM BROMIDE AND ALBUTEROL SULFATE 3 ML: .5; 3 SOLUTION RESPIRATORY (INHALATION) at 16:47

## 2021-07-05 RX ADMIN — AMLODIPINE BESYLATE 10 MG: 5 TABLET ORAL at 09:38

## 2021-07-05 RX ADMIN — BUDESONIDE 500 MCG: 0.5 INHALANT RESPIRATORY (INHALATION) at 08:03

## 2021-07-05 RX ADMIN — IPRATROPIUM BROMIDE AND ALBUTEROL SULFATE 3 ML: .5; 3 SOLUTION RESPIRATORY (INHALATION) at 04:00

## 2021-07-05 RX ADMIN — IPRATROPIUM BROMIDE AND ALBUTEROL SULFATE 3 ML: .5; 3 SOLUTION RESPIRATORY (INHALATION) at 08:04

## 2021-07-05 RX ADMIN — LORATADINE 10 MG: 10 TABLET ORAL at 09:38

## 2021-07-05 RX ADMIN — Medication 10 ML: at 21:28

## 2021-07-05 RX ADMIN — TAMSULOSIN HYDROCHLORIDE 0.4 MG: 0.4 CAPSULE ORAL at 09:38

## 2021-07-05 RX ADMIN — METHYLPREDNISOLONE SODIUM SUCCINATE 20 MG: 40 INJECTION, POWDER, FOR SOLUTION INTRAMUSCULAR; INTRAVENOUS at 00:17

## 2021-07-05 RX ADMIN — LORAZEPAM 0.5 MG: 0.5 TABLET ORAL at 15:14

## 2021-07-05 RX ADMIN — Medication 10 ML: at 13:22

## 2021-07-05 RX ADMIN — INSULIN LISPRO 2 UNITS: 100 INJECTION, SOLUTION INTRAVENOUS; SUBCUTANEOUS at 13:21

## 2021-07-05 RX ADMIN — WATER 1 G: 1 INJECTION INTRAMUSCULAR; INTRAVENOUS; SUBCUTANEOUS at 00:17

## 2021-07-05 RX ADMIN — METHYLPREDNISOLONE SODIUM SUCCINATE 20 MG: 40 INJECTION, POWDER, FOR SOLUTION INTRAMUSCULAR; INTRAVENOUS at 09:44

## 2021-07-05 RX ADMIN — GUAIFENESIN 600 MG: 600 TABLET, EXTENDED RELEASE ORAL at 21:01

## 2021-07-05 RX ADMIN — INSULIN LISPRO 2 UNITS: 100 INJECTION, SOLUTION INTRAVENOUS; SUBCUTANEOUS at 06:39

## 2021-07-05 RX ADMIN — IPRATROPIUM BROMIDE AND ALBUTEROL SULFATE 3 ML: .5; 3 SOLUTION RESPIRATORY (INHALATION) at 13:40

## 2021-07-05 RX ADMIN — FLUTICASONE PROPIONATE 2 SPRAY: 50 SPRAY, METERED NASAL at 09:53

## 2021-07-05 RX ADMIN — GUAIFENESIN 600 MG: 600 TABLET, EXTENDED RELEASE ORAL at 09:38

## 2021-07-05 RX ADMIN — LEVOFLOXACIN 500 MG: 500 INJECTION, SOLUTION INTRAVENOUS at 16:49

## 2021-07-05 RX ADMIN — ENOXAPARIN SODIUM 40 MG: 40 INJECTION SUBCUTANEOUS at 09:40

## 2021-07-05 NOTE — PROGRESS NOTES
19:40 Assessment completed. Lungs are coarse bilat. Denies CP,pressure, or SOB. Offers 0 c/o pain or discomfort. Resting quietly in bed x for voiding per BR w/o difficulty. 22:35 Shift assessment completed. See nsg flow sheet for details. 02:45  Reassessed with 0 changes noted. Resting quietly in bed with eyes closed x for voiding per BR w/o difficulty. 07:15 Bedside and Verbal shift change report given to Otoniel Oleary RN (oncoming nurse) by Finn Ramon RN (offgoing nurse). Report included the following information SBAR.

## 2021-07-05 NOTE — PROGRESS NOTES
Problem: Chronic Obstructive Pulmonary Disease (COPD)  Goal: *Oxygen saturation during activity within specified parameters  Outcome: Progressing Towards Goal  Goal: *Able to remain out of bed as prescribed  Outcome: Progressing Towards Goal  Goal: *Absence of hypoxia  Outcome: Progressing Towards Goal  Goal: *Optimize nutritional status  Outcome: Progressing Towards Goal     Problem: Patient Education: Go to Patient Education Activity  Goal: Patient/Family Education  Outcome: Progressing Towards Goal     Problem: Falls - Risk of  Goal: *Absence of Falls  Description: Document Christiano Fall Risk and appropriate interventions in the flowsheet.   Outcome: Progressing Towards Goal  Note: Fall Risk Interventions:  Mobility Interventions: Patient to call before getting OOB, OT consult for ADLs, PT Consult for mobility concerns, PT Consult for assist device competence         Medication Interventions: Patient to call before getting OOB, Teach patient to arise slowly    Elimination Interventions: Call light in reach, Patient to call for help with toileting needs    History of Falls Interventions: Bed/chair exit alarm         Problem: Patient Education: Go to Patient Education Activity  Goal: Patient/Family Education  Outcome: Progressing Towards Goal

## 2021-07-05 NOTE — ROUTINE PROCESS
Bedside and Verbal shift change report given to TAHIRA Keys R.N. (oncoming nurse) by MICHELLE Swartz R.N. (offgoing nurse). Report included the following information SBAR, Kardex, Intake/Output, MAR, Accordion, Recent Results, and Med Rec Status.

## 2021-07-05 NOTE — PROGRESS NOTES
Problem: Self Care Deficits Care Plan (Adult)  Goal: *Acute Goals and Plan of Care (Insert Text)  Description: Occupational Therapy Goals  Initiated 6/28/2021 within 7 day(s). 1.  Patient will perform grooming with supervision/set-up standing at sink for 5 minutes or more. 2.  Patient will perform upper body dressing with independence. 3.  Patient will perform lower body dressing with supervision/set-up. 4.  Patient will perform toilet transfers with supervision/set-up. 5.  Patient will perform all aspects of toileting with supervision/set-up. 6.  Patient will participate in upper extremity therapeutic exercise/activities with supervision/set-up for 10 minutes. 7.  Patient will utilize energy conservation techniques during functional activities with verbal, visual, and tactile cues. Outcome: Progressing Towards Goal   OCCUPATIONAL THERAPY TREATMENT    Patient: Yvonne Sutton (13 y.o. male)  Date: 7/5/2021  Diagnosis: Dyspnea [R06.00]  COPD (chronic obstructive pulmonary disease) (HCC) [J44.9]  Sepsis ([address] [A41.9]  UTI (urinary tract infection) [N39.0] Sepsis ([facility] [address]       Precautions: Fall    Chart, occupational therapy assessment, plan of care, and goals were reviewed. ASSESSMENT:  Pt found supine in bed, reporting pain 0/10, agreeable to therapy. Pt talkative throughout session, requiring vc to maintain attention on task and to focus on breathing during activity. Pt independent for bed mobility, and mod I for STS. Pt ambulated to bathroom with supervision using SPC. Pt had occasional coughing fits and relied on furniture/grab bars for support. Pt reports he had been completing self care tasks at sink, however fatigues easily and needs to rest. Educated pt on energy conservation/work simplification techniques for both the hospital environment and when at home, pt not receptive on home safety education and perseverates on small size of home.  Pt appears to be close to baseline, pt may benefit from functional endurance activities/continued education on energy conservation if pt is receptive. Pt left seated EOB, call bell/phone within reach. Progression toward goals:  [x]          Improving appropriately and progressing toward goals  []          Improving slowly and progressing toward goals  []          Not making progress toward goals and plan of care will be adjusted     PLAN:  Patient continues to benefit from skilled intervention to address the above impairments. Continue treatment per established plan of care. Discharge Recommendations:  Outpatient vs. Home health  Further Equipment Recommendations for Discharge:  N/A     SUBJECTIVE:   Patient stated I can't do anything in my RV, you don't understand the size of it.     OBJECTIVE DATA SUMMARY:   Cognitive/Behavioral Status:  Neurologic State: Alert  Orientation Level: Oriented X4  Cognition: Follows commands  Safety/Judgement: Awareness of environment    Functional Mobility and Transfers for ADLs:   Bed Mobility:  Supine to Sit: Independent  Sit to Supine: Independent  Scooting: Supervision     Transfers:  Sit to Stand: Modified independent   Bathroom Mobility: Supervision/set up     Balance:  Sitting: Intact  Standing: Intact; With support    ADL Intervention:  Lower Body Dressing Assistance  Dressing Assistance: Stand-by assistance  Socks: Stand-by assistance  Leg Crossed Method Used: Yes  Position Performed: Seated edge of bed  Cues: Verbal cues provided    Cognitive Retraining  Safety/Judgement: Awareness of environment    Pain:  Pain level pre-treatment: 0/10   Pain level post-treatment: 0/10  Pain Intervention(s): Rest, Ice, Repositioning   Response to intervention: Nurse notified, See doc flow sheet    Activity Tolerance:    Fair-. Pt able to stand ~5 minutes. Pt limited by endurance, strength    Please refer to the flowsheet for vital signs taken during this treatment.   After treatment:   []  Patient left in no apparent distress sitting up in chair  [x]  Patient left in no apparent distress seated EOB  [x]  Call bell left within reach  [x]  Nursing notified  []  Caregiver present  []  Bed alarm activated    COMMUNICATION/EDUCATION:   [x] Role of Occupational Therapy in the acute care setting  [x] Home safety education was provided and the patient/caregiver indicated understanding. [x] Patient/family have participated as able in working towards goals and plan of care. [x] Patient/family agree to work toward stated goals and plan of care. [] Patient understands intent and goals of therapy, but is neutral about his/her participation. [] Patient is unable to participate in goal setting and plan of care.       Thank you for this referral.  Romel Muse, OTR/L  Time Calculation: 25 mins

## 2021-07-05 NOTE — PROGRESS NOTES
Hospitalist Progress Note    Patient: Corona Ureña MRN: [id number]  CSN: [id number]    YOB: 1945  Age: 78 y.o.   Sex: male    DOA: 6/27/2021 LOS:  LOS: 8 days          Chief Complaint:    cough      Assessment/Plan     79-year-old male past medical history of hypertension, COPD, Parkinson's and CVA who is being admitted for sepsis secondary to urinary source, urinary tract infection, COPD exacerbation and urinary retention.      Sepsis-ruled out  COPD exacerbation w/ bronchitis-continued wheezing and chest congestion  Ordered CXR-no pneumonia seen  Change abx to just levaquin  contiue steroids IV  Add mucomyst to pulmicort and duoneb as having continued mucu production and coughing spells  Also chest therapy as per RT    Urine and blood cultures are negative       Urinary tract infection-pyuria  Treated    HTN-continue current meds     Urinary retention -  resolved      We discussed that I do not expect a lot of sputum production from him, the meds are working to help dissipate his congestion and him forcing his cough is not safe     May have notable anxiety component so ordered ativan low dose PRN     Has passed PT  Wean from 02     Continue nebs, steroids, abx, his bronchitis with COPD continues to be limiting factor, CXR unrevealing, he is not currently hypoxic, continue inpatient treatment  Disposition :  Patient Active Problem List   Diagnosis Code    Syncope R55    Hypertension I10    Chronic obstructive pulmonary disease ([facility] [address] J44.9    Parkinson's disease ([facility] [address] G20    COPD exacerbation ([facility] [address] J44.1    UTI (urinary tract infection) N39.0    Dyspnea R06.00    Sepsis ([address] A41.9    Urinary retention R33.9    Weakness generalized R53.1       Subjective:  I am still coughing, one little bit of mucus coming at a time  Chest vibratory therapy started by RT and is helping  Continued wheezing  But off 02    Review of systems:    Constitutional: denies fevers, chills  Respiratory: SOB, cough  Cardiovascular: denies chest pain, palpitations  Gastrointestinal: denies nausea, vomiting, diarrhea      Vital signs/Intake and Output:  Visit Vitals  BP (!) 158/63   Pulse (!) 123   Temp 98.1 °F (36.7 °C)   Resp 20   Ht 5' 7\" (1.702 m)   Wt 86.8 kg (191 lb 4.8 oz)   SpO2 98%   BMI 29.96 kg/m²     Current Shift:  No intake/output data recorded. Last three shifts:  07/03 1901 - 07/05 0700  In: 1030 [P.O.:780; I.V.:250]  Out: 1975 [Urine:1975]    Exam:    General: Well developed, alert, NAD, OX3  CVS:Regular rate and rhythm, no M/R/G, S1/S2 heard, no thrill  Lungs:Coarse rhonchi and wheezes BL  Abdomen: Soft, Nontender, No distention, Normal Bowel sounds, No hepatomegaly  Extremities: No C/C/E, pulses palpable 2+  Neuro:grossly normal , follows commands  Psych:appropriate                Labs: Results:       Chemistry Recent Labs     07/03/21  0200   *      K 4.0      CO2 32   BUN 28*   CREA 1.08   CA 7.3*   AGAP 4   BUCR 26*   AP 38*   TP 5.1*   ALB 2.7*   GLOB 2.4   AGRAT 1.1      CBC w/Diff Recent Labs     07/03/21  0200   WBC 14.0*   RBC 3.81*   HGB 11.8*   HCT 35.9*      GRANS 85*   LYMPH 7*   EOS 0      Cardiac Enzymes No results for input(s): CPK, CKND1, RITA in the last 72 hours. No lab exists for component: CKRMB, TROIP   Coagulation No results for input(s): PTP, INR, APTT, INREXT in the last 72 hours. Lipid Panel No results found for: CHOL, CHOLPOCT, CHOLX, CHLST, CHOLV, 605182, HDL, HDLP, LDL, LDLC, DLDLP, 474448, VLDLC, VLDL, TGLX, TRIGL, TRIGP, TGLPOCT, CHHD, CHHDX   BNP No results for input(s): BNPP in the last 72 hours.    Liver Enzymes Recent Labs     07/03/21  0200   TP 5.1*   ALB 2.7*   AP 38*      Thyroid Studies Lab Results   Component Value Date/Time    TSH 0.92 10/30/2017 02:16 AM        Procedures/imaging: see electronic medical records for all procedures/Xrays and details which were not copied into this note but were reviewed prior to creation of Marcial Valentine MD

## 2021-07-05 NOTE — PROGRESS NOTES
Comprehensive Nutrition Assessment    Type and Reason for Visit: Initial, RD nutrition re-screen/LOS    Nutrition Recommendations/Plan: Other: continue w/ POC    Nutrition Assessment:  PMHx of HTN, COPD, Parkinson's disease. Pt admitted w/ Sepsis-ruled out, COPD exacerbation w/ bronchitis, UTI, urinary retention-resolved. Estimated Daily Nutrient Needs:  Energy (kcal): (P) 1879; Weight Used for Energy Requirements: (P) Current  Protein (g): (P) 69-87; Weight Used for Protein Requirements: (P) Current (0.8-1g)  Fluid (ml/day): (P) 1879; Method Used for Fluid Requirements: (P) 1 ml/kcal    Nutrition Related Findings:  (P) Labs and meds reviewed: miralax, flomax, melatonin. No BM noted. Noted PO intake per nursing documentation: % at most meals. Pt is eating good. Pt stated he loves the food and cooking. Wounds:    None       Current Nutrition Therapies:  ADULT DIET Regular    Anthropometric Measures:  Height:  5' 7\" (170.2 cm)  Current Body Wt:  86.8 kg (191 lb 5.8 oz)   Admission Body Wt:       Usual Body Wt:  79.4 kg (175 lb) (7/2020)     Ideal Body Wt:  148 lbs:  129.3 %   Adjusted Body Weight:   ; Weight Adjustment for: No adjustment   Adjusted BMI:       BMI Category:  Obese class 1 (BMI 30.0-34. 9)       Nutrition Diagnosis:   (P) Overweight/obesity related to (P) excessive energy intake as evidenced by (P) BMI, other (specify) (BMI of 30. PO intake %. )    Nutrition Interventions:   Food and/or Nutrient Delivery: (P) Continue current diet  Nutrition Education and Counseling: (P) No recommendations at this time  Coordination of Nutrition Care: (P) Continue to monitor while inpatient    Goals:  (P) Encourage pt to maintain weight within the next 7 days.  Continue to enocourage PO intake >75% at most meals throughout the next 7 days       Nutrition Monitoring and Evaluation:   Behavioral-Environmental Outcomes: (P) None identified  Food/Nutrient Intake Outcomes: (P) Food and nutrient intake  Physical Signs/Symptoms Outcomes: (P) Biochemical data, Skin, Weight, GI status, Nausea/vomiting    Discharge Planning:    (P) Continue current diet     Electronically signed by Micheal Christianson RD on 7/5/2021 at 1:12 PM

## 2021-07-05 NOTE — PROGRESS NOTES
Problem: Chronic Obstructive Pulmonary Disease (COPD)  Goal: *Oxygen saturation during activity within specified parameters  Outcome: Progressing Towards Goal  Goal: *Able to remain out of bed as prescribed  Outcome: Progressing Towards Goal  Goal: *Absence of hypoxia  Outcome: Progressing Towards Goal  Goal: *Optimize nutritional status  Outcome: Progressing Towards Goal     Problem: Patient Education: Go to Patient Education Activity  Goal: Patient/Family Education  Outcome: Progressing Towards Goal     Problem: Falls - Risk of  Goal: *Absence of Falls  Description: Document Christiano Fall Risk and appropriate interventions in the flowsheet.   Outcome: Progressing Towards Goal  Note: Fall Risk Interventions:  Mobility Interventions: Assess mobility with egress test, Communicate number of staff needed for ambulation/transfer, Patient to call before getting OOB         Medication Interventions: Teach patient to arise slowly    Elimination Interventions: Urinal in reach, Call light in reach    History of Falls Interventions: Bed/chair exit alarm         Problem: Patient Education: Go to Patient Education Activity  Goal: Patient/Family Education  Outcome: Progressing Towards Goal     Problem: Patient Education: Go to Patient Education Activity  Goal: Patient/Family Education  Outcome: Progressing Towards Goal

## 2021-07-05 NOTE — PROGRESS NOTES
0738:Received verbal bedside report from off going nurse FANNY Machado R.N. Patient care received. Patient alert and oriented x 4. Patient resting in bed denies pain. Patient stable. Call light with in reach bed in lowest position.

## 2021-07-06 LAB
GLUCOSE BLD STRIP.AUTO-MCNC: 111 MG/DL (ref 70–110)
GLUCOSE BLD STRIP.AUTO-MCNC: 131 MG/DL (ref 70–110)
GLUCOSE BLD STRIP.AUTO-MCNC: 162 MG/DL (ref 70–110)
GLUCOSE BLD STRIP.AUTO-MCNC: 198 MG/DL (ref 70–110)

## 2021-07-06 PROCEDURE — 97530 THERAPEUTIC ACTIVITIES: CPT

## 2021-07-06 PROCEDURE — 74011250636 HC RX REV CODE- 250/636: Performed by: FAMILY MEDICINE

## 2021-07-06 PROCEDURE — 82962 GLUCOSE BLOOD TEST: CPT

## 2021-07-06 PROCEDURE — 65660000000 HC RM CCU STEPDOWN

## 2021-07-06 PROCEDURE — 74011000250 HC RX REV CODE- 250: Performed by: FAMILY MEDICINE

## 2021-07-06 PROCEDURE — 74011250637 HC RX REV CODE- 250/637: Performed by: HOSPITALIST

## 2021-07-06 PROCEDURE — 74011000250 HC RX REV CODE- 250: Performed by: HOSPITALIST

## 2021-07-06 PROCEDURE — 74011250636 HC RX REV CODE- 250/636: Performed by: HOSPITALIST

## 2021-07-06 PROCEDURE — 74011250637 HC RX REV CODE- 250/637: Performed by: UROLOGY

## 2021-07-06 PROCEDURE — 94640 AIRWAY INHALATION TREATMENT: CPT

## 2021-07-06 PROCEDURE — 94669 MECHANICAL CHEST WALL OSCILL: CPT

## 2021-07-06 PROCEDURE — 74011636637 HC RX REV CODE- 636/637: Performed by: FAMILY MEDICINE

## 2021-07-06 RX ADMIN — IPRATROPIUM BROMIDE AND ALBUTEROL SULFATE 3 ML: .5; 3 SOLUTION RESPIRATORY (INHALATION) at 07:23

## 2021-07-06 RX ADMIN — FLUTICASONE PROPIONATE 2 SPRAY: 50 SPRAY, METERED NASAL at 09:00

## 2021-07-06 RX ADMIN — ENOXAPARIN SODIUM 40 MG: 40 INJECTION SUBCUTANEOUS at 08:07

## 2021-07-06 RX ADMIN — TAMSULOSIN HYDROCHLORIDE 0.4 MG: 0.4 CAPSULE ORAL at 08:07

## 2021-07-06 RX ADMIN — Medication 10 ML: at 05:14

## 2021-07-06 RX ADMIN — IPRATROPIUM BROMIDE AND ALBUTEROL SULFATE 3 ML: .5; 3 SOLUTION RESPIRATORY (INHALATION) at 11:22

## 2021-07-06 RX ADMIN — IPRATROPIUM BROMIDE AND ALBUTEROL SULFATE 3 ML: .5; 3 SOLUTION RESPIRATORY (INHALATION) at 00:00

## 2021-07-06 RX ADMIN — INSULIN LISPRO 2 UNITS: 100 INJECTION, SOLUTION INTRAVENOUS; SUBCUTANEOUS at 22:00

## 2021-07-06 RX ADMIN — IPRATROPIUM BROMIDE AND ALBUTEROL SULFATE 3 ML: .5; 3 SOLUTION RESPIRATORY (INHALATION) at 04:00

## 2021-07-06 RX ADMIN — IPRATROPIUM BROMIDE AND ALBUTEROL SULFATE 3 ML: .5; 3 SOLUTION RESPIRATORY (INHALATION) at 15:36

## 2021-07-06 RX ADMIN — INSULIN LISPRO 2 UNITS: 100 INJECTION, SOLUTION INTRAVENOUS; SUBCUTANEOUS at 11:30

## 2021-07-06 RX ADMIN — LEVOFLOXACIN 500 MG: 500 INJECTION, SOLUTION INTRAVENOUS at 13:48

## 2021-07-06 RX ADMIN — ACETYLCYSTEINE 400 MG: 100 SOLUTION ORAL; RESPIRATORY (INHALATION) at 07:23

## 2021-07-06 RX ADMIN — BUDESONIDE 500 MCG: 0.5 INHALANT RESPIRATORY (INHALATION) at 20:41

## 2021-07-06 RX ADMIN — POLYETHYLENE GLYCOL 3350 17 G: 17 POWDER, FOR SOLUTION ORAL at 08:07

## 2021-07-06 RX ADMIN — ACETYLCYSTEINE 400 MG: 100 SOLUTION ORAL; RESPIRATORY (INHALATION) at 15:36

## 2021-07-06 RX ADMIN — METHYLPREDNISOLONE SODIUM SUCCINATE 20 MG: 40 INJECTION, POWDER, FOR SOLUTION INTRAMUSCULAR; INTRAVENOUS at 08:08

## 2021-07-06 RX ADMIN — Medication 10 ML: at 22:00

## 2021-07-06 RX ADMIN — GUAIFENESIN 600 MG: 600 TABLET, EXTENDED RELEASE ORAL at 08:07

## 2021-07-06 RX ADMIN — IPRATROPIUM BROMIDE AND ALBUTEROL SULFATE 3 ML: .5; 3 SOLUTION RESPIRATORY (INHALATION) at 20:41

## 2021-07-06 RX ADMIN — AMLODIPINE BESYLATE 10 MG: 5 TABLET ORAL at 08:07

## 2021-07-06 RX ADMIN — ACETYLCYSTEINE 400 MG: 100 SOLUTION ORAL; RESPIRATORY (INHALATION) at 20:41

## 2021-07-06 RX ADMIN — LORATADINE 10 MG: 10 TABLET ORAL at 08:07

## 2021-07-06 RX ADMIN — GUAIFENESIN 600 MG: 600 TABLET, EXTENDED RELEASE ORAL at 21:33

## 2021-07-06 RX ADMIN — BUDESONIDE 500 MCG: 0.5 INHALANT RESPIRATORY (INHALATION) at 07:23

## 2021-07-06 RX ADMIN — LORAZEPAM 0.5 MG: 0.5 TABLET ORAL at 21:33

## 2021-07-06 RX ADMIN — METHYLPREDNISOLONE SODIUM SUCCINATE 20 MG: 40 INJECTION, POWDER, FOR SOLUTION INTRAMUSCULAR; INTRAVENOUS at 00:44

## 2021-07-06 NOTE — PROGRESS NOTES
0700 Bedside and Verbal shift change report given to TRAVON Weems (oncoming nurse) by Lily Chavez. Narciso Delgadillo, DARION (offgoing nurse). Report included the following information SBAR, Kardex, Intake/Output and Recent Results. 0800 Shift assessment completed, see flowsheet. No c/o pain or SOB. No s/s of distress or discomfort. 1200 Reassessment, no changes. 1600 Patient assisted on walk around the unit. Began inappropriately and uncontrollably laughing. Otherwise, tolerated walk fine. VS stable. 1900 Bedside and Verbal shift change report given to SUHAIL Rodriguez RN (oncoming nurse) by Cat Vallecillo RN (offgoing nurse). Report included the following information SBAR, Kardex, Intake/Output and Recent Results.

## 2021-07-06 NOTE — PROGRESS NOTES
Hospitalist Progress Note    Patient: Bjorn Talley MRN: [id number]  CSN: [id number]    YOB: 1945  Age: 78 y.o. Sex: male    DOA: 6/27/2021 LOS:  LOS: 9 days          Chief Complaint:    cough    Assessment/Plan     81-year-old male past medical history of hypertension, COPD, Parkinson's and CVA who is being admitted for sepsis secondary to urinary source, urinary tract infection, COPD exacerbation and urinary retention.      Sepsis-ruled out  COPD exacerbation w/ bronchitis-continued wheezing and chest congestion  Ordered CXR-no pneumonia seen  Change abx to just levaquin  contiue steroids IV  Add mucomyst to pulmicort and duoneb as having continued mucus production and coughing spells  Also chest therapy as per RT    Urine and blood cultures are negative       Urinary tract infection-pyuria  Treated    HTN-continue current meds     Urinary retention -  resolved       May have notable anxiety component so ordered ativan low dose PRN     Has passed PT  Wean from 02     Continue nebs, steroids, abx, his bronchitis with COPD continues to be limiting factor, CXR unrevealing, he is not currently hypoxic, continue inpatient treatment, hopefully can DC home in next 24-48 hours     Disposition :  Patient Active Problem List   Diagnosis Code    Syncope R55    Hypertension I10    Chronic obstructive pulmonary disease ([facility] [address] J44.9    Parkinson's disease ([facility] [address] G20    COPD exacerbation ([facility] [address] J44.1    UTI (urinary tract infection) N39.0    Dyspnea R06.00    Sepsis ([facility] [address] A41.9    Urinary retention R33.9    Weakness generalized R53.1    Acute bronchitis J20.9       Subjective:    Sitting up in his chair by the window, finished breakfast.     Had coughing spell while talking.  Cracks jokes, which make him laugh and the laughter makes him choke and cough     Denies chest pain     Review of systems:    Constitutional: denies fevers, chills  Respiratory: +SOB, +cough  Cardiovascular: denies chest pain, palpitations  Gastrointestinal: denies nausea, vomiting, diarrhea      Vital signs/Intake and Output:  Visit Vitals  BP (!) 101/59   Pulse (!) 105   Temp 97.5 °F (36.4 °C)   Resp 20   Ht 5' 7\" (1.702 m)   Wt 86.8 kg (191 lb 4.8 oz)   SpO2 92%   BMI 29.96 kg/m²     Current Shift:  No intake/output data recorded. Last three shifts:  07/04 1901 - 07/06 0700  In: 1180 [P.O.:480; I.V.:700]  Out: 2350 [Urine:2350]    Exam:    General: Well developed, alert, NAD, OX3  CVS:Regular rate and rhythm, no M/R/G, S1/S2 heard, no thrill  Lungs:Coarse rhonchi and wheezes BL  Abdomen: Soft, Nontender, No distention, Normal Bowel sounds, No hepatomegaly  Extremities: No C/C/E, pulses palpable 2+  Neuro:grossly normal , follows commands  Psych:appropriate                Labs: Results:       Chemistry No results for input(s): GLU, NA, K, CL, CO2, BUN, CREA, CA, AGAP, BUCR, TBIL, AP, TP, ALB, GLOB, AGRAT in the last 72 hours. No lab exists for component: GPT   CBC w/Diff No results for input(s): WBC, RBC, HGB, HCT, PLT, GRANS, LYMPH, EOS, HGBEXT, HCTEXT, PLTEXT, HGBEXT, HCTEXT, PLTEXT in the last 72 hours. Cardiac Enzymes No results for input(s): CPK, CKND1, RITA in the last 72 hours. No lab exists for component: CKRMB, TROIP   Coagulation No results for input(s): PTP, INR, APTT, INREXT, INREXT in the last 72 hours. Lipid Panel No results found for: CHOL, CHOLPOCT, CHOLX, CHLST, CHOLV, [id number], HDL, HDLP, LDL, LDLC, DLDLP, [id number], VLDLC, VLDL, TGLX, TRIGL, TRIGP, TGLPOCT, CHHD, CHHDX   BNP No results for input(s): BNPP in the last 72 hours. Liver Enzymes No results for input(s): TP, ALB, TBIL, AP in the last 72 hours.     No lab exists for component: SGOT, GPT, DBIL   Thyroid Studies Lab Results   Component Value Date/Time    TSH 0.92 10/30/2017 02:16 AM        Procedures/imaging: see electronic medical records for all procedures/Xrays and details which were not copied into this note but were reviewed prior to creation of Bart Alexander MD

## 2021-07-06 NOTE — PROGRESS NOTES
Discharge Planning: Home with outpatient therapy vs home with home health    CM spoke with the patient at the bedside regarding plans for discharge. The patient states that he desires to go to inpatient SNF/rehab at [facility] and this CM informed the patient that [facility] can not accept because he is currently too high functioning based on his recent PT/OT notes. CM offered to send the patient's referral to other SNF/rehab facilities but he declines stating \"nope, just send me home\". CM has offered to arrange home health services upon discharge for the patient but the patient is unsure as to where he will be staying upon discharge since he lives in an  and is under the impression that he can not remain in [place] for much longer. CM to follow the patient's progress and be available to assist with discharge planning as needed. CMS referral placed. Care Management Interventions  PCP Verified by CM: Yes  Palliative Care Criteria Met (RRAT>21 & CHF Dx)?: No  Mode of Transport at Discharge:  Other (see comment) (family)  Physical Therapy Consult: Yes  Occupational Therapy Consult: Yes  Current Support Network: Lives Alone  Confirm Follow Up Transport: Friends  The Patient and/or Patient Representative was Provided with a Choice of Provider and Agrees with the Discharge Plan?: Yes  Name of the Patient Representative Who was Provided with a Choice of Provider and Agrees with the Discharge Plan: Jg Puentes  [place] of Choice List was Provided with Basic Dialogue that Supports the Patient's Individualized Plan of Care/Goals, Treatment Preferences and Shares the Quality Data Associated with the Providers?: Yes  Discharge Location  Discharge Placement: Home with outpatient services

## 2021-07-06 NOTE — PROGRESS NOTES
Problem: Self Care Deficits Care Plan (Adult)  Goal: *Acute Goals and Plan of Care (Insert Text)  Description: Occupational Therapy Goals  Initiated 6/28/2021 within 7 day(s). 1.  Patient will perform grooming with supervision/set-up standing at sink for 5 minutes or more. 2.  Patient will perform upper body dressing with independence. 3.  Patient will perform lower body dressing with supervision/set-up. 4.  Patient will perform toilet transfers with supervision/set-up. 5.  Patient will perform all aspects of toileting with supervision/set-up. 6.  Patient will participate in upper extremity therapeutic exercise/activities with supervision/set-up for 10 minutes. 7.  Patient will utilize energy conservation techniques during functional activities with verbal, visual, and tactile cues. Outcome: Resolved/Met    OCCUPATIONAL THERAPY TREATMENT/DISCHARGE    Patient: Trena Benitez (46 y.o. male)  Date: 7/6/2021  Diagnosis: Dyspnea [R06.00]  COPD (chronic obstructive pulmonary disease) (HCC) [J44.9]  Sepsis ([facility] [address] [A41.9]  UTI (urinary tract infection) [N39.0] Sepsis ([facility] [address]       Precautions: Fall  PLOF:     Chart, occupational therapy assessment, plan of care, and goals were reviewed. ASSESSMENT:  Pt presented seated at EOB at the beginning of session. Pt reports being \"shaky\" with transfers and ambulation during this session. Pt reports he is able to manage ADLs and functional transfers in the room with mod I and safely at this time. Pt demo safety with transfers including bed mobility, sit<>stand transfers, toilet transfers, bathroom mobility with mod I. Pt education on energy conservation/work simplification strategies for safe carryover of ADLs and transfers. Pt verbalized understanding for the same. Pt was left seated at EOB by the end of session in NAD. PLAN:  Patient will be discharged from occupational therapy at this time.   Rationale for discharge:  [x] Goals Achieved  [x] [facility] [place]ached  [] Patient not participating in therapy  [] Other:  Discharge Recommendations:  Home Health and [place]  Further Equipment Recommendations for Discharge:  N/A     SUBJECTIVE:   Patient stated  I manage myself quite well.     OBJECTIVE DATA SUMMARY:   Cognitive/Behavioral Status:  Neurologic State: Alert  Orientation Level: Oriented X4  Cognition: Appropriate for age attention/concentration, Follows commands  Safety/Judgement: Fall prevention    Functional Mobility and Transfers for ADLs:   Bed Mobility:  Scooting: Modified independent   Transfers:  Sit to Stand: Modified independent  Stand to Sit: Modified independent   Toilet Transfer : Modified independent   Bathroom Mobility: Modified independent  Balance:  Sitting: Intact  Standing: Intact; With support  ADL Intervention:  Grooming  Grooming Assistance: Modified independent  Position Performed: Standing  Washing Hands: Modified independent    Cognitive Retraining  Safety/Judgement: Fall prevention  Pain:  Pain level pre-treatment: 0/10   Pain level post-treatment: 0/10   Pain Intervention(s): Medication (see MAR); Rest, Ice, Repositioning   Response to intervention: Nurse notified, See doc flow    Activity Tolerance:    Good     Please refer to the flowsheet for vital signs taken during this treatment. After treatment:   []  Patient left in no apparent distress sitting up in chair  [x]  Patient left in no apparent distress in bed  [x]  Call bell left within reach  [x]  Nursing notified  []  Caregiver present  []  Bed alarm activated    COMMUNICATION/EDUCATION:   [x]      Role of Occupational Therapy in the acute care setting  [x]      Home safety education was provided and the patient/caregiver indicated understanding. [x]      Patient/family have participated as able and agree with findings and recommendations. []      Patient is unable to participate in plan of care at this time.       Thank you for allowing me to assist in the care of this patient.   Renella Meals, OTR/L  Time Calculation: 17 mins

## 2021-07-06 NOTE — PROGRESS NOTES
Problem: Chronic Obstructive Pulmonary Disease (COPD)  Goal: *Oxygen saturation during activity within specified parameters  Outcome: Progressing Towards Goal  Goal: *Able to remain out of bed as prescribed  Outcome: Progressing Towards Goal  Goal: *Absence of hypoxia  Outcome: Progressing Towards Goal  Goal: *Optimize nutritional status  Outcome: Progressing Towards Goal     Problem: Patient Education: Go to Patient Education Activity  Goal: Patient/Family Education  Outcome: Progressing Towards Goal     Problem: Falls - Risk of  Goal: *Absence of Falls  Description: Document Christiano Fall Risk and appropriate interventions in the flowsheet.   Outcome: Progressing Towards Goal  Note: Fall Risk Interventions:  Mobility Interventions: Communicate number of staff needed for ambulation/transfer, Patient to call before getting OOB, Utilize walker, cane, or other assistive device         Medication Interventions: Assess postural VS orthostatic hypotension, Patient to call before getting OOB, Teach patient to arise slowly    Elimination Interventions: Call light in reach, Patient to call for help with toileting needs    History of Falls Interventions: Bed/chair exit alarm         Problem: Patient Education: Go to Patient Education Activity  Goal: Patient/Family Education  Outcome: Progressing Towards Goal     Problem: Patient Education: Go to Patient Education Activity  Goal: Patient/Family Education  Outcome: Progressing Towards Goal     Problem: Patient Education: Go to Patient Education Activity  Goal: Patient/Family Education  Outcome: Progressing Towards Goal

## 2021-07-07 VITALS
SYSTOLIC BLOOD PRESSURE: 143 MMHG | WEIGHT: 191.3 LBS | OXYGEN SATURATION: 100 % | BODY MASS INDEX: 30.02 KG/M2 | HEART RATE: 93 BPM | RESPIRATION RATE: 16 BRPM | HEIGHT: 67 IN | TEMPERATURE: 98.3 F | DIASTOLIC BLOOD PRESSURE: 56 MMHG

## 2021-07-07 LAB
GLUCOSE BLD STRIP.AUTO-MCNC: 156 MG/DL (ref 70–110)
GLUCOSE BLD STRIP.AUTO-MCNC: 183 MG/DL (ref 70–110)

## 2021-07-07 PROCEDURE — 74011250637 HC RX REV CODE- 250/637: Performed by: HOSPITALIST

## 2021-07-07 PROCEDURE — 94640 AIRWAY INHALATION TREATMENT: CPT

## 2021-07-07 PROCEDURE — 82962 GLUCOSE BLOOD TEST: CPT

## 2021-07-07 PROCEDURE — 74011250637 HC RX REV CODE- 250/637: Performed by: UROLOGY

## 2021-07-07 PROCEDURE — 74011636637 HC RX REV CODE- 636/637: Performed by: HOSPITALIST

## 2021-07-07 PROCEDURE — 94669 MECHANICAL CHEST WALL OSCILL: CPT

## 2021-07-07 PROCEDURE — 74011000250 HC RX REV CODE- 250: Performed by: HOSPITALIST

## 2021-07-07 PROCEDURE — 74011250636 HC RX REV CODE- 250/636: Performed by: FAMILY MEDICINE

## 2021-07-07 PROCEDURE — 74011636637 HC RX REV CODE- 636/637: Performed by: FAMILY MEDICINE

## 2021-07-07 PROCEDURE — 74011000250 HC RX REV CODE- 250: Performed by: FAMILY MEDICINE

## 2021-07-07 PROCEDURE — 74011250636 HC RX REV CODE- 250/636: Performed by: HOSPITALIST

## 2021-07-07 RX ORDER — LEVOFLOXACIN 500 MG/1
500 TABLET, FILM COATED ORAL DAILY
Qty: 4 TABLET | Refills: 0 | Status: SHIPPED | OUTPATIENT
Start: 2021-07-07 | End: 2021-07-07 | Stop reason: SDUPTHER

## 2021-07-07 RX ORDER — PREDNISONE 20 MG/1
20 TABLET ORAL
Qty: 5 TABLET | Refills: 0 | Status: SHIPPED | OUTPATIENT
Start: 2021-07-07 | End: 2021-07-07 | Stop reason: SDUPTHER

## 2021-07-07 RX ORDER — FLUTICASONE PROPIONATE 50 MCG
2 SPRAY, SUSPENSION (ML) NASAL DAILY
Qty: 1 BOTTLE | Refills: 0 | Status: SHIPPED | OUTPATIENT
Start: 2021-07-07 | End: 2021-07-07 | Stop reason: SDUPTHER

## 2021-07-07 RX ORDER — NEBULIZER AND COMPRESSOR
1 EACH MISCELLANEOUS
Qty: 1 EACH | Refills: 0 | Status: SHIPPED | OUTPATIENT
Start: 2021-07-07

## 2021-07-07 RX ORDER — GUAIFENESIN 600 MG/1
600 TABLET, EXTENDED RELEASE ORAL EVERY 12 HOURS
Qty: 10 TABLET | Refills: 0 | Status: SHIPPED | OUTPATIENT
Start: 2021-07-07 | End: 2021-07-07 | Stop reason: SDUPTHER

## 2021-07-07 RX ORDER — IPRATROPIUM BROMIDE AND ALBUTEROL SULFATE 2.5; .5 MG/3ML; MG/3ML
3 SOLUTION RESPIRATORY (INHALATION)
Status: DISCONTINUED | OUTPATIENT
Start: 2021-07-07 | End: 2021-07-07 | Stop reason: HOSPADM

## 2021-07-07 RX ORDER — AMLODIPINE BESYLATE 10 MG/1
5 TABLET ORAL DAILY
Qty: 30 TABLET | Refills: 0 | Status: SHIPPED | OUTPATIENT
Start: 2021-07-07 | End: 2021-07-07 | Stop reason: SDUPTHER

## 2021-07-07 RX ORDER — PREDNISONE 20 MG/1
20 TABLET ORAL
Status: DISCONTINUED | OUTPATIENT
Start: 2021-07-07 | End: 2021-07-07 | Stop reason: HOSPADM

## 2021-07-07 RX ORDER — GUAIFENESIN 600 MG/1
600 TABLET, EXTENDED RELEASE ORAL EVERY 12 HOURS
Qty: 10 TABLET | Refills: 0 | Status: SHIPPED | OUTPATIENT
Start: 2021-07-07

## 2021-07-07 RX ORDER — PREDNISONE 20 MG/1
20 TABLET ORAL
Qty: 5 TABLET | Refills: 0 | Status: SHIPPED | OUTPATIENT
Start: 2021-07-07 | End: 2021-07-12

## 2021-07-07 RX ORDER — TAMSULOSIN HYDROCHLORIDE 0.4 MG/1
0.4 CAPSULE ORAL DAILY
Qty: 30 CAPSULE | Refills: 2 | Status: SHIPPED | OUTPATIENT
Start: 2021-07-07 | End: 2021-07-07 | Stop reason: SDUPTHER

## 2021-07-07 RX ORDER — FLUTICASONE PROPIONATE 50 MCG
2 SPRAY, SUSPENSION (ML) NASAL DAILY
Qty: 1 BOTTLE | Refills: 0 | Status: SHIPPED | OUTPATIENT
Start: 2021-07-07

## 2021-07-07 RX ORDER — LEVOFLOXACIN 500 MG/1
500 TABLET, FILM COATED ORAL DAILY
Qty: 4 TABLET | Refills: 0 | Status: SHIPPED | OUTPATIENT
Start: 2021-07-07

## 2021-07-07 RX ORDER — TAMSULOSIN HYDROCHLORIDE 0.4 MG/1
0.4 CAPSULE ORAL DAILY
Qty: 30 CAPSULE | Refills: 2 | Status: SHIPPED | OUTPATIENT
Start: 2021-07-07

## 2021-07-07 RX ORDER — AMLODIPINE BESYLATE 10 MG/1
5 TABLET ORAL DAILY
Qty: 30 TABLET | Refills: 0 | Status: SHIPPED | OUTPATIENT
Start: 2021-07-07

## 2021-07-07 RX ADMIN — METHYLPREDNISOLONE SODIUM SUCCINATE 20 MG: 40 INJECTION, POWDER, FOR SOLUTION INTRAMUSCULAR; INTRAVENOUS at 00:26

## 2021-07-07 RX ADMIN — INSULIN LISPRO 2 UNITS: 100 INJECTION, SOLUTION INTRAVENOUS; SUBCUTANEOUS at 13:18

## 2021-07-07 RX ADMIN — Medication 10 ML: at 06:47

## 2021-07-07 RX ADMIN — LEVOFLOXACIN 500 MG: 500 INJECTION, SOLUTION INTRAVENOUS at 13:18

## 2021-07-07 RX ADMIN — METHYLPREDNISOLONE SODIUM SUCCINATE 20 MG: 40 INJECTION, POWDER, FOR SOLUTION INTRAMUSCULAR; INTRAVENOUS at 08:59

## 2021-07-07 RX ADMIN — ACETYLCYSTEINE 400 MG: 100 SOLUTION ORAL; RESPIRATORY (INHALATION) at 07:11

## 2021-07-07 RX ADMIN — LORATADINE 10 MG: 10 TABLET ORAL at 09:00

## 2021-07-07 RX ADMIN — IPRATROPIUM BROMIDE AND ALBUTEROL SULFATE 3 ML: .5; 3 SOLUTION RESPIRATORY (INHALATION) at 11:39

## 2021-07-07 RX ADMIN — AMLODIPINE BESYLATE 10 MG: 5 TABLET ORAL at 09:00

## 2021-07-07 RX ADMIN — IPRATROPIUM BROMIDE AND ALBUTEROL SULFATE 3 ML: .5; 3 SOLUTION RESPIRATORY (INHALATION) at 07:10

## 2021-07-07 RX ADMIN — BUDESONIDE 500 MCG: 0.5 INHALANT RESPIRATORY (INHALATION) at 07:10

## 2021-07-07 RX ADMIN — PREDNISONE 20 MG: 20 TABLET ORAL at 13:18

## 2021-07-07 RX ADMIN — GUAIFENESIN 600 MG: 600 TABLET, EXTENDED RELEASE ORAL at 09:00

## 2021-07-07 RX ADMIN — IPRATROPIUM BROMIDE AND ALBUTEROL SULFATE 3 ML: .5; 3 SOLUTION RESPIRATORY (INHALATION) at 04:42

## 2021-07-07 RX ADMIN — ENOXAPARIN SODIUM 40 MG: 40 INJECTION SUBCUTANEOUS at 09:00

## 2021-07-07 RX ADMIN — TAMSULOSIN HYDROCHLORIDE 0.4 MG: 0.4 CAPSULE ORAL at 09:00

## 2021-07-07 RX ADMIN — INSULIN LISPRO 2 UNITS: 100 INJECTION, SOLUTION INTRAVENOUS; SUBCUTANEOUS at 08:00

## 2021-07-07 NOTE — PROGRESS NOTES
Discharge Planning: Home with family assist and MD follow-up    CM spoke with the patient regarding plans for discharge today. The patient states that a friend or family member will be available to pick him up upon discharge. The patient denies any additional questions or concerns at this time. CM to follow the patient's progress and be available to assist with discharge planning as needed. CMS referral placed. Care Management Interventions  PCP Verified by CM: Yes  Palliative Care Criteria Met (RRAT>21 & CHF Dx)?: No  Mode of Transport at Discharge:  Other (see comment) (family)  Physical Therapy Consult: Yes  Occupational Therapy Consult: Yes  Current Support Network: Lives Alone  Confirm Follow Up Transport: Friends  The Patient and/or Patient Representative was Provided with a Choice of Provider and Agrees with the Discharge Plan?: Yes  Name of the Patient Representative Who was Provided with a Choice of Provider and Agrees with the Discharge Plan: Sarah Funez  Freedom of Choice List was Provided with Basic Dialogue that Supports the Patient's Individualized Plan of Care/Goals, Treatment Preferences and Shares the Quality Data Associated with the Providers?: Yes  Discharge Location  Discharge Placement: Home with outpatient services

## 2021-07-07 NOTE — PROGRESS NOTES
Monitor tech reported patient went into non sustained. atopic atrial contraction heart rate increased to low 100's.   Reported to Dr. Mayra Larson es no new orders received patient currently NSR

## 2021-07-07 NOTE — DISCHARGE INSTRUCTIONS
Bronchitis: Care Instructions  Your Care Instructions     Bronchitis is inflammation of the bronchial tubes, which carry air to the lungs. The tubes swell and produce mucus, or phlegm. The mucus and inflamed bronchial tubes make you cough. You may have trouble breathing. Most cases of bronchitis are caused by viruses like those that cause colds. Antibiotics usually do not help and they may be harmful. Bronchitis usually develops rapidly and lasts about 2 to 3 weeks in otherwise healthy people. Follow-up care is a key part of your treatment and safety. Be sure to make and go to all appointments, and call your doctor if you are having problems. It's also a good idea to know your test results and keep a list of the medicines you take. How can you care for yourself at home? · Take all medicines exactly as prescribed. Call your doctor if you think you are having a problem with your medicine. · Get some extra rest.  · Take an over-the-counter pain medicine, such as acetaminophen (Tylenol), ibuprofen (Advil, Motrin), or naproxen (Aleve) to reduce fever and relieve body aches. Read and follow all instructions on the label. · Do not take two or more pain medicines at the same time unless the doctor told you to. Many pain medicines have acetaminophen, which is Tylenol. Too much acetaminophen (Tylenol) can be harmful. · Take an over-the-counter cough medicine that contains dextromethorphan to help quiet a dry, hacking cough so that you can sleep. Avoid cough medicines that have more than one active ingredient. Read and follow all instructions on the label. · Breathe moist air from a humidifier, hot shower, or sink filled with hot water. The heat and moisture will thin mucus so you can cough it out. · Do not smoke. Smoking can make bronchitis worse. If you need help quitting, talk to your doctor about stop-smoking programs and medicines. These can increase your chances of quitting for good.   When should you call for help? Call 911 anytime you think you may need emergency care. For example, call if:    · You have severe trouble breathing. Call your doctor now or seek immediate medical care if:    · You have new or worse trouble breathing.     · You cough up dark brown or bloody mucus (sputum).     · You have a new or higher fever.     · You have a new rash. Watch closely for changes in your health, and be sure to contact your doctor if:    · You cough more deeply or more often, especially if you notice more mucus or a change in the color of your mucus.     · You are not getting better as expected. Where can you learn more? Go to http://www.gray.com/  Enter H333 in the search box to learn more about \"Bronchitis: Care Instructions. \"  Current as of: October 26, 2020               Content Version: 12.8  © 2373-6547 [employer]. Care instructions adapted under license by [employer] (which disclaims liability or warranty for this information). If you have questions about a medical condition or this instruction, always ask your healthcare professional. [place] [address] any warranty or liability for your use of this information. COPD and Asthma: Care Instructions  Your Care Instructions     Some people who have chronic obstructive pulmonary disease (COPD) also have asthma. Both of these problems can damage your lungs. This makes it very important to control them. Asthma causes the airways that lead to the lungs to swell and become narrow. This makes it hard to breathe. You may wheeze or cough. If you have a bad attack, you may need emergency care. There are two parts to treating asthma. · Controlling asthma over the long term. · Treating attacks when they occur. You and your doctor can make an asthma treatment plan that will help. This plan tells you the medicines you take every day to reduce the swelling in your airways and prevent attacks. It also tells you what to do if you have an asthma attack. Follow-up care is a key part of your treatment and safety. Be sure to make and go to all appointments, and call your doctor if you are having problems. It's also a good idea to know your test results and keep a list of the medicines you take. How can you care for yourself at home? To control asthma over the long term  Medicines   Controller medicines reduce swelling in your lungs. They also prevent asthma attacks. Take your controller medicine exactly as prescribed. Talk to your doctor if you have any problems with your medicine. · Inhaled corticosteroid is a common and effective controller medicine. Using it the right way can prevent or reduce most side effects. · Take your controller medicine every day, not just when you have symptoms. This helps prevent problems before they occur. · Always bring your asthma medicine with you when you travel. · Your doctor may prescribe long-acting medicine that combines a corticosteroid with a beta2-agonist. Follow your doctor's instructions exactly about how to take a long-acting medicine. Examples include:  ? Fluticasone and salmeterol (Advair). ? Budesonide and formoterol (Symbicort). · Do not depend on your controller medicines to stop an asthma attack that has already started. They do not work fast enough to help. · Your doctor may also prescribe anticholinergic inhalers. These include ipratropium (Atrovent) and tiotropium (Spiriva). Education   · Learn what sets off an asthma attack. Avoid these triggers when you can. Common triggers include smoke, air pollution, pollen, animal dander, colds, stress, and cold air. · Do not smoke. Smoking can make COPD and asthma worse. If you need help quitting, talk to your doctor about stop-smoking programs and medicines. These can increase your chances of quitting for good. · Check yourself for symptoms to know which step to follow in your action plan.  Watch for things like being short of breath, having chest tightness, coughing, and wheezing. Also notice if symptoms wake you up at night or if you get tired quickly when you exercise. · You may want to learn how to use a peak flow meter. This measures how open your airways are. It may help you know when you will have an asthma attack. To treat attacks when they occur  Use your asthma action plan when you have an attack. Your quick-relief medicine, such as albuterol, will stop an asthma attack. It relaxes the muscles that get tight around the airways. · Take your quick-relief medicine exactly as prescribed. Talk with your doctor if you have any problems with your medicine. · Keep this medicine with you at all times. · You may need to use this medicine before you exercise. If your doctor prescribed corticosteroid pills to use during an attack, take them as directed. They may take hours to work, but they may shorten the attack and help you breathe better. When should you call for help? Call 911 anytime you think you may need emergency care. For example, call if:    · You have severe trouble breathing. Call your doctor now or seek immediate medical care if:    · You have new or worse shortness of breath.     · You are coughing more deeply or more often, especially if you notice more mucus or a change in the color of your mucus.     · You cough up blood.     · You have new or increased swelling in your legs or belly.     · You have a fever.     · You have used your quick-relief medicine but you are still short of breath. Watch closely for changes in your health, and be sure to contact your doctor if you have any problems. Where can you learn more? Go to http://www.gray.com/  Enter A350 in the search box to learn more about \"COPD and Asthma: Care Instructions. \"  Current as of: October 26, 2020               Content Version: 12.8  © 1473-8384 [employer].    Care instructions adapted under license by [employer] (which disclaims liability or warranty for this information). If you have questions about a medical condition or this instruction, always ask your healthcare professional. [address] any warranty or liability for your use of this information. Learning About COPD Triggers  What are triggers? When you have COPD (chronic obstructive pulmonary disease), certain things can make your symptoms worse. These are called triggers. They include:  · Cigarette smoke or air pollution. · Illnesses like colds, flu, or pneumonia. · Cleaning supplies or other chemicals. · Gases, particles, or fumes from wood or kerosene home heaters. Not all people have the same triggers. What may cause symptoms in one person may not be a problem for another person. How do triggers affect COPD? Triggers can make it harder for your lungs to work as they should and can lead to sudden difficulty breathing and other symptoms. When you are around a trigger, a COPD flare-up is more likely. If your symptoms are severe, you may need emergency treatment or have to go to the hospital for treatment. If you know what your triggers are and can avoid them, you can reduce how often you have flare-ups and how much COPD affects your life. It's also important to be active and to take your daily medicines as prescribed. This helps prevent flare-ups too. What can you do to avoid triggers? The first thing is to know your triggers. When you are having symptoms, note the things around you that might be causing them. Then look for patterns in what may be triggering your symptoms. When you have your list of possible triggers, work with your doctor to find ways to avoid them. Here are some ways to avoid a few common triggers. · Do not smoke or allow others to smoke around you. If you need help quitting, talk to your doctor about stop-smoking programs and medicines.  These can increase your chances of quitting for good. · If there is a lot of pollution, pollen, or dust outside, stay at home and keep your windows closed. Use an air conditioner or air filter in your home. Check your local weather report or newspaper for air quality and pollen reports. · Get a flu vaccine every year. Talk to your doctor about getting a pneumococcal shot. Wash your hands often to prevent infections. How can you manage a flare-up? Do not panic if you start to have a COPD flare-up. If you have a COPD action plan, follow the plan. In general:  · Use your quick-relief inhaler as directed by your doctor. If your symptoms do not get better after you use your medicine, have someone take you to the emergency room. Call an ambulance if needed. · Use a spacer with your metered-dose inhaler (MDI). If you have a nebulizer for inhaled medicine, use it. A spacer or nebulizer may help get more medicine to your lungs. · If your doctor has given you other inhaled medicines or steroid pills, take them as directed. · If your doctor has given you a prescription for an antibiotic, fill it if you need to. · Call your doctor if you have to use your antibiotic or steroid pills. Where can you learn more? Go to http://www.gray.com/  Enter W244 in the search box to learn more about \"Learning About COPD Triggers. \"  Current as of: October 26, 2020               Content Version: 12.8  © 9949-4014 [employer]. Care instructions adapted under license by [employer] (which disclaims liability or warranty for this information). If you have questions about a medical condition or this instruction, always ask your healthcare professional. [place] [address] any warranty or liability for your use of this information. Learning About Nebulizers  What is a nebulizer?   A nebulizer is a tool that delivers liquid medicine as a fine mist. You breathe in the medicine through a mouthpiece or face mask. This sends the medicine directly to your airways and lungs. You breathe in the medicine for a few minutes. What is it used for? A nebulizer may be used to treat respiratory problems. These include asthma and chronic obstructive pulmonary disease (COPD). If you have asthma, it can be used with daily controller medicines or with quick-relief medicine during an attack or flare-up. A nebulizer can make inhaling medicines easier. It may be very helpful if it is hard for you to breathe or use an inhaler. How do you use a nebulizer? 1. Put the medicine into the medicine container. Be sure to measure the right amount. 2. Make sure that the container is connected to the mouthpiece or face mask. 3. Turn on the air compressor. 4. Take deep, slow breaths through the mouthpiece or mask. Hold each breath for about 2 seconds. 5. Continue breathing until the medicine is gone from the container. When the medicine is gone, there will be no more mist coming out. This may take about 10 minutes. 6. Shake the container to make sure you get all the medicine. Where can you learn more? Go to http://www.gray.com/  Enter [id number] in the search box to learn more about \"Learning About Nebulizers. \"  Current as of: October 26, 2020               Content Version: 12.8  © 7496-7359 Healthwise, Incorporated. Care instructions adapted under license by [employer] (which disclaims liability or warranty for this information). If you have questions about a medical condition or this instruction, always ask your healthcare professional. [place] [address] any warranty or liability for your use of this information. Patient Education     Mr. Pastor Geo,  It was good to speak with you. I encourage you to stay smoke-free, and to keep your inhaler close by for when you have difficulty breathing.   If you use your inhaler for shortness of breath and you find that you do not feel better in 15 minutes, consider going to the emergency room. Also consider going to the emergency room if you have excess swelling in your legs combined with shortness of breath. Remember that shortness of breath may have lung and heart components. You are discharging with a few medications that you will be taking for a short time, and others that you may need for an extended time. Make sure you have an appointment to follow-up these medications, as you may need them to maintain your health. Short-term medications:  Levofloxacin - antibiotic  Prednisone - corticosteroid anti-inflammatory for lungs    Possible long-term medications:  Amlodipine - blood pressure  Guaifenesin - expectorant (helps to cough phlegm)  Fluticasone spray - antihistamine (decrease allergy problems)  Flomax - manages voiding of urine with prostate problems    Thank you for letting me be involved in your care. Please read the below literature for more information about COPD and heart failure. Ainsley Snowden, MSN, RN, [facility]    Clinical Nurse Specialist  [address] [place] [address]., [facility]  Office: [phone]   [address] [place] [address] Road: [phone]  [email or web address]            Chronic Obstructive Pulmonary Disease (COPD) Flare-Ups: Care Instructions  Your Care Instructions     Chronic obstructive pulmonary disease (COPD) is a lung disease that makes it hard to breathe. It is caused by damage to the lungs over many years, usually from smoking. COPD is often a mix of two diseases:  · Chronic bronchitis: The airways that carry air to the lungs (bronchial tubes) get inflamed and make a lot of mucus. This can narrow or block the airways. · Emphysema: In a healthy person, the tiny air sacs in the lungs are like balloons. As you breathe in and out, they get bigger and smaller to move air through your lungs.  But with emphysema, these air sacs are damaged and lose their stretch. Less air gets in and out of the lungs. Many people with COPD have attacks called flare-ups or exacerbations. This is when your usual symptoms quickly get worse and stay worse. The doctor has checked you carefully. But problems can develop later. If you notice any problems or new symptoms, get medical treatment right away. Follow-up care is a key part of your treatment and safety. Be sure to make and go to all appointments, and call your doctor if you are having problems. It's also a good idea to know your test results and keep a list of the medicines you take. How can you care for yourself at home? · Be safe with medicines. Take your medicines exactly as prescribed. Call your doctor if you think you are having a problem with your medicine. You may be taking medicines such as:  ? Bronchodilators. These help open your airways and make breathing easier. ? Corticosteroids. These reduce airway inflammation. They may be given as pills, in a vein, or in an inhaled form. You may go home with pills in addition to an inhaler that you already use. · A spacer may help you get more inhaled medicine to your lungs. Ask your doctor or pharmacist if a spacer is right for you. If it is, ask how to use it properly. · If your doctor prescribed antibiotics, take them as directed. Do not stop taking them just because you feel better. You need to take the full course of antibiotics. · If your doctor prescribed oxygen, use the flow rate your doctor has recommended. Do not change it without talking to your doctor first.  · Do not smoke. Smoking makes COPD worse. If you need help quitting, talk to your doctor about stop-smoking programs and medicines. These can increase your chances of quitting for good. When should you call for help? Call 911 anytime you think you may need emergency care. For example, call if:    · You have severe trouble breathing.    Call your doctor now or seek immediate medical care if:    · You have new or worse trouble breathing.     · Your coughing or wheezing gets worse.     · You cough up dark brown or bloody mucus (sputum).     · You have a new or higher fever. Watch closely for changes in your health, and be sure to contact your doctor if:    · You notice more mucus or a change in the color of your mucus.     · You need to use your antibiotic or steroid pills.     · You do not get better as expected. Where can you learn more? Go to http://www.gray.com/  Enter D989 in the search box to learn more about \"Chronic Obstructive Pulmonary Disease (COPD) Flare-Ups: Care Instructions. \"  Current as of: October 26, 2020               Content Version: 12.8  © 2006-2021 [employer]. Care instructions adapted under license by [employer] (which disclaims liability or warranty for this information). If you have questions about a medical condition or this instruction, always ask your healthcare professional. [place] [address] any warranty or liability for your use of this information. Patient Education   Albuterol (By breathing)   Albuterol (al-BUE-ter-ol)  Treats or prevents bronchospasm. Brand Name(s): AccuNeb, ProAir HFA, ProAir RespiClick, Proventil HFA, Ventolin HFA   There may be other brand names for this medicine. When This Medicine Should Not Be Used: This medicine is not right for everyone. Do not use it if you had an allergic reaction to albuterol or milk proteins. How to Use This Medicine:   Aerosol, Powder Under Pressure, Suspension, Solution, Powder  · Your doctor will tell you how much medicine to use. Do not use more than directed. Ask your doctor or pharmacist if you have questions about how to use your inhaler, nebulizer, or medicine. · Solution:   ¨ You will use this medicine with an inhaler device called a nebulizer.  The nebulizer turns the medicine into a fine mist that you breathe in through your mouth and to your lungs. Your caregiver will show you how to use your nebulizer. ¨ Store unopened vials of this medicine at room temperature, away from heat and direct light. Do not freeze. An open vial of medicine must be used right away. · Aerosol inhaler:   ¨ Shake the inhaler well just before each use. Avoid spraying this medicine into your eyes. ¨ Test spray in the air before using for the first time or if the inhaler has not been used for a while. ¨ If you are supposed to use more than one puff, wait 1 to 2 minutes before inhaling the second puff. Repeat these steps for the next puff, starting with shaking the inhaler. ¨ Clean the inhaler mouthpiece at least once a week with warm running water for 30 seconds. Let it air dry completely. ¨ Store the canister at room temperature, away from heat and direct light. Do not freeze. Do not keep this medicine inside a car where it could be exposed to extreme heat or cold. Do not poke holes in the canister or throw it into a fire, even if the canister is empty. · ProAir® Respiclick® inhaler:   ¨ Make sure the cap is closed. Do not open the cap unless you are going to use the medicine. ¨ Hold the inhaler upright. Open the cap fully until you hear a click. Your inhaler is now ready to use. ¨ Close the cap firmly over the mouthpiece after each use. ¨ Keep the inhaler clean and dry at all times. Do not wash or put any part of the inhaler in water. ¨ To clean the mouthpiece, wipe it gently with a dry cloth or tissue. ¨ Keep the medicine in the foil pouch until you are ready to use it. Store at room temperature, away from heat and direct light. Do not freeze. · Read and follow the patient instructions that come with this medicine. Talk to your doctor or pharmacist if you have any questions. · Missed dose: Take a dose as soon as you remember. If it is almost time for your next dose, wait until then and take a regular dose.  Do not take extra medicine to make up for a missed dose.  Drugs and Foods to Avoid:   Ask your doctor or pharmacist before using any other medicine, including over-the-counter medicines, vitamins, and herbal products. · Some foods and medicines can affect how albuterol works. Tell your doctor if you are using any of the following:  ¨ Digoxin  ¨ Beta-blocker medicine  ¨ Diuretic (water pill)  ¨ Medicine for depression or an MAO inhibitor within the past 2 weeks  Warnings While Using This Medicine:   · Tell your doctor if you are pregnant or breastfeeding, or if you have kidney disease, diabetes, heart disease, heart rhythm problems, high blood pressure, overactive thyroid, or a history of seizures. · This medicine may cause the following problems:   ¨ Paradoxical bronchospasm (increased trouble breathing right after use)  ¨ Low potassium levels in the blood  · If you use a corticosteroid medicine to control your asthma, keep using it as instructed by your doctor. · Call your doctor if your symptoms do not improve or if they get worse. · If any of your asthma medicines do not seem to be working as well as usual, call your doctor right away. Do not change your doses or stop using your medicines without asking your doctor. · Your doctor will check your progress and the effects of this medicine at regular visits. Keep all appointments. · Keep all medicine out of the reach of children. Never share your medicine with anyone.   Possible Side Effects While Using This Medicine:   Call your doctor right away if you notice any of these side effects:  · Allergic reaction: Itching or hives, swelling in your face or hands, swelling or tingling in your mouth or throat, chest tightness, trouble breathing  · Dry mouth, increased thirst, muscle cramps, nausea, vomiting  · Fast, pounding, or uneven heartbeat, chest pain  · Lightheadedness, dizziness, or fainting  · Trouble breathing, increased wheezing, cough, chest tightness  If you notice these less serious side effects, talk with your doctor:   · Cough, sore throat, runny or stuffy nose  · Headache  · Tremors, nervousness  If you notice other side effects that you think are caused by this medicine, tell your doctor. Call your doctor for medical advice about side effects. You may report side effects to FDA at 2-252-ATZ-1019  © 2017 [facility] Information is for End User's use only and may not be sold, redistributed or otherwise used for commercial purposes. The above information is an  only. It is not intended as medical advice for individual conditions or treatments. Talk to your doctor, nurse or pharmacist before following any medical regimen to see if it is safe and effective for you. Patient Education        Avoiding Triggers With Heart Failure: Care Instructions  Your Care Instructions     Triggers are anything that make your heart failure flare up. A flare-up is also called \"sudden heart failure\" or \"acute heart failure. \" When you have a flare-up, fluid builds up in your lungs, and you have problems breathing. You might need to go to the hospital. By watching for changes in your condition and avoiding triggers, you can prevent heart failure flare-ups. Follow-up care is a key part of your treatment and safety. Be sure to make and go to all appointments, and call your doctor if you are having problems. It's also a good idea to know your test results and keep a list of the medicines you take. How can you care for yourself at home? Watch for changes in your weight and condition  · Weigh yourself without clothing at the same time each day. Record your weight. Call your doctor if you have sudden weight gain, such as more than 2 to 3 pounds in a day or 5 pounds in a week. (Your doctor may suggest a different range of weight gain.) A sudden weight gain may mean that your heart failure is getting worse. · Keep a daily record of your symptoms.  Write down any changes in how you feel, such as new shortness of breath, cough, or problems eating. Also record if your ankles are more swollen than usual and if you feel more tired than usual. Note anything that you ate or did that could have triggered these changes. Limit sodium  Sodium causes your body to hold on to extra water. This may cause your heart failure symptoms to get worse. People get most of their sodium from processed foods. Fast food and restaurant meals also tend to be very high in sodium. · Your doctor may suggest that you limit sodium. Your doctor can tell you how much sodium is right for you. This includes limiting sodium in cooked and packaged foods. · Read food labels on cans and food packages. They tell you how much sodium you get in one serving. Check the serving size. If you eat more than one serving, you are getting more sodium. · Be aware that sodium can come in forms other than salt, including monosodium glutamate (MSG), sodium citrate, and sodium bicarbonate (baking soda). MSG is often added to Asian food. You can sometimes ask for food without MSG or salt. · Slowly reducing salt will help you adjust to the taste. Take the salt shaker off the table. · Flavor your food with garlic, lemon juice, onion, vinegar, herbs, and spices instead of salt. Do not use soy sauce, steak sauce, onion salt, garlic salt, mustard, or ketchup on your food, unless it is labeled \"low-sodium\" or \"low-salt. \"  · Make your own salad dressings, sauces, and ketchup without adding salt. · Use fresh or frozen ingredients, instead of canned ones, whenever you can. Choose low-sodium canned goods. · Eat less processed food and food from restaurants, including fast food. Exercise as directed  Moderate, regular exercise is very good for your heart. It improves your blood flow and helps control your weight. But too much exercise can stress your heart and cause a heart failure flare-up.   · Check with your doctor before you start an exercise program.  · Walking is an easy way to get exercise. Start out slowly. Gradually increase the length and pace of your walk. Swimming, riding a bike, and using a treadmill are also good forms of exercise. · When you exercise, watch for signs that your heart is working too hard. You are pushing yourself too hard if you cannot talk while you are exercising. If you become short of breath or dizzy or have chest pain, stop, sit down, and rest.  · Do not exercise when you do not feel well. Take medicines correctly  · Take your medicines exactly as prescribed. Call your doctor if you think you are having a problem with your medicine. · Make a list of all the medicines you take. Include those prescribed to you by other doctors and any over-the-counter medicines, vitamins, or supplements you take. Take this list with you when you go to any doctor. · Take your medicines at the same time every day. It may help you to post a list of all the medicines you take every day and what time of day you take them. · Make taking your medicine as simple as you can. Plan times to take your medicines when you are doing other things, such as eating a meal or getting ready for bed. This will make it easier to remember to take your medicines. · Get organized. Use helpful tools, such as daily or weekly pill containers. When should you call for help? Call 911 if you have symptoms of sudden heart failure such as:    · You have severe trouble breathing.     · You cough up pink, foamy mucus.     · You have a new irregular or rapid heartbeat. Call your doctor now or seek immediate medical care if:    · You have new or increased shortness of breath.     · You are dizzy or lightheaded, or you feel like you may faint.     · You have sudden weight gain, such as more than 2 to 3 pounds in a day or 5 pounds in a week.  (Your doctor may suggest a different range of weight gain.)     · You have increased swelling in your legs, ankles, or feet.     · You are suddenly so tired or weak that you cannot do your usual activities. Watch closely for changes in your health, and be sure to contact your doctor if you develop new symptoms. Where can you learn more? Go to http://www.gray.com/  Enter V089 in the search box to learn more about \"Avoiding Triggers With Heart Failure: Care Instructions. \"  Current as of: August 31, 2020               Content Version: 12.8  © 2006-2021 [employer]. Care instructions adapted under license by [employer] (which disclaims liability or warranty for this information). If you have questions about a medical condition or this instruction, always ask your healthcare professional. [address] any warranty or liability for your use of this information.

## 2021-07-07 NOTE — ADVANCED PRACTICE NURSE
[facility]  Clinical Nurse Specialist Rounding Note      NAME: Andrew Gomez  MRN: [id number]  AGE: 78 y.o., : 1945  DATE OF ADMISSION: 2021  Rounding Date and Time: 2021 12:28 PM  Attending Provider: Evelin Brunner, *  Reason for Admission: Shortness of Breath  Primary Diagnosis: Sepsis ([facility] [address]   Code Status: DNR  Allergies: No Known Allergies    Assessment/Plan:  1. Hx COPD. Crackles in bilateral bases, and productive cough. No wheezing today. States that he feels fine today. a. Inpt meds: Duo-neb, mucinex, pulmicort neb, prednisone. i. Prednisone will be continued on discharge per Dr. Edinson Jack. b. Outpt meds: Albuterol only. Reportedly prescribed Spiriva but it he states it has never been filled  c. Med education: reinforced use of albuterol for SoB, and advised to carry it with him when he travels. d. Nebulizer: new order for nebulizer is placed on discharge  e. Oxygen therapy: does not use any at home. Doing well on room air. f. Smoking cessation: quit smoking in .   g. Follow-up: He has PCP with [facility]. in [place], but no pulmonologist.   h. Rec: Reinforce with pt to use albuterol inhaler. Encourage him to follow-up with PCP to monitor his COPD and refer to pulmonologist.    2. Risk for heart failure. Pro-BNP on  was 5,846. Bilateral legs with +1 pitting edema. a. Education: educated that swelling leg may be indicative of heart disease. b. Rec: Advised patient to monitor his leg swelling, and if it worsens with SoB, then he should go to ED. 3. Transition of care of older adult. He travels between here and [facility], and means to travel often. a. Mobility: cane, and minimal assistance  b. Elimination: no deficit today, improved since admission. c. Appetite: good, eating well.  d. Skin: Intact  e. Sleep: no deficit  f.  Access to medication: He states that he does not fill his medication because Medicare does not approve inhalers other than his albuterol. g. Code status: DNR. States he has an AMD prepared at home.   h. Rec: Needs to be evaluated by his PCP regarding inhalers as above. Hx:   Past Medical History:   Diagnosis Date    Chronic obstructive pulmonary disease ([facility] [address]     Hypertension     Parkinson's disease ([facility] [address]     Stroke ([facility] [address]      Subjective:    Chief Complaint: Short of Breath  Verbatim: \"No discomfort\"    HPI: Dewey Willingham is a 78 y.o. male who presents with SoB. He came to the ED originally for difficulty breathing, and difficulty urinating. He states that he was not sure whether his infection was coming from his chest or his urinary system. He was admitted by Dr. Clifton Badillo for sepsis, UTI, and COPD exacerbation. He has been treated with antibiotics, nebulizer tx, and steroids. He is in good spirits, makes jokes, and feels well today. His hx includes COPD, Htn, Stroke. Therefore, the pt is being followed by this CNS for COPD teaching. Regarding his COPD, he states that he has had difficulty with his breathing for several years and followed with his PCP with [facility]. He owns a Proair albuterol inhaler and a nebulizer. He does not use any other medications, but he says he was prescribed a maintenance inhaler. However, he has not been able to fill it because Medicaid did not cover it. He does not use oxygen therapy at home. He believes his breathing problems originated from when he worked in [employer] for 14 years, as well as a period of time of smoking. He has quit smoking since 1982. He denies being recently admitted for any other breathing problems. He lives alone; his wife passed away in 2017. Objective:    Physical Assessment:    General: Alert and no distress. Oriented to Person, Place, Time and Situation    HEENT: PERRLA. Not hard of hearing. Uses glasses but no deficit otherwise    Respiratory: crackles in bilateral bases, no wheezing    Cardiac: /66, HR 85    GI: Bowel sounds active.      : Void status is voiding well without difficulty  Skin: Skin color, texture, turgor normal. No rashes or lesions   Extremities: 1+ pitting edema bilateral extremities. Sensation is intact.     Vitals:  Patient Vitals for the past 12 hrs:   Temp Pulse Resp BP SpO2   07/07/21 0711 -- -- -- -- 94 %   07/07/21 0632 97.4 °F (36.3 °C) 85 16 131/66 96 %   07/07/21 0442 -- -- -- -- 98 %   07/07/21 0429 97.6 °F (36.4 °C) 88 18 (!) 141/81 98 %        Pertinent labs:  Recent Results (from the past 12 hour(s))   GLUCOSE, POC    Collection Time: 07/07/21  6:14 AM   Result Value Ref Range    Glucose (POC) 156 (H) 70 - 110 mg/dL   GLUCOSE, POC    Collection Time: 07/07/21 11:15 AM   Result Value Ref Range    Glucose (POC) 183 (H) 70 - 110 mg/dL           Current Facility-Administered Medications:     albuterol-ipratropium (DUO-NEB) 2.5 MG-0.5 MG/3 ML, 3 mL, Nebulization, Q4H PRN, Anaya Trejo MD, 3 mL at 07/07/21 1139    predniSONE (DELTASONE) tablet 20 mg, 20 mg, Oral, DAILY WITH LUNCH, Anaya Trejo MD    levoFLOXacin (LEVAQUIN) 500 mg in D5W IVPB, 500 mg, IntraVENous, Q24H, Anaya Trejo MD, Last Rate: 100 mL/hr at 07/06/21 1348, 500 mg at 07/06/21 1348    LORazepam (ATIVAN) tablet 0.5 mg, 0.5 mg, Oral, Q6H PRN, Anaya Trejo MD, 0.5 mg at 07/06/21 2133    loratadine (CLARITIN) tablet 10 mg, 10 mg, Oral, DAILY, Anaya Trejo MD, 10 mg at 07/07/21 0900    polyethylene glycol (MIRALAX) packet 17 g, 17 g, Oral, DAILY, Anaya Trejo MD, 17 g at 07/06/21 0807    amLODIPine (NORVASC) tablet 10 mg, 10 mg, Oral, DAILY, Anaya Trejo MD, 10 mg at 07/07/21 0900    hydrALAZINE (APRESOLINE) 20 mg/mL injection 20 mg, 20 mg, IntraVENous, Q6H PRN, Anaya Trejo MD    guaiFENesin ER [facility]) tablet 600 mg, 600 mg, Oral, Q12H, Anaya Trejo MD, 600 mg at 07/07/21 0900    polyvinyl alcohol-povidon(PF) (REFRESH CLASSIC) 1.4-0.6 % ophthalmic solution 1 Drop, 1 Drop, Both Eyes, PRN, Marni Rodriguez MD    nitroglycerin (NITROSTAT) tablet 0.4 mg, 0.4 mg, SubLINGual, PRN, Warner Sandoval MD, 0.4 mg at 06/30/21 1801    tamsulosin (FLOMAX) capsule 0.4 mg, 0.4 mg, Oral, DAILY, Lebron Navarro MD, 0.4 mg at 07/07/21 0900    melatonin (rapid dissolve) tablet 5 mg, 5 mg, Oral, QHS PRN, Kayleigh العراقي MD, 5 mg at 07/02/21 0145    benzonatate (TESSALON) capsule 100 mg, 100 mg, Oral, TID PRN, Kayleigh العراقي MD, 100 mg at 07/04/21 1644    fluticasone propionate (FLONASE) 50 mcg/actuation nasal spray 2 Spray, 2 Spray, Both Nostrils, DAILY, Kayleigh العراقي MD, 2 Jean at 07/06/21 0900    sodium chloride (NS) flush 5-40 mL, 5-40 mL, IntraVENous, Q8H, Warner Sandoval MD, 10 mL at 07/07/21 0647    sodium chloride (NS) flush 5-40 mL, 5-40 mL, IntraVENous, PRN, Shaquille Sandoval MD    acetaminophen (TYLENOL) tablet 650 mg, 650 mg, Oral, Q6H PRN **OR** acetaminophen (TYLENOL) suppository 650 mg, 650 mg, Rectal, Q6H PRN, Shaquille Sandoval MD    polyethylene glycol (MIRALAX) packet 17 g, 17 g, Oral, DAILY PRN, Warner Sandoval MD    ondansetron (ZOFRAN ODT) tablet 4 mg, 4 mg, Oral, Q8H PRN **OR** ondansetron (ZOFRAN) injection 4 mg, 4 mg, IntraVENous, Q6H PRN, Warner Sandoval MD    enoxaparin (LOVENOX) injection 40 mg, 40 mg, SubCUTAneous, DAILY, Warner Sandoval MD, 40 mg at 07/07/21 0900    albuterol-ipratropium (DUO-NEB) 2.5 MG-0.5 MG/3 ML, 3 mL, Nebulization, Q4H PRN, Warner Sandoval MD, 3 mL at 07/04/21 1719    budesonide (PULMICORT) 500 mcg/2 ml nebulizer suspension, 500 mcg, Nebulization, BID RT, Warner Sandoval MD, 500 mcg at 07/07/21 0710    insulin lispro (HUMALOG) injection, , SubCUTAneous, AC&HS, Wraner Sandoval MD, 2 Units at 07/07/21 0800    glucose chewable tablet 16 g, 4 Tablet, Oral, PRN, Warner Sandoval MD    glucagon (GLUCAGEN) injection 1 mg, 1 mg, IntraMUSCular, PRN, Shaquille Sandoval MD    dextrose (D50W) injection syrg 12.5-25 g, 25-50 mL, IntraVENous, PRN, Mike-Mari Suarez MD Geoffery Dodge, MSN, RN, [facility]    Clinical Nurse Specialist  [address]  Office: [phone] [address]: [phone]

## 2021-07-07 NOTE — DISCHARGE SUMMARY
[address]    Name:  Leatha Taylor  MR#:   [id number]  :  1945  ACCOUNT #:  [de-identified]  ADMIT DATE:  2021  DISCHARGE DATE:  2021      DISCHARGE DIAGNOSES:  1. Acute bronchitis and acute chronic obstructive pulmonary disease exacerbation. 2.  Urinary retention with urinary tract infection. 3.  History of Parkinson's disease. 4.  Chronic obstructive pulmonary disease. 5.  Hypertension. CONSULTANT:  Dr. Marixa Nolasco, Urology. HOSPITAL SUMMARY:  The patient is 78years old. He has family here locally and he travels between [place] and [place] in his RV different times of the year. His primary care provider is at [address] [place] [address]. It was Dr. Christina Miller, who has since retired. I believe he is going to see another doctor there now. This 33-year-old male came into the emergency room with dysuria, inability to urinate, and worsening weakness. He had been seen about four days prior at the [facility] in [place] and was diagnosed there with a UTI, acute kidney injury, and urinary retention. He was discharged with antibiotics and a leg bag, but he developed worsening shortness of breath, weakness, and malaise. He felt so bad he had to come back to the emergency room where he was closest to which is here at [facility]. The patient has a do not resuscitate, do not intubate code status. He was placed on antibiotics, fluids. Urology was consulted, and he was treated for COPD. He has had an extensive course in the hospital.  The urinary retention has been treated by Urology. The Novak catheter is out. He is on Flomax and that situation is markedly improved.     Limiting him over the last number of days has been his respiratory status with COPD exacerbation but no signs for pneumonia requiring steroids, nebulizer treatments, chest therapy, and mucolytics, and he has been slowly but surely improving over the last few days with last x-ray on  showing no superimposed findings of pneumonia. He had a CT angiogram of the chest on  that showed no evidence for pulmonary embolism, and a CT scan was done on admission of his abdomen and pelvis that showed no abnormalities. He has been urinating without difficulty now. He is weaned off oxygen. His wheezing and congestion have improved. He is up and ambulatory. His vital signs have been stable, no fevers. He has been working with Physical Therapy throughout his stay. Most recent notes from Physical Therapy from  are that he has met all of his goals, and they actually discharged him on  from inpatient PT. He is up and ambulatory to the bathroom on his own and he feels ready for discharge today. The patient is a 53-year-old  gentleman who is in no acute distress. He is at times very animated and very talkative. He does not appear to have any significant issue this morning. He is in good spirits. His temperature is 97.4, pulse 85, blood pressure 131/66, his respiratory rate is 16, SaO2 is 96% on room air. Lungs have cleared bilaterally. Cardiac exam, regular rate and rhythm. No murmur. Abdomen soft, nontender. Lower extremities are without edema. He has had urine culture from , which is no growth and a blood culture from admission too that is also no growth. He is completing steroids to oral transition and antibiotics at this time. He has had Mucomyst, Pulmicort, and DuoNeb over the last few days, and he is notably improved at this point. His code status is do not resuscitate. Today, he is ready for discharge from the hospital.  He is going to have support with his family here locally. He is going to follow up with Dr. Marixa Nolasco in the office as needed and Dr. Ronn Rae on 07/15 in [place]. DISCHARGE MEDICATIONS:  Include the followin. Prednisone 20 mg daily for five days. 2.  Flomax 0.4 mg daily.   3.  Levaquin 500 mg daily for four days. 4.  Mucinex 600 mg twice daily. 5.  Flonase two sprays by both nostrils daily. 6.  Norvasc 5 mg daily. 7.  He is going to continue his Spiriva HandiHaler one cap inhalation daily. 8.  DuoNeb every four hours as needed. 9.  I have written a prescription for a nebulizer for him so that he can try that, so he can have one at home if he does not already. 10.  I have also prescribed Mucinex 600 mg twice daily for another five days and the prescriptions have been sent to the pharmacy of his choice. No new concerns at this time. He appears to be quite pleased that he is going home today. No new respiratory or other medical concerns at this point. Continue treatment as noted above and follow up closely with Dr. Zohreh Junior at [address] [place] [address]. Forty minutes on discharge time. Lavon Gonzales MD      [id number]  D:  07/07/2021 10:50  T:  07/07/2021 13:12  JOB #:  [id number]  CC:   [address] [place] [address]